# Patient Record
Sex: MALE | Race: WHITE | NOT HISPANIC OR LATINO | Employment: OTHER
[De-identification: names, ages, dates, MRNs, and addresses within clinical notes are randomized per-mention and may not be internally consistent; named-entity substitution may affect disease eponyms.]

---

## 2017-05-16 ENCOUNTER — RECORDS - HEALTHEAST (OUTPATIENT)
Dept: ADMINISTRATIVE | Facility: OTHER | Age: 52
End: 2017-05-16

## 2017-05-23 ENCOUNTER — AMBULATORY - HEALTHEAST (OUTPATIENT)
Dept: SCHEDULING | Facility: CLINIC | Age: 52
End: 2017-05-23

## 2017-05-23 DIAGNOSIS — E10.8 TYPE 1 DIABETES MELLITUS WITH COMPLICATION (H): ICD-10-CM

## 2017-07-17 ENCOUNTER — HOSPITAL ENCOUNTER (OUTPATIENT)
Dept: NUTRITION | Facility: HOSPITAL | Age: 52
Discharge: HOME | End: 2017-07-17

## 2017-07-17 DIAGNOSIS — E10.8 TYPE 1 DIABETES MELLITUS WITH COMPLICATION (H): ICD-10-CM

## 2017-11-02 ASSESSMENT — MIFFLIN-ST. JEOR: SCORE: 1686.29

## 2017-11-03 ENCOUNTER — SURGERY - HEALTHEAST (OUTPATIENT)
Dept: SURGERY | Facility: HOSPITAL | Age: 52
End: 2017-11-03

## 2017-11-03 ENCOUNTER — ANESTHESIA - HEALTHEAST (OUTPATIENT)
Dept: SURGERY | Facility: HOSPITAL | Age: 52
End: 2017-11-03

## 2017-11-06 ENCOUNTER — HOME CARE/HOSPICE - HEALTHEAST (OUTPATIENT)
Dept: HOME HEALTH SERVICES | Facility: HOME HEALTH | Age: 52
End: 2017-11-06

## 2018-12-05 ENCOUNTER — COMMUNICATION - HEALTHEAST (OUTPATIENT)
Dept: FAMILY MEDICINE | Facility: CLINIC | Age: 53
End: 2018-12-05

## 2018-12-12 ENCOUNTER — OFFICE VISIT - HEALTHEAST (OUTPATIENT)
Dept: FAMILY MEDICINE | Facility: CLINIC | Age: 53
End: 2018-12-12

## 2018-12-12 DIAGNOSIS — E10.9 TYPE 1 DIABETES MELLITUS (H): ICD-10-CM

## 2018-12-12 DIAGNOSIS — Z12.11 SCREEN FOR COLON CANCER: ICD-10-CM

## 2018-12-12 DIAGNOSIS — E78.5 HYPERLIPIDEMIA, UNSPECIFIED HYPERLIPIDEMIA TYPE: ICD-10-CM

## 2018-12-12 DIAGNOSIS — F41.1 GAD (GENERALIZED ANXIETY DISORDER): ICD-10-CM

## 2018-12-12 DIAGNOSIS — E03.9 HYPOTHYROIDISM, UNSPECIFIED TYPE: ICD-10-CM

## 2018-12-12 DIAGNOSIS — I10 ESSENTIAL HYPERTENSION, BENIGN: ICD-10-CM

## 2018-12-12 LAB
ALBUMIN SERPL-MCNC: 3.7 G/DL (ref 3.5–5)
ALP SERPL-CCNC: 141 U/L (ref 45–120)
ALT SERPL W P-5'-P-CCNC: 23 U/L (ref 0–45)
ANION GAP SERPL CALCULATED.3IONS-SCNC: 8 MMOL/L (ref 5–18)
AST SERPL W P-5'-P-CCNC: 18 U/L (ref 0–40)
BILIRUB SERPL-MCNC: 0.3 MG/DL (ref 0–1)
BUN SERPL-MCNC: 47 MG/DL (ref 8–22)
CALCIUM SERPL-MCNC: 9.3 MG/DL (ref 8.5–10.5)
CHLORIDE BLD-SCNC: 105 MMOL/L (ref 98–107)
CO2 SERPL-SCNC: 29 MMOL/L (ref 22–31)
CREAT SERPL-MCNC: 2.34 MG/DL (ref 0.7–1.3)
ERYTHROCYTE [DISTWIDTH] IN BLOOD BY AUTOMATED COUNT: 12.4 % (ref 11–14.5)
GFR SERPL CREATININE-BSD FRML MDRD: 29 ML/MIN/1.73M2
GLUCOSE BLD-MCNC: 230 MG/DL (ref 70–125)
HBA1C MFR BLD: 8.3 % (ref 3.5–6)
HCT VFR BLD AUTO: 40.8 % (ref 40–54)
HGB BLD-MCNC: 13.9 G/DL (ref 14–18)
LDLC SERPL CALC-MCNC: 64 MG/DL
MCH RBC QN AUTO: 30.1 PG (ref 27–34)
MCHC RBC AUTO-ENTMCNC: 34.1 G/DL (ref 32–36)
MCV RBC AUTO: 88 FL (ref 80–100)
PLATELET # BLD AUTO: 162 THOU/UL (ref 140–440)
PMV BLD AUTO: 9 FL (ref 7–10)
POTASSIUM BLD-SCNC: 4.7 MMOL/L (ref 3.5–5)
PROT SERPL-MCNC: 6.6 G/DL (ref 6–8)
RBC # BLD AUTO: 4.62 MILL/UL (ref 4.4–6.2)
SODIUM SERPL-SCNC: 142 MMOL/L (ref 136–145)
TSH SERPL DL<=0.005 MIU/L-ACNC: 3.42 UIU/ML (ref 0.3–5)
WBC: 5.8 THOU/UL (ref 4–11)

## 2018-12-12 ASSESSMENT — MIFFLIN-ST. JEOR: SCORE: 1661.9

## 2018-12-17 ENCOUNTER — COMMUNICATION - HEALTHEAST (OUTPATIENT)
Dept: FAMILY MEDICINE | Facility: CLINIC | Age: 53
End: 2018-12-17

## 2018-12-26 ENCOUNTER — COMMUNICATION - HEALTHEAST (OUTPATIENT)
Dept: FAMILY MEDICINE | Facility: CLINIC | Age: 53
End: 2018-12-26

## 2019-01-18 ENCOUNTER — COMMUNICATION - HEALTHEAST (OUTPATIENT)
Dept: FAMILY MEDICINE | Facility: CLINIC | Age: 54
End: 2019-01-18

## 2019-01-21 ENCOUNTER — COMMUNICATION - HEALTHEAST (OUTPATIENT)
Dept: FAMILY MEDICINE | Facility: CLINIC | Age: 54
End: 2019-01-21

## 2019-01-25 ENCOUNTER — RECORDS - HEALTHEAST (OUTPATIENT)
Dept: ADMINISTRATIVE | Facility: OTHER | Age: 54
End: 2019-01-25

## 2019-02-11 ENCOUNTER — COMMUNICATION - HEALTHEAST (OUTPATIENT)
Dept: FAMILY MEDICINE | Facility: CLINIC | Age: 54
End: 2019-02-11

## 2019-02-12 ENCOUNTER — COMMUNICATION - HEALTHEAST (OUTPATIENT)
Dept: FAMILY MEDICINE | Facility: CLINIC | Age: 54
End: 2019-02-12

## 2019-02-18 ENCOUNTER — COMMUNICATION - HEALTHEAST (OUTPATIENT)
Dept: FAMILY MEDICINE | Facility: CLINIC | Age: 54
End: 2019-02-18

## 2019-02-21 ENCOUNTER — OFFICE VISIT - HEALTHEAST (OUTPATIENT)
Dept: FAMILY MEDICINE | Facility: CLINIC | Age: 54
End: 2019-02-21

## 2019-02-21 DIAGNOSIS — E78.5 HYPERLIPIDEMIA, UNSPECIFIED HYPERLIPIDEMIA TYPE: ICD-10-CM

## 2019-02-21 DIAGNOSIS — E03.9 HYPOTHYROIDISM, UNSPECIFIED TYPE: ICD-10-CM

## 2019-02-21 DIAGNOSIS — G25.81 RESTLESS LEG SYNDROME: ICD-10-CM

## 2019-02-21 DIAGNOSIS — E10.319 TYPE 1 DIABETES MELLITUS WITH RETINOPATHY OF BOTH EYES, MACULAR EDEMA PRESENCE UNSPECIFIED, UNSPECIFIED RETINOPATHY SEVERITY (H): ICD-10-CM

## 2019-02-21 DIAGNOSIS — I10 ESSENTIAL HYPERTENSION, BENIGN: ICD-10-CM

## 2019-02-21 LAB
CREAT UR-MCNC: 70.7 MG/DL
MICROALBUMIN UR-MCNC: 2.41 MG/DL (ref 0–1.99)
MICROALBUMIN/CREAT UR: 34.1 MG/G

## 2019-03-02 ENCOUNTER — HOME CARE/HOSPICE - HEALTHEAST (OUTPATIENT)
Dept: HOME HEALTH SERVICES | Facility: HOME HEALTH | Age: 54
End: 2019-03-02

## 2019-03-04 ENCOUNTER — COMMUNICATION - HEALTHEAST (OUTPATIENT)
Dept: HOME HEALTH SERVICES | Facility: HOME HEALTH | Age: 54
End: 2019-03-04

## 2019-03-05 ENCOUNTER — HOME CARE/HOSPICE - HEALTHEAST (OUTPATIENT)
Dept: HOME HEALTH SERVICES | Facility: HOME HEALTH | Age: 54
End: 2019-03-05

## 2019-03-05 ENCOUNTER — COMMUNICATION - HEALTHEAST (OUTPATIENT)
Dept: CARE COORDINATION | Facility: CLINIC | Age: 54
End: 2019-03-05

## 2019-03-06 ENCOUNTER — OFFICE VISIT - HEALTHEAST (OUTPATIENT)
Dept: FAMILY MEDICINE | Facility: CLINIC | Age: 54
End: 2019-03-06

## 2019-03-06 DIAGNOSIS — I10 ESSENTIAL HYPERTENSION, BENIGN: ICD-10-CM

## 2019-03-06 DIAGNOSIS — E10.10 DIABETIC KETOACIDOSIS WITHOUT COMA ASSOCIATED WITH TYPE 1 DIABETES MELLITUS (H): ICD-10-CM

## 2019-03-06 DIAGNOSIS — Z09 HOSPITAL DISCHARGE FOLLOW-UP: ICD-10-CM

## 2019-03-06 DIAGNOSIS — F41.1 GAD (GENERALIZED ANXIETY DISORDER): ICD-10-CM

## 2019-03-06 DIAGNOSIS — A08.4 VIRAL GASTROENTERITIS: ICD-10-CM

## 2019-03-06 DIAGNOSIS — N18.30 CHRONIC KIDNEY DISEASE, STAGE III (MODERATE) (H): ICD-10-CM

## 2019-03-06 DIAGNOSIS — E78.5 HYPERLIPIDEMIA, UNSPECIFIED HYPERLIPIDEMIA TYPE: ICD-10-CM

## 2019-03-06 LAB
ANION GAP SERPL CALCULATED.3IONS-SCNC: 10 MMOL/L (ref 5–18)
BUN SERPL-MCNC: 30 MG/DL (ref 8–22)
CALCIUM SERPL-MCNC: 8.7 MG/DL (ref 8.5–10.5)
CHLORIDE BLD-SCNC: 106 MMOL/L (ref 98–107)
CO2 SERPL-SCNC: 22 MMOL/L (ref 22–31)
CREAT SERPL-MCNC: 1.96 MG/DL (ref 0.7–1.3)
GFR SERPL CREATININE-BSD FRML MDRD: 36 ML/MIN/1.73M2
GLUCOSE BLD-MCNC: 161 MG/DL (ref 70–125)
POTASSIUM BLD-SCNC: 5 MMOL/L (ref 3.5–5)
SODIUM SERPL-SCNC: 138 MMOL/L (ref 136–145)

## 2019-03-06 ASSESSMENT — MIFFLIN-ST. JEOR: SCORE: 1682.2

## 2019-03-07 ENCOUNTER — OFFICE VISIT - HEALTHEAST (OUTPATIENT)
Dept: PHARMACY | Facility: CLINIC | Age: 54
End: 2019-03-07

## 2019-03-07 ENCOUNTER — HOME CARE/HOSPICE - HEALTHEAST (OUTPATIENT)
Dept: HOME HEALTH SERVICES | Facility: HOME HEALTH | Age: 54
End: 2019-03-07

## 2019-03-07 ENCOUNTER — COMMUNICATION - HEALTHEAST (OUTPATIENT)
Dept: FAMILY MEDICINE | Facility: CLINIC | Age: 54
End: 2019-03-07

## 2019-03-07 DIAGNOSIS — I15.9 SECONDARY HYPERTENSION: ICD-10-CM

## 2019-03-07 DIAGNOSIS — F41.1 GAD (GENERALIZED ANXIETY DISORDER): ICD-10-CM

## 2019-03-07 DIAGNOSIS — G25.81 RESTLESS LEGS SYNDROME (RLS): ICD-10-CM

## 2019-03-07 DIAGNOSIS — G25.81 RESTLESS LEG SYNDROME: ICD-10-CM

## 2019-03-07 DIAGNOSIS — E10.10 DKA, TYPE 1, NOT AT GOAL (H): ICD-10-CM

## 2019-03-07 DIAGNOSIS — F51.01 PRIMARY INSOMNIA: ICD-10-CM

## 2019-03-08 ENCOUNTER — HOME CARE/HOSPICE - HEALTHEAST (OUTPATIENT)
Dept: HOME HEALTH SERVICES | Facility: HOME HEALTH | Age: 54
End: 2019-03-08

## 2019-03-09 ENCOUNTER — HOME CARE/HOSPICE - HEALTHEAST (OUTPATIENT)
Dept: HOME HEALTH SERVICES | Facility: HOME HEALTH | Age: 54
End: 2019-03-09

## 2019-03-12 ENCOUNTER — HOME CARE/HOSPICE - HEALTHEAST (OUTPATIENT)
Dept: HOME HEALTH SERVICES | Facility: HOME HEALTH | Age: 54
End: 2019-03-12

## 2019-03-13 ENCOUNTER — COMMUNICATION - HEALTHEAST (OUTPATIENT)
Dept: FAMILY MEDICINE | Facility: CLINIC | Age: 54
End: 2019-03-13

## 2019-03-15 ENCOUNTER — HOME CARE/HOSPICE - HEALTHEAST (OUTPATIENT)
Dept: HOME HEALTH SERVICES | Facility: HOME HEALTH | Age: 54
End: 2019-03-15

## 2019-03-28 ENCOUNTER — OFFICE VISIT - HEALTHEAST (OUTPATIENT)
Dept: FAMILY MEDICINE | Facility: CLINIC | Age: 54
End: 2019-03-28

## 2019-03-28 ENCOUNTER — OFFICE VISIT - HEALTHEAST (OUTPATIENT)
Dept: PHARMACY | Facility: CLINIC | Age: 54
End: 2019-03-28

## 2019-03-28 DIAGNOSIS — I10 ESSENTIAL HYPERTENSION, BENIGN: ICD-10-CM

## 2019-03-28 DIAGNOSIS — I15.9 SECONDARY HYPERTENSION: ICD-10-CM

## 2019-03-28 DIAGNOSIS — F41.1 GAD (GENERALIZED ANXIETY DISORDER): ICD-10-CM

## 2019-03-28 DIAGNOSIS — E78.5 HYPERLIPIDEMIA, UNSPECIFIED HYPERLIPIDEMIA TYPE: ICD-10-CM

## 2019-03-28 DIAGNOSIS — E10.10 DKA, TYPE 1, NOT AT GOAL (H): ICD-10-CM

## 2019-03-28 DIAGNOSIS — N18.30 CHRONIC KIDNEY DISEASE, STAGE III (MODERATE) (H): ICD-10-CM

## 2019-03-28 DIAGNOSIS — E10.9 TYPE 1 DIABETES MELLITUS (H): ICD-10-CM

## 2019-03-28 DIAGNOSIS — G62.9 NEUROPATHY: ICD-10-CM

## 2019-03-28 DIAGNOSIS — R13.10 DYSPHAGIA, UNSPECIFIED TYPE: ICD-10-CM

## 2019-03-28 DIAGNOSIS — K92.2 GASTROINTESTINAL HEMORRHAGE, UNSPECIFIED GASTROINTESTINAL HEMORRHAGE TYPE: ICD-10-CM

## 2019-03-28 DIAGNOSIS — G25.81 RESTLESS LEGS SYNDROME (RLS): ICD-10-CM

## 2019-03-28 LAB
ANION GAP SERPL CALCULATED.3IONS-SCNC: 8 MMOL/L (ref 5–18)
BUN SERPL-MCNC: 22 MG/DL (ref 8–22)
CALCIUM SERPL-MCNC: 9.1 MG/DL (ref 8.5–10.5)
CHLORIDE BLD-SCNC: 109 MMOL/L (ref 98–107)
CHOLEST SERPL-MCNC: 111 MG/DL
CO2 SERPL-SCNC: 28 MMOL/L (ref 22–31)
CREAT SERPL-MCNC: 1.97 MG/DL (ref 0.7–1.3)
FASTING STATUS PATIENT QL REPORTED: NO
GFR SERPL CREATININE-BSD FRML MDRD: 35 ML/MIN/1.73M2
GLUCOSE BLD-MCNC: 154 MG/DL (ref 70–125)
HBA1C MFR BLD: 7.4 % (ref 3.5–6)
HDLC SERPL-MCNC: 41 MG/DL
LDLC SERPL CALC-MCNC: 53 MG/DL
MAGNESIUM SERPL-MCNC: 1.9 MG/DL (ref 1.8–2.6)
POTASSIUM BLD-SCNC: 5.3 MMOL/L (ref 3.5–5)
SODIUM SERPL-SCNC: 145 MMOL/L (ref 136–145)

## 2019-03-29 ENCOUNTER — COMMUNICATION - HEALTHEAST (OUTPATIENT)
Dept: PHARMACY | Facility: CLINIC | Age: 54
End: 2019-03-29

## 2019-04-01 ENCOUNTER — OFFICE VISIT - HEALTHEAST (OUTPATIENT)
Dept: EDUCATION SERVICES | Facility: CLINIC | Age: 54
End: 2019-04-01

## 2019-04-11 ENCOUNTER — COMMUNICATION - HEALTHEAST (OUTPATIENT)
Dept: FAMILY MEDICINE | Facility: CLINIC | Age: 54
End: 2019-04-11

## 2019-04-11 DIAGNOSIS — N52.9 ERECTILE DYSFUNCTION, UNSPECIFIED ERECTILE DYSFUNCTION TYPE: ICD-10-CM

## 2019-04-12 ENCOUNTER — RECORDS - HEALTHEAST (OUTPATIENT)
Dept: ADMINISTRATIVE | Facility: OTHER | Age: 54
End: 2019-04-12

## 2019-04-16 ENCOUNTER — COMMUNICATION - HEALTHEAST (OUTPATIENT)
Dept: FAMILY MEDICINE | Facility: CLINIC | Age: 54
End: 2019-04-16

## 2019-04-16 DIAGNOSIS — N52.9 ERECTILE DYSFUNCTION, UNSPECIFIED ERECTILE DYSFUNCTION TYPE: ICD-10-CM

## 2019-04-16 DIAGNOSIS — K92.2 GASTROINTESTINAL HEMORRHAGE, UNSPECIFIED GASTROINTESTINAL HEMORRHAGE TYPE: ICD-10-CM

## 2019-04-22 ENCOUNTER — COMMUNICATION - HEALTHEAST (OUTPATIENT)
Dept: FAMILY MEDICINE | Facility: CLINIC | Age: 54
End: 2019-04-22

## 2019-06-14 ENCOUNTER — COMMUNICATION - HEALTHEAST (OUTPATIENT)
Dept: FAMILY MEDICINE | Facility: CLINIC | Age: 54
End: 2019-06-14

## 2019-06-14 DIAGNOSIS — N52.9 ERECTILE DYSFUNCTION, UNSPECIFIED ERECTILE DYSFUNCTION TYPE: ICD-10-CM

## 2019-06-21 ENCOUNTER — COMMUNICATION - HEALTHEAST (OUTPATIENT)
Dept: FAMILY MEDICINE | Facility: CLINIC | Age: 54
End: 2019-06-21

## 2019-06-21 DIAGNOSIS — E10.319 TYPE 1 DIABETES MELLITUS WITH RETINOPATHY OF BOTH EYES, MACULAR EDEMA PRESENCE UNSPECIFIED, UNSPECIFIED RETINOPATHY SEVERITY (H): ICD-10-CM

## 2019-06-24 ENCOUNTER — COMMUNICATION - HEALTHEAST (OUTPATIENT)
Dept: FAMILY MEDICINE | Facility: CLINIC | Age: 54
End: 2019-06-24

## 2019-06-24 DIAGNOSIS — G25.81 RESTLESS LEG SYNDROME: ICD-10-CM

## 2019-06-24 DIAGNOSIS — R11.0 NAUSEA: ICD-10-CM

## 2019-06-25 ENCOUNTER — COMMUNICATION - HEALTHEAST (OUTPATIENT)
Dept: FAMILY MEDICINE | Facility: CLINIC | Age: 54
End: 2019-06-25

## 2019-06-25 DIAGNOSIS — E10.319 TYPE 1 DIABETES MELLITUS WITH RETINOPATHY OF BOTH EYES, MACULAR EDEMA PRESENCE UNSPECIFIED, UNSPECIFIED RETINOPATHY SEVERITY (H): ICD-10-CM

## 2019-07-17 ENCOUNTER — OFFICE VISIT - HEALTHEAST (OUTPATIENT)
Dept: FAMILY MEDICINE | Facility: CLINIC | Age: 54
End: 2019-07-17

## 2019-07-17 DIAGNOSIS — E78.5 HYPERLIPIDEMIA, UNSPECIFIED HYPERLIPIDEMIA TYPE: ICD-10-CM

## 2019-07-17 DIAGNOSIS — N18.30 CHRONIC KIDNEY DISEASE, STAGE III (MODERATE) (H): ICD-10-CM

## 2019-07-17 DIAGNOSIS — E10.9 TYPE 1 DIABETES MELLITUS (H): ICD-10-CM

## 2019-07-17 DIAGNOSIS — I10 ESSENTIAL HYPERTENSION, BENIGN: ICD-10-CM

## 2019-07-17 LAB
ANION GAP SERPL CALCULATED.3IONS-SCNC: 8 MMOL/L (ref 5–18)
BUN SERPL-MCNC: 32 MG/DL (ref 8–22)
CALCIUM SERPL-MCNC: 9.4 MG/DL (ref 8.5–10.5)
CHLORIDE BLD-SCNC: 108 MMOL/L (ref 98–107)
CO2 SERPL-SCNC: 26 MMOL/L (ref 22–31)
CREAT SERPL-MCNC: 2.26 MG/DL (ref 0.7–1.3)
GFR SERPL CREATININE-BSD FRML MDRD: 30 ML/MIN/1.73M2
GLUCOSE BLD-MCNC: 152 MG/DL (ref 70–125)
HBA1C MFR BLD: 7.5 % (ref 3.5–6)
POTASSIUM BLD-SCNC: 5.1 MMOL/L (ref 3.5–5)
SODIUM SERPL-SCNC: 142 MMOL/L (ref 136–145)

## 2019-07-24 ENCOUNTER — COMMUNICATION - HEALTHEAST (OUTPATIENT)
Dept: FAMILY MEDICINE | Facility: CLINIC | Age: 54
End: 2019-07-24

## 2019-07-24 DIAGNOSIS — G25.81 RESTLESS LEG SYNDROME: ICD-10-CM

## 2019-07-29 ENCOUNTER — COMMUNICATION - HEALTHEAST (OUTPATIENT)
Dept: FAMILY MEDICINE | Facility: CLINIC | Age: 54
End: 2019-07-29

## 2019-07-30 ENCOUNTER — COMMUNICATION - HEALTHEAST (OUTPATIENT)
Dept: SCHEDULING | Facility: CLINIC | Age: 54
End: 2019-07-30

## 2019-08-05 ENCOUNTER — COMMUNICATION - HEALTHEAST (OUTPATIENT)
Dept: FAMILY MEDICINE | Facility: CLINIC | Age: 54
End: 2019-08-05

## 2019-08-05 DIAGNOSIS — E10.10 DKA, TYPE 1, NOT AT GOAL (H): ICD-10-CM

## 2019-08-06 ENCOUNTER — COMMUNICATION - HEALTHEAST (OUTPATIENT)
Dept: FAMILY MEDICINE | Facility: CLINIC | Age: 54
End: 2019-08-06

## 2019-08-06 DIAGNOSIS — E10.319 TYPE 1 DIABETES MELLITUS WITH RETINOPATHY OF BOTH EYES, MACULAR EDEMA PRESENCE UNSPECIFIED, UNSPECIFIED RETINOPATHY SEVERITY (H): ICD-10-CM

## 2019-08-08 ENCOUNTER — COMMUNICATION - HEALTHEAST (OUTPATIENT)
Dept: FAMILY MEDICINE | Facility: CLINIC | Age: 54
End: 2019-08-08

## 2019-08-08 DIAGNOSIS — E10.319 TYPE 1 DIABETES MELLITUS WITH RETINOPATHY OF BOTH EYES, MACULAR EDEMA PRESENCE UNSPECIFIED, UNSPECIFIED RETINOPATHY SEVERITY (H): ICD-10-CM

## 2019-08-16 ENCOUNTER — COMMUNICATION - HEALTHEAST (OUTPATIENT)
Dept: FAMILY MEDICINE | Facility: CLINIC | Age: 54
End: 2019-08-16

## 2019-08-16 DIAGNOSIS — E10.319 TYPE 1 DIABETES MELLITUS WITH RETINOPATHY OF BOTH EYES, MACULAR EDEMA PRESENCE UNSPECIFIED, UNSPECIFIED RETINOPATHY SEVERITY (H): ICD-10-CM

## 2019-08-20 ENCOUNTER — AMBULATORY - HEALTHEAST (OUTPATIENT)
Dept: EDUCATION SERVICES | Facility: CLINIC | Age: 54
End: 2019-08-20

## 2019-08-23 ENCOUNTER — COMMUNICATION - HEALTHEAST (OUTPATIENT)
Dept: FAMILY MEDICINE | Facility: CLINIC | Age: 54
End: 2019-08-23

## 2019-09-03 ENCOUNTER — AMBULATORY - HEALTHEAST (OUTPATIENT)
Dept: ENDOCRINOLOGY | Facility: CLINIC | Age: 54
End: 2019-09-03

## 2019-09-03 ENCOUNTER — COMMUNICATION - HEALTHEAST (OUTPATIENT)
Dept: FAMILY MEDICINE | Facility: CLINIC | Age: 54
End: 2019-09-03

## 2019-09-03 ENCOUNTER — OFFICE VISIT - HEALTHEAST (OUTPATIENT)
Dept: ENDOCRINOLOGY | Facility: CLINIC | Age: 54
End: 2019-09-03

## 2019-09-03 DIAGNOSIS — Z79.4 TYPE 2 DIABETES MELLITUS WITH FOOT ULCER, WITH LONG-TERM CURRENT USE OF INSULIN (H): ICD-10-CM

## 2019-09-03 DIAGNOSIS — E10.319 TYPE 1 DIABETES MELLITUS WITH RETINOPATHY OF BOTH EYES, MACULAR EDEMA PRESENCE UNSPECIFIED, UNSPECIFIED RETINOPATHY SEVERITY (H): ICD-10-CM

## 2019-09-03 DIAGNOSIS — E11.621 TYPE 2 DIABETES MELLITUS WITH FOOT ULCER, WITH LONG-TERM CURRENT USE OF INSULIN (H): ICD-10-CM

## 2019-09-03 DIAGNOSIS — E10.8 TYPE 1 DIABETES MELLITUS WITH COMPLICATION (H): ICD-10-CM

## 2019-09-03 DIAGNOSIS — E10.3599 TYPE 1 DIABETES MELLITUS WITH PROLIFERATIVE RETINOPATHY, MACULAR EDEMA PRESENCE UNSPECIFIED, UNSPECIFIED LATERALITY, UNSPECIFIED PROLIFERATIVE RETINOPATHY TYPE (H): ICD-10-CM

## 2019-09-03 DIAGNOSIS — L97.509 TYPE 2 DIABETES MELLITUS WITH FOOT ULCER, WITH LONG-TERM CURRENT USE OF INSULIN (H): ICD-10-CM

## 2019-09-03 ASSESSMENT — MIFFLIN-ST. JEOR: SCORE: 1723.03

## 2019-09-04 ENCOUNTER — COMMUNICATION - HEALTHEAST (OUTPATIENT)
Dept: ADMINISTRATIVE | Facility: CLINIC | Age: 54
End: 2019-09-04

## 2019-09-04 ENCOUNTER — COMMUNICATION - HEALTHEAST (OUTPATIENT)
Dept: FAMILY MEDICINE | Facility: CLINIC | Age: 54
End: 2019-09-04

## 2019-09-04 DIAGNOSIS — E10.8 TYPE 1 DIABETES MELLITUS WITH COMPLICATION (H): ICD-10-CM

## 2019-09-05 ENCOUNTER — COMMUNICATION - HEALTHEAST (OUTPATIENT)
Dept: FAMILY MEDICINE | Facility: CLINIC | Age: 54
End: 2019-09-05

## 2019-09-06 ENCOUNTER — COMMUNICATION - HEALTHEAST (OUTPATIENT)
Dept: FAMILY MEDICINE | Facility: CLINIC | Age: 54
End: 2019-09-06

## 2019-09-06 DIAGNOSIS — E10.9 TYPE 1 DIABETES MELLITUS (H): ICD-10-CM

## 2019-09-16 ENCOUNTER — COMMUNICATION - HEALTHEAST (OUTPATIENT)
Dept: FAMILY MEDICINE | Facility: CLINIC | Age: 54
End: 2019-09-16

## 2019-09-16 DIAGNOSIS — I15.9 SECONDARY HYPERTENSION: ICD-10-CM

## 2019-09-17 ENCOUNTER — AMBULATORY - HEALTHEAST (OUTPATIENT)
Dept: EDUCATION SERVICES | Facility: CLINIC | Age: 54
End: 2019-09-17

## 2019-09-17 DIAGNOSIS — E10.319 TYPE 1 DIABETES MELLITUS WITH RETINOPATHY OF BOTH EYES, MACULAR EDEMA PRESENCE UNSPECIFIED, UNSPECIFIED RETINOPATHY SEVERITY (H): ICD-10-CM

## 2019-09-18 ENCOUNTER — COMMUNICATION - HEALTHEAST (OUTPATIENT)
Dept: ADMINISTRATIVE | Facility: CLINIC | Age: 54
End: 2019-09-18

## 2019-09-19 ENCOUNTER — COMMUNICATION - HEALTHEAST (OUTPATIENT)
Dept: FAMILY MEDICINE | Facility: CLINIC | Age: 54
End: 2019-09-19

## 2019-09-19 DIAGNOSIS — I15.9 SECONDARY HYPERTENSION: ICD-10-CM

## 2019-09-26 ENCOUNTER — COMMUNICATION - HEALTHEAST (OUTPATIENT)
Dept: FAMILY MEDICINE | Facility: CLINIC | Age: 54
End: 2019-09-26

## 2019-09-26 DIAGNOSIS — N52.9 ERECTILE DYSFUNCTION, UNSPECIFIED ERECTILE DYSFUNCTION TYPE: ICD-10-CM

## 2019-09-26 RX ORDER — SILDENAFIL 100 MG/1
100 TABLET, FILM COATED ORAL PRN
Qty: 30 TABLET | Refills: 2 | Status: SHIPPED | OUTPATIENT
Start: 2019-09-26

## 2019-10-14 ENCOUNTER — COMMUNICATION - HEALTHEAST (OUTPATIENT)
Dept: FAMILY MEDICINE | Facility: CLINIC | Age: 54
End: 2019-10-14

## 2019-10-14 ENCOUNTER — COMMUNICATION - HEALTHEAST (OUTPATIENT)
Dept: SCHEDULING | Facility: CLINIC | Age: 54
End: 2019-10-14

## 2019-10-14 DIAGNOSIS — E10.319 TYPE 1 DIABETES MELLITUS WITH RETINOPATHY OF BOTH EYES, MACULAR EDEMA PRESENCE UNSPECIFIED, UNSPECIFIED RETINOPATHY SEVERITY (H): ICD-10-CM

## 2019-10-18 ENCOUNTER — COMMUNICATION - HEALTHEAST (OUTPATIENT)
Dept: FAMILY MEDICINE | Facility: CLINIC | Age: 54
End: 2019-10-18

## 2019-10-18 DIAGNOSIS — E78.5 HYPERLIPIDEMIA, UNSPECIFIED HYPERLIPIDEMIA TYPE: ICD-10-CM

## 2019-11-05 ENCOUNTER — COMMUNICATION - HEALTHEAST (OUTPATIENT)
Dept: ENDOCRINOLOGY | Facility: CLINIC | Age: 54
End: 2019-11-05

## 2019-11-05 ENCOUNTER — COMMUNICATION - HEALTHEAST (OUTPATIENT)
Dept: FAMILY MEDICINE | Facility: CLINIC | Age: 54
End: 2019-11-05

## 2019-11-05 DIAGNOSIS — E10.319 TYPE 1 DIABETES MELLITUS WITH RETINOPATHY OF BOTH EYES, MACULAR EDEMA PRESENCE UNSPECIFIED, UNSPECIFIED RETINOPATHY SEVERITY (H): ICD-10-CM

## 2019-11-06 ENCOUNTER — COMMUNICATION - HEALTHEAST (OUTPATIENT)
Dept: FAMILY MEDICINE | Facility: CLINIC | Age: 54
End: 2019-11-06

## 2019-11-12 ENCOUNTER — COMMUNICATION - HEALTHEAST (OUTPATIENT)
Dept: FAMILY MEDICINE | Facility: CLINIC | Age: 54
End: 2019-11-12

## 2019-11-12 ENCOUNTER — AMBULATORY - HEALTHEAST (OUTPATIENT)
Dept: EDUCATION SERVICES | Facility: CLINIC | Age: 54
End: 2019-11-12

## 2019-11-12 ENCOUNTER — AMBULATORY - HEALTHEAST (OUTPATIENT)
Dept: PHARMACY | Facility: CLINIC | Age: 54
End: 2019-11-12

## 2019-11-12 DIAGNOSIS — E10.319 TYPE 1 DIABETES MELLITUS WITH RETINOPATHY OF BOTH EYES, MACULAR EDEMA PRESENCE UNSPECIFIED, UNSPECIFIED RETINOPATHY SEVERITY (H): ICD-10-CM

## 2019-11-12 DIAGNOSIS — K92.2 GASTROINTESTINAL HEMORRHAGE, UNSPECIFIED GASTROINTESTINAL HEMORRHAGE TYPE: ICD-10-CM

## 2019-11-14 ENCOUNTER — COMMUNICATION - HEALTHEAST (OUTPATIENT)
Dept: FAMILY MEDICINE | Facility: CLINIC | Age: 54
End: 2019-11-14

## 2019-11-19 ENCOUNTER — OFFICE VISIT - HEALTHEAST (OUTPATIENT)
Dept: FAMILY MEDICINE | Facility: CLINIC | Age: 54
End: 2019-11-19

## 2019-11-19 DIAGNOSIS — F41.1 GAD (GENERALIZED ANXIETY DISORDER): ICD-10-CM

## 2019-11-19 DIAGNOSIS — E03.9 HYPOTHYROIDISM, UNSPECIFIED TYPE: ICD-10-CM

## 2019-11-19 DIAGNOSIS — Z00.01 ENCOUNTER FOR GENERAL ADULT MEDICAL EXAMINATION WITH ABNORMAL FINDINGS: ICD-10-CM

## 2019-11-19 DIAGNOSIS — G25.81 RESTLESS LEG SYNDROME: ICD-10-CM

## 2019-11-19 DIAGNOSIS — E10.9 TYPE 1 DIABETES MELLITUS (H): ICD-10-CM

## 2019-11-19 DIAGNOSIS — Z12.5 ENCOUNTER FOR SCREENING FOR MALIGNANT NEOPLASM OF PROSTATE: ICD-10-CM

## 2019-11-19 DIAGNOSIS — E16.2 HYPOGLYCEMIA: ICD-10-CM

## 2019-11-19 DIAGNOSIS — E78.5 HYPERLIPIDEMIA, UNSPECIFIED HYPERLIPIDEMIA TYPE: ICD-10-CM

## 2019-11-19 DIAGNOSIS — I10 ESSENTIAL HYPERTENSION, BENIGN: ICD-10-CM

## 2019-11-19 DIAGNOSIS — E55.9 VITAMIN D INSUFFICIENCY: ICD-10-CM

## 2019-11-19 DIAGNOSIS — N18.30 CHRONIC KIDNEY DISEASE, STAGE III (MODERATE) (H): ICD-10-CM

## 2019-11-19 LAB
25(OH)D3 SERPL-MCNC: 16.9 NG/ML (ref 30–80)
ALBUMIN SERPL-MCNC: 3.5 G/DL (ref 3.5–5)
ALP SERPL-CCNC: 116 U/L (ref 45–120)
ALT SERPL W P-5'-P-CCNC: 13 U/L (ref 0–45)
ANION GAP SERPL CALCULATED.3IONS-SCNC: 6 MMOL/L (ref 5–18)
AST SERPL W P-5'-P-CCNC: 13 U/L (ref 0–40)
BILIRUB SERPL-MCNC: 0.3 MG/DL (ref 0–1)
BUN SERPL-MCNC: 32 MG/DL (ref 8–22)
CALCIUM SERPL-MCNC: 9 MG/DL (ref 8.5–10.5)
CHLORIDE BLD-SCNC: 109 MMOL/L (ref 98–107)
CHOLEST SERPL-MCNC: 124 MG/DL
CO2 SERPL-SCNC: 27 MMOL/L (ref 22–31)
CREAT SERPL-MCNC: 2.12 MG/DL (ref 0.7–1.3)
ERYTHROCYTE [DISTWIDTH] IN BLOOD BY AUTOMATED COUNT: 12.3 % (ref 11–14.5)
FASTING STATUS PATIENT QL REPORTED: NO
GFR SERPL CREATININE-BSD FRML MDRD: 33 ML/MIN/1.73M2
GLUCOSE BLD-MCNC: 203 MG/DL (ref 70–125)
HBA1C MFR BLD: 6.7 % (ref 3.5–6)
HCT VFR BLD AUTO: 38.5 % (ref 40–54)
HCV AB SERPL QL IA: NEGATIVE
HDLC SERPL-MCNC: 40 MG/DL
HGB BLD-MCNC: 13.3 G/DL (ref 14–18)
LDLC SERPL CALC-MCNC: 54 MG/DL
MCH RBC QN AUTO: 31 PG (ref 27–34)
MCHC RBC AUTO-ENTMCNC: 34.6 G/DL (ref 32–36)
MCV RBC AUTO: 90 FL (ref 80–100)
PLATELET # BLD AUTO: 139 THOU/UL (ref 140–440)
PMV BLD AUTO: 9.6 FL (ref 7–10)
POTASSIUM BLD-SCNC: 5.5 MMOL/L (ref 3.5–5)
PROT SERPL-MCNC: 6.4 G/DL (ref 6–8)
PSA SERPL-MCNC: 0.6 NG/ML (ref 0–3.5)
RBC # BLD AUTO: 4.29 MILL/UL (ref 4.4–6.2)
SODIUM SERPL-SCNC: 142 MMOL/L (ref 136–145)
TRIGL SERPL-MCNC: 148 MG/DL
TSH SERPL DL<=0.005 MIU/L-ACNC: 5.72 UIU/ML (ref 0.3–5)
WBC: 6.4 THOU/UL (ref 4–11)

## 2019-11-19 ASSESSMENT — ANXIETY QUESTIONNAIRES
4. TROUBLE RELAXING: MORE THAN HALF THE DAYS
2. NOT BEING ABLE TO STOP OR CONTROL WORRYING: SEVERAL DAYS
5. BEING SO RESTLESS THAT IT IS HARD TO SIT STILL: SEVERAL DAYS
3. WORRYING TOO MUCH ABOUT DIFFERENT THINGS: MORE THAN HALF THE DAYS
7. FEELING AFRAID AS IF SOMETHING AWFUL MIGHT HAPPEN: MORE THAN HALF THE DAYS
1. FEELING NERVOUS, ANXIOUS, OR ON EDGE: MORE THAN HALF THE DAYS
6. BECOMING EASILY ANNOYED OR IRRITABLE: SEVERAL DAYS
GAD7 TOTAL SCORE: 11
IF YOU CHECKED OFF ANY PROBLEMS ON THIS QUESTIONNAIRE, HOW DIFFICULT HAVE THESE PROBLEMS MADE IT FOR YOU TO DO YOUR WORK, TAKE CARE OF THINGS AT HOME, OR GET ALONG WITH OTHER PEOPLE: SOMEWHAT DIFFICULT

## 2019-11-19 ASSESSMENT — MIFFLIN-ST. JEOR: SCORE: 1761.69

## 2019-11-19 ASSESSMENT — PATIENT HEALTH QUESTIONNAIRE - PHQ9: SUM OF ALL RESPONSES TO PHQ QUESTIONS 1-9: 13

## 2019-12-26 ENCOUNTER — OFFICE VISIT - HEALTHEAST (OUTPATIENT)
Dept: FAMILY MEDICINE | Facility: CLINIC | Age: 54
End: 2019-12-26

## 2019-12-26 DIAGNOSIS — E78.5 HYPERLIPIDEMIA, UNSPECIFIED HYPERLIPIDEMIA TYPE: ICD-10-CM

## 2019-12-26 DIAGNOSIS — N18.30 CHRONIC KIDNEY DISEASE, STAGE III (MODERATE) (H): ICD-10-CM

## 2019-12-26 DIAGNOSIS — D64.9 NORMOCHROMIC NORMOCYTIC ANEMIA: ICD-10-CM

## 2019-12-26 DIAGNOSIS — E87.5 HYPERKALEMIA: ICD-10-CM

## 2019-12-26 DIAGNOSIS — I10 ESSENTIAL HYPERTENSION, BENIGN: ICD-10-CM

## 2019-12-26 DIAGNOSIS — E03.9 HYPOTHYROIDISM, UNSPECIFIED TYPE: ICD-10-CM

## 2019-12-26 DIAGNOSIS — E10.319 TYPE 1 DIABETES MELLITUS WITH RETINOPATHY OF BOTH EYES, MACULAR EDEMA PRESENCE UNSPECIFIED, UNSPECIFIED RETINOPATHY SEVERITY (H): ICD-10-CM

## 2019-12-26 LAB
ANION GAP SERPL CALCULATED.3IONS-SCNC: 8 MMOL/L (ref 5–18)
BUN SERPL-MCNC: 35 MG/DL (ref 8–22)
CALCIUM SERPL-MCNC: 8.8 MG/DL (ref 8.5–10.5)
CHLORIDE BLD-SCNC: 109 MMOL/L (ref 98–107)
CO2 SERPL-SCNC: 26 MMOL/L (ref 22–31)
CREAT SERPL-MCNC: 2.13 MG/DL (ref 0.7–1.3)
ERYTHROCYTE [DISTWIDTH] IN BLOOD BY AUTOMATED COUNT: 13.1 % (ref 11–14.5)
GFR SERPL CREATININE-BSD FRML MDRD: 32 ML/MIN/1.73M2
GLUCOSE BLD-MCNC: 165 MG/DL (ref 70–125)
HCT VFR BLD AUTO: 39.7 % (ref 40–54)
HGB BLD-MCNC: 13.8 G/DL (ref 14–18)
MCH RBC QN AUTO: 30.8 PG (ref 27–34)
MCHC RBC AUTO-ENTMCNC: 34.9 G/DL (ref 32–36)
MCV RBC AUTO: 88 FL (ref 80–100)
PLATELET # BLD AUTO: 177 THOU/UL (ref 140–440)
PMV BLD AUTO: 7.9 FL (ref 7–10)
POTASSIUM BLD-SCNC: 5.2 MMOL/L (ref 3.5–5)
RBC # BLD AUTO: 4.49 MILL/UL (ref 4.4–6.2)
SODIUM SERPL-SCNC: 143 MMOL/L (ref 136–145)
TSH SERPL DL<=0.005 MIU/L-ACNC: 2.79 UIU/ML (ref 0.3–5)
WBC: 7.8 THOU/UL (ref 4–11)

## 2020-01-29 ENCOUNTER — COMMUNICATION - HEALTHEAST (OUTPATIENT)
Dept: FAMILY MEDICINE | Facility: CLINIC | Age: 55
End: 2020-01-29

## 2020-01-29 DIAGNOSIS — E10.9 TYPE 1 DIABETES MELLITUS (H): ICD-10-CM

## 2020-02-10 ENCOUNTER — COMMUNICATION - HEALTHEAST (OUTPATIENT)
Dept: FAMILY MEDICINE | Facility: CLINIC | Age: 55
End: 2020-02-10

## 2020-02-10 DIAGNOSIS — I10 ESSENTIAL HYPERTENSION, BENIGN: ICD-10-CM

## 2020-02-19 ENCOUNTER — COMMUNICATION - HEALTHEAST (OUTPATIENT)
Dept: SCHEDULING | Facility: CLINIC | Age: 55
End: 2020-02-19

## 2020-02-28 ENCOUNTER — OFFICE VISIT - HEALTHEAST (OUTPATIENT)
Dept: FAMILY MEDICINE | Facility: CLINIC | Age: 55
End: 2020-02-28

## 2020-02-28 ENCOUNTER — COMMUNICATION - HEALTHEAST (OUTPATIENT)
Dept: SCHEDULING | Facility: CLINIC | Age: 55
End: 2020-02-28

## 2020-02-28 DIAGNOSIS — R19.7 DIARRHEA, UNSPECIFIED TYPE: ICD-10-CM

## 2020-02-28 DIAGNOSIS — J10.1 INFLUENZA A: ICD-10-CM

## 2020-02-28 DIAGNOSIS — E10.10 DIABETIC KETOACIDOSIS WITHOUT COMA ASSOCIATED WITH TYPE 1 DIABETES MELLITUS (H): ICD-10-CM

## 2020-02-28 DIAGNOSIS — J18.9 PNEUMONIA OF LEFT LUNG DUE TO INFECTIOUS ORGANISM, UNSPECIFIED PART OF LUNG: ICD-10-CM

## 2020-03-04 ENCOUNTER — AMBULATORY - HEALTHEAST (OUTPATIENT)
Dept: LAB | Facility: CLINIC | Age: 55
End: 2020-03-04

## 2020-03-04 DIAGNOSIS — E10.319 TYPE 1 DIABETES MELLITUS WITH RETINOPATHY OF BOTH EYES, MACULAR EDEMA PRESENCE UNSPECIFIED, UNSPECIFIED RETINOPATHY SEVERITY (H): ICD-10-CM

## 2020-03-04 LAB
ANION GAP SERPL CALCULATED.3IONS-SCNC: 11 MMOL/L (ref 5–18)
BUN SERPL-MCNC: 17 MG/DL (ref 8–22)
CALCIUM SERPL-MCNC: 8.9 MG/DL (ref 8.5–10.5)
CHLORIDE BLD-SCNC: 107 MMOL/L (ref 98–107)
CO2 SERPL-SCNC: 24 MMOL/L (ref 22–31)
CREAT SERPL-MCNC: 1.9 MG/DL (ref 0.7–1.3)
CREAT UR-MCNC: 113.9 MG/DL
GFR SERPL CREATININE-BSD FRML MDRD: 37 ML/MIN/1.73M2
GLUCOSE BLD-MCNC: 162 MG/DL (ref 70–125)
HBA1C MFR BLD: 6.8 % (ref 3.5–6)
MICROALBUMIN UR-MCNC: 44.43 MG/DL (ref 0–1.99)
MICROALBUMIN/CREAT UR: 390.1 MG/G
POTASSIUM BLD-SCNC: 4.9 MMOL/L (ref 3.5–5)
SODIUM SERPL-SCNC: 142 MMOL/L (ref 136–145)

## 2020-03-11 ENCOUNTER — OFFICE VISIT - HEALTHEAST (OUTPATIENT)
Dept: ENDOCRINOLOGY | Facility: CLINIC | Age: 55
End: 2020-03-11

## 2020-03-11 DIAGNOSIS — E10.9 TYPE 1 DIABETES MELLITUS (H): ICD-10-CM

## 2020-03-11 DIAGNOSIS — E10.22 TYPE 1 DIABETES MELLITUS WITH STAGE 3 CHRONIC KIDNEY DISEASE (H): ICD-10-CM

## 2020-03-11 DIAGNOSIS — N18.30 TYPE 1 DIABETES MELLITUS WITH STAGE 3 CHRONIC KIDNEY DISEASE (H): ICD-10-CM

## 2020-03-17 ENCOUNTER — AMBULATORY - HEALTHEAST (OUTPATIENT)
Dept: EDUCATION SERVICES | Facility: CLINIC | Age: 55
End: 2020-03-17

## 2020-03-17 DIAGNOSIS — E10.319 TYPE 1 DIABETES MELLITUS WITH RETINOPATHY OF BOTH EYES, MACULAR EDEMA PRESENCE UNSPECIFIED, UNSPECIFIED RETINOPATHY SEVERITY (H): ICD-10-CM

## 2020-03-18 ENCOUNTER — COMMUNICATION - HEALTHEAST (OUTPATIENT)
Dept: ADMINISTRATIVE | Facility: CLINIC | Age: 55
End: 2020-03-18

## 2020-03-20 ENCOUNTER — COMMUNICATION - HEALTHEAST (OUTPATIENT)
Dept: FAMILY MEDICINE | Facility: CLINIC | Age: 55
End: 2020-03-20

## 2020-03-20 DIAGNOSIS — R11.0 NAUSEA: ICD-10-CM

## 2020-03-20 RX ORDER — ONDANSETRON 4 MG/1
4 TABLET, FILM COATED ORAL EVERY 8 HOURS PRN
Qty: 20 TABLET | Refills: 3 | Status: SHIPPED | OUTPATIENT
Start: 2020-03-20

## 2020-03-23 ENCOUNTER — COMMUNICATION - HEALTHEAST (OUTPATIENT)
Dept: FAMILY MEDICINE | Facility: CLINIC | Age: 55
End: 2020-03-23

## 2020-03-24 ENCOUNTER — AMBULATORY - HEALTHEAST (OUTPATIENT)
Dept: EDUCATION SERVICES | Facility: CLINIC | Age: 55
End: 2020-03-24

## 2020-03-24 DIAGNOSIS — E10.319 TYPE 1 DIABETES MELLITUS WITH RETINOPATHY OF BOTH EYES, MACULAR EDEMA PRESENCE UNSPECIFIED, UNSPECIFIED RETINOPATHY SEVERITY (H): ICD-10-CM

## 2020-03-26 ENCOUNTER — OFFICE VISIT - HEALTHEAST (OUTPATIENT)
Dept: FAMILY MEDICINE | Facility: CLINIC | Age: 55
End: 2020-03-26

## 2020-03-26 DIAGNOSIS — N18.30 CHRONIC KIDNEY DISEASE, STAGE III (MODERATE) (H): ICD-10-CM

## 2020-03-26 DIAGNOSIS — E10.319 TYPE 1 DIABETES MELLITUS WITH RETINOPATHY OF BOTH EYES, MACULAR EDEMA PRESENCE UNSPECIFIED, UNSPECIFIED RETINOPATHY SEVERITY (H): ICD-10-CM

## 2020-03-26 DIAGNOSIS — I10 ESSENTIAL HYPERTENSION, BENIGN: ICD-10-CM

## 2020-03-26 DIAGNOSIS — E78.5 HYPERLIPIDEMIA, UNSPECIFIED HYPERLIPIDEMIA TYPE: ICD-10-CM

## 2020-03-26 DIAGNOSIS — E03.9 HYPOTHYROIDISM, UNSPECIFIED TYPE: ICD-10-CM

## 2020-04-28 ENCOUNTER — COMMUNICATION - HEALTHEAST (OUTPATIENT)
Dept: FAMILY MEDICINE | Facility: CLINIC | Age: 55
End: 2020-04-28

## 2020-04-28 DIAGNOSIS — K92.2 GASTROINTESTINAL HEMORRHAGE, UNSPECIFIED GASTROINTESTINAL HEMORRHAGE TYPE: ICD-10-CM

## 2020-04-29 RX ORDER — PANTOPRAZOLE SODIUM 40 MG/1
TABLET, DELAYED RELEASE ORAL
Qty: 180 TABLET | Refills: 3 | Status: SHIPPED | OUTPATIENT
Start: 2020-04-29 | End: 2022-05-03

## 2020-05-05 ENCOUNTER — RECORDS - HEALTHEAST (OUTPATIENT)
Dept: ADMINISTRATIVE | Facility: OTHER | Age: 55
End: 2020-05-05

## 2020-05-05 LAB — RETINOPATHY: POSITIVE

## 2020-05-12 ENCOUNTER — RECORDS - HEALTHEAST (OUTPATIENT)
Dept: HEALTH INFORMATION MANAGEMENT | Facility: CLINIC | Age: 55
End: 2020-05-12

## 2020-05-18 ENCOUNTER — COMMUNICATION - HEALTHEAST (OUTPATIENT)
Dept: ENDOCRINOLOGY | Facility: CLINIC | Age: 55
End: 2020-05-18

## 2020-05-18 DIAGNOSIS — E10.9 TYPE 1 DIABETES MELLITUS (H): ICD-10-CM

## 2020-06-01 ENCOUNTER — COMMUNICATION - HEALTHEAST (OUTPATIENT)
Dept: SCHEDULING | Facility: CLINIC | Age: 55
End: 2020-06-01

## 2020-06-01 ENCOUNTER — COMMUNICATION - HEALTHEAST (OUTPATIENT)
Dept: FAMILY MEDICINE | Facility: CLINIC | Age: 55
End: 2020-06-01

## 2020-06-01 DIAGNOSIS — E10.319 TYPE 1 DIABETES MELLITUS WITH RETINOPATHY OF BOTH EYES, MACULAR EDEMA PRESENCE UNSPECIFIED, UNSPECIFIED RETINOPATHY SEVERITY (H): ICD-10-CM

## 2020-06-02 ENCOUNTER — OFFICE VISIT - HEALTHEAST (OUTPATIENT)
Dept: FAMILY MEDICINE | Facility: CLINIC | Age: 55
End: 2020-06-02

## 2020-06-02 DIAGNOSIS — M79.641 BILATERAL HAND PAIN: ICD-10-CM

## 2020-06-02 DIAGNOSIS — E78.5 HYPERLIPIDEMIA, UNSPECIFIED HYPERLIPIDEMIA TYPE: ICD-10-CM

## 2020-06-02 DIAGNOSIS — I10 ESSENTIAL HYPERTENSION, BENIGN: ICD-10-CM

## 2020-06-02 DIAGNOSIS — M79.642 BILATERAL HAND PAIN: ICD-10-CM

## 2020-06-02 DIAGNOSIS — E10.319 TYPE 1 DIABETES MELLITUS WITH RETINOPATHY OF BOTH EYES, MACULAR EDEMA PRESENCE UNSPECIFIED, UNSPECIFIED RETINOPATHY SEVERITY (H): ICD-10-CM

## 2020-06-02 RX ORDER — LOSARTAN POTASSIUM 50 MG/1
50 TABLET ORAL 2 TIMES DAILY
Qty: 180 TABLET | Refills: 3 | Status: SHIPPED | OUTPATIENT
Start: 2020-06-02

## 2020-06-30 ENCOUNTER — OFFICE VISIT - HEALTHEAST (OUTPATIENT)
Dept: FAMILY MEDICINE | Facility: CLINIC | Age: 55
End: 2020-06-30

## 2020-06-30 ENCOUNTER — AMBULATORY - HEALTHEAST (OUTPATIENT)
Dept: FAMILY MEDICINE | Facility: CLINIC | Age: 55
End: 2020-06-30

## 2020-06-30 DIAGNOSIS — E10.10 TYPE 1 DIABETES MELLITUS WITH KETOACIDOSIS WITHOUT COMA (H): ICD-10-CM

## 2020-06-30 DIAGNOSIS — E78.5 HYPERLIPIDEMIA, UNSPECIFIED HYPERLIPIDEMIA TYPE: ICD-10-CM

## 2020-06-30 DIAGNOSIS — N18.30 CHRONIC KIDNEY DISEASE, STAGE III (MODERATE) (H): ICD-10-CM

## 2020-06-30 DIAGNOSIS — I10 ESSENTIAL HYPERTENSION, BENIGN: ICD-10-CM

## 2020-06-30 DIAGNOSIS — M79.641 BILATERAL HAND PAIN: ICD-10-CM

## 2020-06-30 DIAGNOSIS — M79.642 BILATERAL HAND PAIN: ICD-10-CM

## 2020-06-30 LAB
ANION GAP SERPL CALCULATED.3IONS-SCNC: 8 MMOL/L (ref 5–18)
BUN SERPL-MCNC: 47 MG/DL (ref 8–22)
CALCIUM SERPL-MCNC: 9.1 MG/DL (ref 8.5–10.5)
CHLORIDE BLD-SCNC: 106 MMOL/L (ref 98–107)
CO2 SERPL-SCNC: 25 MMOL/L (ref 22–31)
CREAT SERPL-MCNC: 2.69 MG/DL (ref 0.7–1.3)
GFR SERPL CREATININE-BSD FRML MDRD: 25 ML/MIN/1.73M2
GLUCOSE BLD-MCNC: 217 MG/DL (ref 70–125)
HBA1C MFR BLD: 6.4 % (ref 3.5–6)
POTASSIUM BLD-SCNC: 4.2 MMOL/L (ref 3.5–5)
SODIUM SERPL-SCNC: 139 MMOL/L (ref 136–145)

## 2020-06-30 ASSESSMENT — ANXIETY QUESTIONNAIRES
1. FEELING NERVOUS, ANXIOUS, OR ON EDGE: MORE THAN HALF THE DAYS
GAD7 TOTAL SCORE: 10
7. FEELING AFRAID AS IF SOMETHING AWFUL MIGHT HAPPEN: SEVERAL DAYS
4. TROUBLE RELAXING: SEVERAL DAYS
5. BEING SO RESTLESS THAT IT IS HARD TO SIT STILL: MORE THAN HALF THE DAYS
2. NOT BEING ABLE TO STOP OR CONTROL WORRYING: SEVERAL DAYS
IF YOU CHECKED OFF ANY PROBLEMS ON THIS QUESTIONNAIRE, HOW DIFFICULT HAVE THESE PROBLEMS MADE IT FOR YOU TO DO YOUR WORK, TAKE CARE OF THINGS AT HOME, OR GET ALONG WITH OTHER PEOPLE: SOMEWHAT DIFFICULT
6. BECOMING EASILY ANNOYED OR IRRITABLE: SEVERAL DAYS
3. WORRYING TOO MUCH ABOUT DIFFERENT THINGS: MORE THAN HALF THE DAYS

## 2020-06-30 ASSESSMENT — PATIENT HEALTH QUESTIONNAIRE - PHQ9: SUM OF ALL RESPONSES TO PHQ QUESTIONS 1-9: 13

## 2020-07-01 ENCOUNTER — AMBULATORY - HEALTHEAST (OUTPATIENT)
Dept: FAMILY MEDICINE | Facility: CLINIC | Age: 55
End: 2020-07-01

## 2020-07-01 DIAGNOSIS — N28.9 ACUTE ON CHRONIC RENAL INSUFFICIENCY: ICD-10-CM

## 2020-07-01 DIAGNOSIS — N18.9 ACUTE ON CHRONIC RENAL INSUFFICIENCY: ICD-10-CM

## 2020-07-14 ENCOUNTER — AMBULATORY - HEALTHEAST (OUTPATIENT)
Dept: ENDOCRINOLOGY | Facility: CLINIC | Age: 55
End: 2020-07-14

## 2020-07-14 DIAGNOSIS — E10.22 TYPE 1 DIABETES MELLITUS WITH STAGE 3 CHRONIC KIDNEY DISEASE (H): ICD-10-CM

## 2020-07-14 DIAGNOSIS — N18.30 TYPE 1 DIABETES MELLITUS WITH STAGE 3 CHRONIC KIDNEY DISEASE (H): ICD-10-CM

## 2020-07-22 ENCOUNTER — COMMUNICATION - HEALTHEAST (OUTPATIENT)
Dept: FAMILY MEDICINE | Facility: CLINIC | Age: 55
End: 2020-07-22

## 2020-07-22 DIAGNOSIS — M79.641 BILATERAL HAND PAIN: ICD-10-CM

## 2020-07-22 DIAGNOSIS — M79.642 BILATERAL HAND PAIN: ICD-10-CM

## 2020-07-27 ENCOUNTER — COMMUNICATION - HEALTHEAST (OUTPATIENT)
Dept: FAMILY MEDICINE | Facility: CLINIC | Age: 55
End: 2020-07-27

## 2020-07-27 DIAGNOSIS — E78.5 HYPERLIPIDEMIA, UNSPECIFIED HYPERLIPIDEMIA TYPE: ICD-10-CM

## 2020-07-28 RX ORDER — SIMVASTATIN 20 MG
TABLET ORAL
Qty: 90 TABLET | Refills: 3 | Status: SHIPPED | OUTPATIENT
Start: 2020-07-28 | End: 2021-07-10

## 2020-08-13 ENCOUNTER — COMMUNICATION - HEALTHEAST (OUTPATIENT)
Dept: FAMILY MEDICINE | Facility: CLINIC | Age: 55
End: 2020-08-13

## 2020-08-20 ENCOUNTER — COMMUNICATION - HEALTHEAST (OUTPATIENT)
Dept: FAMILY MEDICINE | Facility: CLINIC | Age: 55
End: 2020-08-20

## 2020-08-20 ENCOUNTER — OFFICE VISIT - HEALTHEAST (OUTPATIENT)
Dept: FAMILY MEDICINE | Facility: CLINIC | Age: 55
End: 2020-08-20

## 2020-08-20 DIAGNOSIS — I10 ESSENTIAL HYPERTENSION, BENIGN: ICD-10-CM

## 2020-08-20 DIAGNOSIS — E10.10 TYPE 1 DIABETES MELLITUS WITH KETOACIDOSIS WITHOUT COMA (H): ICD-10-CM

## 2020-08-20 DIAGNOSIS — R20.2 PARESTHESIA OF HAND, BILATERAL: ICD-10-CM

## 2020-08-20 RX ORDER — GABAPENTIN 400 MG/1
800 CAPSULE ORAL 3 TIMES DAILY
Qty: 180 CAPSULE | Refills: 2 | Status: SHIPPED | OUTPATIENT
Start: 2020-08-20

## 2020-09-02 ENCOUNTER — AMBULATORY - HEALTHEAST (OUTPATIENT)
Dept: LAB | Facility: CLINIC | Age: 55
End: 2020-09-02

## 2020-09-02 DIAGNOSIS — E10.22 TYPE 1 DIABETES MELLITUS WITH STAGE 3 CHRONIC KIDNEY DISEASE (H): ICD-10-CM

## 2020-09-02 DIAGNOSIS — N18.30 TYPE 1 DIABETES MELLITUS WITH STAGE 3 CHRONIC KIDNEY DISEASE (H): ICD-10-CM

## 2020-09-02 LAB — HBA1C MFR BLD: 6.2 %

## 2020-09-08 ENCOUNTER — COMMUNICATION - HEALTHEAST (OUTPATIENT)
Dept: SCHEDULING | Facility: CLINIC | Age: 55
End: 2020-09-08

## 2020-09-08 ENCOUNTER — COMMUNICATION - HEALTHEAST (OUTPATIENT)
Dept: ENDOCRINOLOGY | Facility: CLINIC | Age: 55
End: 2020-09-08

## 2020-09-08 DIAGNOSIS — E10.9 TYPE 1 DIABETES MELLITUS (H): ICD-10-CM

## 2020-09-10 ENCOUNTER — HOSPITAL ENCOUNTER (OUTPATIENT)
Dept: PHYSICAL MEDICINE AND REHAB | Facility: CLINIC | Age: 55
Discharge: HOME | End: 2020-09-10
Attending: FAMILY MEDICINE

## 2020-09-10 DIAGNOSIS — R20.2 PARESTHESIA OF HAND, BILATERAL: ICD-10-CM

## 2020-09-23 ENCOUNTER — OFFICE VISIT - HEALTHEAST (OUTPATIENT)
Dept: ENDOCRINOLOGY | Facility: CLINIC | Age: 55
End: 2020-09-23

## 2020-09-23 DIAGNOSIS — N18.30 TYPE 1 DIABETES MELLITUS WITH STAGE 3 CHRONIC KIDNEY DISEASE (H): ICD-10-CM

## 2020-09-23 DIAGNOSIS — E10.319 TYPE 1 DIABETES MELLITUS WITH RETINOPATHY OF BOTH EYES, MACULAR EDEMA PRESENCE UNSPECIFIED, UNSPECIFIED RETINOPATHY SEVERITY (H): ICD-10-CM

## 2020-09-23 DIAGNOSIS — E10.22 TYPE 1 DIABETES MELLITUS WITH STAGE 3 CHRONIC KIDNEY DISEASE (H): ICD-10-CM

## 2020-09-23 RX ORDER — INSULIN GLARGINE 100 [IU]/ML
27 INJECTION, SOLUTION SUBCUTANEOUS AT BEDTIME
Qty: 25 ML | Refills: 3 | Status: SHIPPED | OUTPATIENT
Start: 2020-09-23 | End: 2021-11-10

## 2020-10-06 ENCOUNTER — COMMUNICATION - HEALTHEAST (OUTPATIENT)
Dept: FAMILY MEDICINE | Facility: CLINIC | Age: 55
End: 2020-10-06

## 2020-10-06 ENCOUNTER — OFFICE VISIT - HEALTHEAST (OUTPATIENT)
Dept: RHEUMATOLOGY | Facility: CLINIC | Age: 55
End: 2020-10-06

## 2020-10-06 ENCOUNTER — AMBULATORY - HEALTHEAST (OUTPATIENT)
Dept: LAB | Facility: CLINIC | Age: 55
End: 2020-10-06

## 2020-10-06 DIAGNOSIS — M25.50 POLYARTHRALGIA: ICD-10-CM

## 2020-10-06 DIAGNOSIS — G56.03 BILATERAL CARPAL TUNNEL SYNDROME: ICD-10-CM

## 2020-10-06 LAB
ALBUMIN SERPL-MCNC: 3.6 G/DL (ref 3.5–5)
ALT SERPL W P-5'-P-CCNC: 14 U/L (ref 0–45)
BASOPHILS # BLD AUTO: 0 THOU/UL (ref 0–0.2)
BASOPHILS NFR BLD AUTO: 1 % (ref 0–2)
C REACTIVE PROTEIN LHE: 0.4 MG/DL (ref 0–0.8)
CCP AB SER IA-ACNC: <0.5 U/ML
CREAT SERPL-MCNC: 1.97 MG/DL (ref 0.7–1.3)
EOSINOPHIL # BLD AUTO: 0.3 THOU/UL (ref 0–0.4)
EOSINOPHIL NFR BLD AUTO: 6 % (ref 0–6)
ERYTHROCYTE [DISTWIDTH] IN BLOOD BY AUTOMATED COUNT: 12.2 % (ref 11–14.5)
ERYTHROCYTE [SEDIMENTATION RATE] IN BLOOD BY WESTERGREN METHOD: 8 MM/HR (ref 0–15)
GFR SERPL CREATININE-BSD FRML MDRD: 35 ML/MIN/1.73M2
HCT VFR BLD AUTO: 37.7 % (ref 40–54)
HGB BLD-MCNC: 12.8 G/DL (ref 14–18)
LYMPHOCYTES # BLD AUTO: 1.2 THOU/UL (ref 0.8–4.4)
LYMPHOCYTES NFR BLD AUTO: 26 % (ref 20–40)
MCH RBC QN AUTO: 30 PG (ref 27–34)
MCHC RBC AUTO-ENTMCNC: 34 G/DL (ref 32–36)
MCV RBC AUTO: 88 FL (ref 80–100)
MONOCYTES # BLD AUTO: 0.3 THOU/UL (ref 0–0.9)
MONOCYTES NFR BLD AUTO: 7 % (ref 2–10)
NEUTROPHILS # BLD AUTO: 2.8 THOU/UL (ref 2–7.7)
NEUTROPHILS NFR BLD AUTO: 61 % (ref 50–70)
PLATELET # BLD AUTO: 133 THOU/UL (ref 140–440)
PMV BLD AUTO: 8.3 FL (ref 7–10)
RBC # BLD AUTO: 4.26 MILL/UL (ref 4.4–6.2)
RHEUMATOID FACT SERPL-ACNC: <15 IU/ML (ref 0–30)
URATE SERPL-MCNC: 7.6 MG/DL (ref 3–8)
WBC: 4.6 THOU/UL (ref 4–11)

## 2020-10-06 ASSESSMENT — MIFFLIN-ST. JEOR: SCORE: 1795.15

## 2020-10-07 LAB — HCV AB SERPL QL IA: NEGATIVE

## 2020-10-08 LAB — ANA SER QL: 3.9 U

## 2020-10-13 LAB
DNA (DS) ANTIBODY - HISTORICAL: 12 IU
JO-1 AUTOANTIBODIES - HISTORICAL: 0 EU
SCL-70 AUTOANTIBODIES - HISTORICAL: 15 EU
SM (SMITH AUTOANTIBODIES - HISTORICAL: 6 EU
SM/RNP AUTOANTIBODIES - HISTORICAL: 2 EU
SS-A/RO AUTOANTIBODIES - HISTORICAL: 5 EU
SS-B/LA AUTOANTIBODIES - HISTORICAL: 1 EU

## 2020-10-15 ENCOUNTER — COMMUNICATION - HEALTHEAST (OUTPATIENT)
Dept: FAMILY MEDICINE | Facility: CLINIC | Age: 55
End: 2020-10-15

## 2020-10-15 DIAGNOSIS — G25.81 RESTLESS LEG SYNDROME: ICD-10-CM

## 2020-10-15 RX ORDER — PRAMIPEXOLE DIHYDROCHLORIDE 0.25 MG/1
0.5 TABLET ORAL AT BEDTIME
Qty: 28 TABLET | Refills: 4 | Status: SHIPPED | OUTPATIENT
Start: 2020-10-15

## 2020-11-05 ENCOUNTER — COMMUNICATION - HEALTHEAST (OUTPATIENT)
Dept: RHEUMATOLOGY | Facility: CLINIC | Age: 55
End: 2020-11-05

## 2020-11-05 DIAGNOSIS — G56.03 BILATERAL CARPAL TUNNEL SYNDROME: ICD-10-CM

## 2020-11-05 DIAGNOSIS — M25.50 POLYARTHRALGIA: ICD-10-CM

## 2020-11-11 ENCOUNTER — COMMUNICATION - HEALTHEAST (OUTPATIENT)
Dept: RHEUMATOLOGY | Facility: CLINIC | Age: 55
End: 2020-11-11

## 2020-11-11 ENCOUNTER — OFFICE VISIT - HEALTHEAST (OUTPATIENT)
Dept: RHEUMATOLOGY | Facility: CLINIC | Age: 55
End: 2020-11-11

## 2020-11-11 DIAGNOSIS — R76.8 ANA POSITIVE: ICD-10-CM

## 2020-11-11 DIAGNOSIS — N18.32 STAGE 3B CHRONIC KIDNEY DISEASE (H): ICD-10-CM

## 2020-11-11 DIAGNOSIS — M06.4 INFLAMMATORY POLYARTHRITIS (H): ICD-10-CM

## 2020-11-11 DIAGNOSIS — Z79.899 HIGH RISK MEDICATION USE: ICD-10-CM

## 2020-11-13 RX ORDER — FOLIC ACID 1 MG/1
TABLET ORAL
Qty: 90 TABLET | Refills: 3 | Status: SHIPPED | OUTPATIENT
Start: 2020-11-13 | End: 2021-11-10

## 2020-12-01 ENCOUNTER — COMMUNICATION - HEALTHEAST (OUTPATIENT)
Dept: ENDOCRINOLOGY | Facility: CLINIC | Age: 55
End: 2020-12-01

## 2020-12-01 DIAGNOSIS — E10.9 TYPE 1 DIABETES MELLITUS (H): ICD-10-CM

## 2020-12-02 ENCOUNTER — OFFICE VISIT - HEALTHEAST (OUTPATIENT)
Dept: FAMILY MEDICINE | Facility: CLINIC | Age: 55
End: 2020-12-02

## 2020-12-02 DIAGNOSIS — I10 ESSENTIAL HYPERTENSION, BENIGN: ICD-10-CM

## 2020-12-02 DIAGNOSIS — N18.32 STAGE 3B CHRONIC KIDNEY DISEASE (H): ICD-10-CM

## 2020-12-02 DIAGNOSIS — R80.9 TYPE 1 DIABETES MELLITUS WITH MICROALBUMINURIA (H): ICD-10-CM

## 2020-12-02 DIAGNOSIS — R11.2 NON-INTRACTABLE VOMITING WITH NAUSEA, UNSPECIFIED VOMITING TYPE: ICD-10-CM

## 2020-12-02 DIAGNOSIS — E10.29 TYPE 1 DIABETES MELLITUS WITH MICROALBUMINURIA (H): ICD-10-CM

## 2020-12-02 LAB
ALBUMIN SERPL-MCNC: 3.5 G/DL (ref 3.5–5)
ALP SERPL-CCNC: 118 U/L (ref 45–120)
ALT SERPL W P-5'-P-CCNC: 13 U/L (ref 0–45)
ANION GAP SERPL CALCULATED.3IONS-SCNC: 10 MMOL/L (ref 5–18)
AST SERPL W P-5'-P-CCNC: 10 U/L (ref 0–40)
BILIRUB SERPL-MCNC: 0.3 MG/DL (ref 0–1)
BUN SERPL-MCNC: 56 MG/DL (ref 8–22)
CALCIUM SERPL-MCNC: 8.4 MG/DL (ref 8.5–10.5)
CHLORIDE BLD-SCNC: 103 MMOL/L (ref 98–107)
CO2 SERPL-SCNC: 27 MMOL/L (ref 22–31)
CREAT SERPL-MCNC: 2.83 MG/DL (ref 0.7–1.3)
ERYTHROCYTE [DISTWIDTH] IN BLOOD BY AUTOMATED COUNT: 12.5 % (ref 11–14.5)
GFR SERPL CREATININE-BSD FRML MDRD: 23 ML/MIN/1.73M2
GLUCOSE BLD-MCNC: 314 MG/DL (ref 70–125)
HBA1C MFR BLD: 6.8 %
HCT VFR BLD AUTO: 39.7 % (ref 40–54)
HGB BLD-MCNC: 13 G/DL (ref 14–18)
LIPASE SERPL-CCNC: <9 U/L (ref 0–52)
MCH RBC QN AUTO: 29.3 PG (ref 27–34)
MCHC RBC AUTO-ENTMCNC: 32.8 G/DL (ref 32–36)
MCV RBC AUTO: 89 FL (ref 80–100)
PLATELET # BLD AUTO: 124 THOU/UL (ref 140–440)
PMV BLD AUTO: 9.1 FL (ref 7–10)
POTASSIUM BLD-SCNC: 4.9 MMOL/L (ref 3.5–5)
PROT SERPL-MCNC: 6.2 G/DL (ref 6–8)
RBC # BLD AUTO: 4.45 MILL/UL (ref 4.4–6.2)
SODIUM SERPL-SCNC: 140 MMOL/L (ref 136–145)
WBC: 3.9 THOU/UL (ref 4–11)

## 2020-12-02 RX ORDER — ONDANSETRON 4 MG/1
4 TABLET, ORALLY DISINTEGRATING ORAL EVERY 8 HOURS PRN
Qty: 20 TABLET | Refills: 1 | Status: SHIPPED | OUTPATIENT
Start: 2020-12-02 | End: 2021-07-20

## 2020-12-03 ASSESSMENT — ANXIETY QUESTIONNAIRES
7. FEELING AFRAID AS IF SOMETHING AWFUL MIGHT HAPPEN: MORE THAN HALF THE DAYS
6. BECOMING EASILY ANNOYED OR IRRITABLE: MORE THAN HALF THE DAYS
3. WORRYING TOO MUCH ABOUT DIFFERENT THINGS: SEVERAL DAYS
5. BEING SO RESTLESS THAT IT IS HARD TO SIT STILL: SEVERAL DAYS
4. TROUBLE RELAXING: SEVERAL DAYS
GAD7 TOTAL SCORE: 11
2. NOT BEING ABLE TO STOP OR CONTROL WORRYING: MORE THAN HALF THE DAYS
1. FEELING NERVOUS, ANXIOUS, OR ON EDGE: MORE THAN HALF THE DAYS

## 2020-12-04 ENCOUNTER — COMMUNICATION - HEALTHEAST (OUTPATIENT)
Dept: SCHEDULING | Facility: CLINIC | Age: 55
End: 2020-12-04

## 2020-12-22 ENCOUNTER — COMMUNICATION - HEALTHEAST (OUTPATIENT)
Dept: FAMILY MEDICINE | Facility: CLINIC | Age: 55
End: 2020-12-22

## 2020-12-22 ENCOUNTER — COMMUNICATION - HEALTHEAST (OUTPATIENT)
Dept: SCHEDULING | Facility: CLINIC | Age: 55
End: 2020-12-22

## 2020-12-22 DIAGNOSIS — I10 ESSENTIAL HYPERTENSION, BENIGN: ICD-10-CM

## 2020-12-22 RX ORDER — METOPROLOL TARTRATE 100 MG
100 TABLET ORAL 2 TIMES DAILY
Qty: 180 TABLET | Refills: 3 | Status: SHIPPED | OUTPATIENT
Start: 2020-12-22 | End: 2021-12-20

## 2021-01-11 ENCOUNTER — AMBULATORY - HEALTHEAST (OUTPATIENT)
Dept: LAB | Facility: CLINIC | Age: 56
End: 2021-01-11

## 2021-01-11 DIAGNOSIS — Z79.899 HIGH RISK MEDICATION USE: ICD-10-CM

## 2021-01-11 LAB
ALBUMIN SERPL-MCNC: 3.5 G/DL (ref 3.5–5)
ALT SERPL W P-5'-P-CCNC: 16 U/L (ref 0–45)
CREAT SERPL-MCNC: 2.37 MG/DL (ref 0.7–1.3)
ERYTHROCYTE [DISTWIDTH] IN BLOOD BY AUTOMATED COUNT: 13 % (ref 11–14.5)
GFR SERPL CREATININE-BSD FRML MDRD: 29 ML/MIN/1.73M2
HCT VFR BLD AUTO: 36.9 % (ref 40–54)
HGB BLD-MCNC: 12.5 G/DL (ref 14–18)
MCH RBC QN AUTO: 30.4 PG (ref 27–34)
MCHC RBC AUTO-ENTMCNC: 33.9 G/DL (ref 32–36)
MCV RBC AUTO: 90 FL (ref 80–100)
PLATELET # BLD AUTO: 179 THOU/UL (ref 140–440)
PMV BLD AUTO: 8.1 FL (ref 7–10)
RBC # BLD AUTO: 4.13 MILL/UL (ref 4.4–6.2)
WBC: 6.6 THOU/UL (ref 4–11)

## 2021-01-13 ENCOUNTER — COMMUNICATION - HEALTHEAST (OUTPATIENT)
Dept: FAMILY MEDICINE | Facility: CLINIC | Age: 56
End: 2021-01-13

## 2021-01-21 ENCOUNTER — COMMUNICATION - HEALTHEAST (OUTPATIENT)
Dept: RHEUMATOLOGY | Facility: CLINIC | Age: 56
End: 2021-01-21

## 2021-01-21 DIAGNOSIS — M06.4 INFLAMMATORY POLYARTHRITIS (H): ICD-10-CM

## 2021-02-23 ENCOUNTER — COMMUNICATION - HEALTHEAST (OUTPATIENT)
Dept: RHEUMATOLOGY | Facility: CLINIC | Age: 56
End: 2021-02-23

## 2021-02-24 ENCOUNTER — COMMUNICATION - HEALTHEAST (OUTPATIENT)
Dept: RHEUMATOLOGY | Facility: CLINIC | Age: 56
End: 2021-02-24

## 2021-02-24 DIAGNOSIS — M06.4 INFLAMMATORY POLYARTHRITIS (H): ICD-10-CM

## 2021-03-01 ENCOUNTER — AMBULATORY - HEALTHEAST (OUTPATIENT)
Dept: LAB | Facility: CLINIC | Age: 56
End: 2021-03-01

## 2021-03-01 DIAGNOSIS — Z79.899 HIGH RISK MEDICATION USE: ICD-10-CM

## 2021-03-01 LAB
ALBUMIN SERPL-MCNC: 3.5 G/DL (ref 3.5–5)
ALT SERPL W P-5'-P-CCNC: 12 U/L (ref 0–45)
CREAT SERPL-MCNC: 2.32 MG/DL (ref 0.7–1.3)
ERYTHROCYTE [DISTWIDTH] IN BLOOD BY AUTOMATED COUNT: 14.6 % (ref 11–14.5)
GFR SERPL CREATININE-BSD FRML MDRD: 29 ML/MIN/1.73M2
HCT VFR BLD AUTO: 36.1 % (ref 40–54)
HGB BLD-MCNC: 11.7 G/DL (ref 14–18)
MCH RBC QN AUTO: 31.2 PG (ref 27–34)
MCHC RBC AUTO-ENTMCNC: 32.4 G/DL (ref 32–36)
MCV RBC AUTO: 96 FL (ref 80–100)
PLATELET # BLD AUTO: 165 THOU/UL (ref 140–440)
PMV BLD AUTO: 10.7 FL (ref 7–10)
RBC # BLD AUTO: 3.75 MILL/UL (ref 4.4–6.2)
WBC: 7 THOU/UL (ref 4–11)

## 2021-03-04 ENCOUNTER — OFFICE VISIT - HEALTHEAST (OUTPATIENT)
Dept: RHEUMATOLOGY | Facility: CLINIC | Age: 56
End: 2021-03-04

## 2021-03-04 ENCOUNTER — COMMUNICATION - HEALTHEAST (OUTPATIENT)
Dept: LAB | Facility: CLINIC | Age: 56
End: 2021-03-04

## 2021-03-04 DIAGNOSIS — N18.30 TYPE 1 DIABETES MELLITUS WITH STAGE 3 CHRONIC KIDNEY DISEASE (H): ICD-10-CM

## 2021-03-04 DIAGNOSIS — M06.09 SERONEGATIVE RHEUMATOID ARTHRITIS OF MULTIPLE SITES (H): ICD-10-CM

## 2021-03-04 DIAGNOSIS — R76.8 ANA POSITIVE: ICD-10-CM

## 2021-03-04 DIAGNOSIS — Z79.899 HIGH RISK MEDICATION USE: ICD-10-CM

## 2021-03-04 DIAGNOSIS — E10.22 TYPE 1 DIABETES MELLITUS WITH STAGE 3 CHRONIC KIDNEY DISEASE (H): ICD-10-CM

## 2021-03-04 DIAGNOSIS — N18.32 STAGE 3B CHRONIC KIDNEY DISEASE (H): ICD-10-CM

## 2021-03-16 ENCOUNTER — AMBULATORY - HEALTHEAST (OUTPATIENT)
Dept: LAB | Facility: CLINIC | Age: 56
End: 2021-03-16

## 2021-03-16 DIAGNOSIS — E10.22 TYPE 1 DIABETES MELLITUS WITH STAGE 3 CHRONIC KIDNEY DISEASE (H): ICD-10-CM

## 2021-03-16 DIAGNOSIS — N18.30 TYPE 1 DIABETES MELLITUS WITH STAGE 3 CHRONIC KIDNEY DISEASE (H): ICD-10-CM

## 2021-03-16 LAB
HBA1C MFR BLD: 6.1 %
TSH SERPL DL<=0.005 MIU/L-ACNC: 5.23 UIU/ML (ref 0.3–5)

## 2021-03-23 ENCOUNTER — OFFICE VISIT - HEALTHEAST (OUTPATIENT)
Dept: ENDOCRINOLOGY | Facility: CLINIC | Age: 56
End: 2021-03-23

## 2021-03-23 DIAGNOSIS — E10.29: ICD-10-CM

## 2021-03-23 DIAGNOSIS — E10.9 TYPE 1 DIABETES MELLITUS (H): ICD-10-CM

## 2021-03-23 RX ORDER — TRAZODONE HYDROCHLORIDE 100 MG/1
TABLET ORAL
Status: SHIPPED | COMMUNITY
Start: 2021-03-16

## 2021-05-05 ENCOUNTER — COMMUNICATION - HEALTHEAST (OUTPATIENT)
Dept: INTERNAL MEDICINE | Facility: CLINIC | Age: 56
End: 2021-05-05

## 2021-05-11 ENCOUNTER — RECORDS - HEALTHEAST (OUTPATIENT)
Dept: ADMINISTRATIVE | Facility: OTHER | Age: 56
End: 2021-05-11

## 2021-05-26 ENCOUNTER — RECORDS - HEALTHEAST (OUTPATIENT)
Dept: ADMINISTRATIVE | Facility: CLINIC | Age: 56
End: 2021-05-26

## 2021-05-26 ASSESSMENT — PATIENT HEALTH QUESTIONNAIRE - PHQ9: SUM OF ALL RESPONSES TO PHQ QUESTIONS 1-9: 13

## 2021-05-27 ENCOUNTER — AMBULATORY - HEALTHEAST (OUTPATIENT)
Dept: LAB | Facility: CLINIC | Age: 56
End: 2021-05-27

## 2021-05-27 DIAGNOSIS — Z79.899 HIGH RISK MEDICATION USE: ICD-10-CM

## 2021-05-27 DIAGNOSIS — E10.29: ICD-10-CM

## 2021-05-27 LAB
ALBUMIN SERPL-MCNC: 3.6 G/DL (ref 3.5–5)
ALT SERPL W P-5'-P-CCNC: 12 U/L (ref 0–45)
ANION GAP SERPL CALCULATED.3IONS-SCNC: 13 MMOL/L (ref 5–18)
BUN SERPL-MCNC: 39 MG/DL (ref 8–22)
CALCIUM SERPL-MCNC: 8.6 MG/DL (ref 8.5–10.5)
CHLORIDE BLD-SCNC: 106 MMOL/L (ref 98–107)
CO2 SERPL-SCNC: 24 MMOL/L (ref 22–31)
CREAT SERPL-MCNC: 2.47 MG/DL (ref 0.7–1.3)
ERYTHROCYTE [DISTWIDTH] IN BLOOD BY AUTOMATED COUNT: 12 % (ref 11–14.5)
GFR SERPL CREATININE-BSD FRML MDRD: 27 ML/MIN/1.73M2
GLUCOSE BLD-MCNC: 195 MG/DL (ref 70–125)
HBA1C MFR BLD: 6.5 %
HCT VFR BLD AUTO: 38.9 % (ref 40–54)
HGB BLD-MCNC: 12.8 G/DL (ref 14–18)
MCH RBC QN AUTO: 29.8 PG (ref 27–34)
MCHC RBC AUTO-ENTMCNC: 32.9 G/DL (ref 32–36)
MCV RBC AUTO: 91 FL (ref 80–100)
PLATELET # BLD AUTO: 123 THOU/UL (ref 140–440)
PMV BLD AUTO: 11.6 FL (ref 7–10)
POTASSIUM BLD-SCNC: 4.7 MMOL/L (ref 3.5–5)
RBC # BLD AUTO: 4.3 MILL/UL (ref 4.4–6.2)
SODIUM SERPL-SCNC: 143 MMOL/L (ref 136–145)
WBC: 4.3 THOU/UL (ref 4–11)

## 2021-05-27 ASSESSMENT — PATIENT HEALTH QUESTIONNAIRE - PHQ9: SUM OF ALL RESPONSES TO PHQ QUESTIONS 1-9: 13

## 2021-05-27 NOTE — PROGRESS NOTES
MTM Follow Up Encounter  Assessment & Plan                                                     Type 1 Diabetes: A1c improved. Will have him continue current insulin doses. I am still working on getting DexCom G6 covered for him.      Hypertension: BP well controlled today. BMP and Mag level pending.       Neuropathy: Patient is still taking previous gabapentin dose. Since he is complaining of worsening neuropathy, rather than decreasing gabapentin, will increase daytime dose. He will take 100 mg x2 (200 mg) during the day. When done, will switch to 300 mg strength since 100 mg will no longer be covered.   PLAN:  1. Increase daytime gabapentin from 100 mg to 200 mg AM     RLS: Patient still has 0.125 mg at home, therefore continue then switch to 0.25 mg strength.     GERD: Possible that he is having acid reflux in the middle of the night. Recommended elevating the head of the bed with a 2x4 wood. Will also see if pantoprazole is covered for him instead.   PLAN:   1. Elevate head of the bed.   2. I will see if pantoprazole 40 mg is covered. Will start with once daily    Follow Up  1-2 week [facility]    Subjective & Objective                                                       Abdi Joseph is a 54 y.o. male coming in for a follow up visit for Medication Therapy Management. He was referred to me from Fernando Hannon MD    Type 1 Diabetes: Currently taking Lantus 25 units HS and Novolog sliding scale. Admitted 2/26 for DKA.  At last MTM appointment I set him up with a new correction scale and base insulin.  Switched Novolog to R in hospital. He is ok with continuing the vials. Per DME, gastroparesis so Regular insulin (used for cost issues) may be a good choice as it has slower peak, longer duration.   Brought glucometer today.   Blood sugars per glucometer =  4 AM = 216, 151, 240, 319, 284, 318, 152  7 AM = 172  10 AM = 172, 56  12 PM = 83  2-3 PM = 168, 151, 324, 84  4 PM = 150, 151, 343  5 to 6:30 PM = 201, 128,  301, 159  9 PM = 209, 240  12 AM = 78, 216, 210  2 AM = 274, 245  Last A1c checked today = 7.4%, previously 12/12/18 = 8.3%.   Hypoglycemia  once in the last few weeks, value of 56.  Using a AccuChek meter. Interested in DexCom.   Microalbumin checked 2/21/19  Used to see endocrinology, but endo retired. Is interested in meeting with someone to discuss nutrition and carb counting.   Has upcoming appointment DME on 4/1  Taking simvastatin 20 mg HS. Last LDL checked today, results pending  Is taking aspirin 81 mg for primary prevention.     Novolog dose:   2 units Breakfast (if just eggs, give 0 base)  4 units Lunch  4 units Dinner  Sliding scale  150-200: add 1  201-250: add 2  251-300: add 3  301-350: add 4  351-400: add 5     Hypertension: Currently taking metoprolol tartrate 50 mg x2 (100 mg) two times a day and lisinopril 5 mg daily -- started 2/21/19. He felt like the addition of lisinopril has been helpful. Denies lightheadedness/dizziness. ALBERTA in hospital. Lasix was held at discharge and recommended to defer to PCP when to resume.   At last MTM appointment we continued him off the furosemide.  Has leg swelling, but it is not bothersome.  Does not feel like it is any different than when he was on furosemide.  Magnesium level pending today.     Neuropathy: Currently taking gabapentin 100 mg AM and 400 mg HS. 100 mg strength will not be covered by [employer] anymore. Has about 1 month left.   At last MTM appointment we were going to discontinue the 100 mg strength since it was not covered, but he still has a lot at home.  He complains today of his neuropathy bothering him during the day which is affecting his ability to play the guitar.     RLS: Currently taking pramipexole 0.125 mg x3 (0.375 mg) HS.  at last MTM appointment we switched to 0.25 mg tablet to decrease pill burden, but he is still finishing up the 0.125 mg strength that he has at home.  Feels the same.     GERD: Currently taking omeprazole 20 mg twice  daily before breakfast and dinner.  Reports that he feels like he gets food caught in his throat.  Reports getting up in the middle the night to cough, wonders if it is acid because his pillowcase is orange.  Does not elevate the head of the bed.  Used to be on Protonix which she felt was more helpful for him.      PMH: reviewed in EPIC   Allergies/ADRs: reviewed in EPIC   Alcohol: reviewed in EPIC   Tobacco:   Social History     Tobacco Use   Smoking Status Never Smoker   Smokeless Tobacco Never Used     Today's Vitals: There were no vitals filed for this visit.  ----------------    Much or all of the text in this note was generated through the use of Dragon Dictate voice-to-text software. Errors in spelling or words which seem out of context are unintentional. Sound alike errors, in particular, may have escaped editing.    The patient declined an after visit summary    I spent 30 minutes with this patient today;   All changes were made via collaborative practice agreement with Fernando Hannon MD. A copy of the visit note was provided to the patient's provider.     Clara Gilliam, Pharm.D., BCACP  Medication Therapy Management Pharmacist  [facility]     Current Outpatient Medications   Medication Sig Dispense Refill     acetaminophen (TYLENOL) 500 MG tablet Take 1-2 tablets (500-1,000 mg total) by mouth every 4 (four) hours as needed.  0     aspirin 81 MG EC tablet Take 81 mg by mouth daily.       blood glucose test strips Use 1 each As Directed as needed Dispense brand per patient's insurance at pharmacy discretion.. 200 strip 11     blood-glucose meter,continuous (DEXCOM G6 ) Misc Use as directed for continuous glucose monitoring 1 each 0     blood-glucose sensor (DEXCOM G6 SENSOR) Belia Use as directed for continuous glucose monitoring. Change every 10 days. 1 Device 13     blood-glucose transmitter (DEXCOM G6 TRANSMITTER) Belia Use as directed for continuous glucose monitoring.  "Change every 4 months. 1 Device 4     calcium, as carbonate, (TUMS) 200 mg calcium (500 mg) chewable tablet Chew 1 tablet 3 (three) times a day as needed for heartburn.       clonazePAM (KLONOPIN) 1 MG tablet Take 1 tablet (1 mg total) by mouth every morning. 4 tablet 0     clonazePAM (KLONOPIN) 2 MG tablet Take 2 mg by mouth at bedtime.       docusate sodium (COLACE) 100 MG capsule Take 100 mg by mouth 2 (two) times a day.       gabapentin (NEURONTIN) 100 MG capsule Take 100 mg by mouth every morning.       gabapentin (NEURONTIN) 400 MG capsule Take 400 mg by mouth bedtime.       insulin syringe-needle U-100 (INSULIN SYRINGE) 1 mL 29 gauge x 1/2\" Syrg Use 1 each As Directed as needed (with insulin). 200 each 11     LANTUS U-100 INSULIN 100 unit/mL injection Inject 25 Units under the skin at bedtime. 10 mL PRN     lisinopril (PRINIVIL,ZESTRIL) 5 MG tablet Take 1 tablet (5 mg total) by mouth daily. 90 tablet 3     methocarbamol (ROBAXIN) 750 MG tablet Take 750 mg by mouth 3 (three) times a day as needed.        metoprolol tartrate (LOPRESSOR) 100 MG tablet Take 1 tablet (100 mg total) by mouth 2 (two) times a day. 180 tablet 1     mirtazapine (REMERON) 15 MG tablet Take 15 mg by mouth bedtime.        NOVOLIN R REGULAR U-100 INSULN 100 unit/mL injection Check blood sugar four (4) times daily.  10.65 Type 1 with hyperglycemia  No supplies needed  BD Ultra-fine 6 mm 31G 0.3 mL syringe - NDC [address]-48 - dispense 1 case, refill PRN for 1 year 10 mL PRN     omeprazole (PRILOSEC) 20 MG capsule Take 1 capsule (20 mg total) by mouth 2 (two) times a day before meals. 60 capsule 5     ondansetron (ZOFRAN) 4 MG tablet Take 4 mg by mouth every 8 (eight) hours as needed for nausea.        pramipexole (MIRAPEX) 0.25 MG tablet Take 1 tablet (0.25 mg total) by mouth at bedtime. 90 tablet 1     QUEtiapine (SEROQUEL) 300 MG tablet Take 600 mg by mouth bedtime.       QUEtiapine (SEROQUEL) 50 MG tablet Take 50 mg by mouth 2 (two) " times a day Take with breakfast and lunch..             sildenafil, antihypertensive, (REVATIO) 20 mg tablet Take 2-3 tablets (40-60 mg total) by mouth daily as needed. (Patient taking differently: Take 50 mg by mouth daily as needed.       ) 30 tablet 2     silver sulfADIAZINE (SILVADENE, SSD) 1 % cream Apply 1 application topically 2 (two) times a day.       simvastatin (ZOCOR) 20 MG tablet Take 1 tablet (20 mg total) by mouth at bedtime. At bedtime 90 tablet 3     traZODone (DESYREL) 50 MG tablet Take 1 tablet (50 mg total) by mouth at bedtime. 30 tablet 0     No current facility-administered medications for this visit.      Facility-Administered Medications Ordered in Other Visits   Medication Dose Route Frequency Provider Last Rate Last Dose     bupivacaine-epinephrine (PF) 0.75 %-1:200,000 1.8 mL, EPINEPHrine 0.01 mL, fentaNYL pf 50 mcg/mL 0.5 mL    Continuous PRN WILLIAM Starkey MBBS   2.31 mL at 09/09/14 0723

## 2021-05-27 NOTE — TELEPHONE ENCOUNTER
protonix pended for approval to use twice daily if appropriate  Please resend viagra to [facility] in [place].

## 2021-05-27 NOTE — TELEPHONE ENCOUNTER
Medication Request  Medication name:  Sildenafil higher dose than 20 mg  Pharmacy Name and Location:  [facility]  Reason for request:  ED  When did you use medication last?:   1/21/2019  Patient offered appointment:   no  Okay to leave a detailed message: yes

## 2021-05-27 NOTE — TELEPHONE ENCOUNTER
1) Medication Question or Clarification  Who is calling: Patient  What medication are you calling about? (include dose and sig)    Disp Refills Start End    pantoprazole (PROTONIX) 40 MG tablet 30 tablet 1 3/28/2019     Sig - Route: Take 1 tablet (40 mg total) by mouth daily. - Oral    Sent to pharmacy as: pantoprazole (PROTONIX) 40 MG tablet    E-Prescribing Status: Receipt confirmed by pharmacy (3/28/2019 12:57 PM CDT)      Who prescribed the medication?: Fernando Hannon MD   What is your question/concern?: Patient stated he has his upper endoscopy and he was told by the gastroenterologist that he should be on 1 tab by mouth twice a day, dose. Patient is calling to get an Rx for a twice per day dose.  Pharmacy: [facility] to leave a detailed message?: Yes  [phone]  [facility] - Please call the pharmacy to obtain any additional needed information.    2) Medication Question or Clarification  Who is calling: Patient  What medication are you calling about?:    Disp Refills Start End    sildenafil (VIAGRA) 50 MG tablet 20 tablet 1 4/11/2019     Sig - Route: Take 1 tablet (50 mg total) by mouth as needed for erectile dysfunction. Max 100 mg/ 24 hour. - Oral    Sent to pharmacy as: sildenafil (VIAGRA) 50 MG tablet    E-Prescribing Status: Receipt confirmed by pharmacy (4/11/2019  4:25 PM CDT)      Who prescribed the medication?: Fernando Hannon MD   What is your question/concern?: Patient stated the Viagra should've gone to [facility] in [place] and not [facility]. Please re-send this Rx to [facility].  Pharmacy: [facility] to leave a detailed message?: Yes    [phone]  [facility] - Please call the pharmacy to obtain any additional needed information.

## 2021-05-27 NOTE — PROGRESS NOTES
Assessment/Plan:    1. Type 1 Diabetes Mellitus  Type 1 diabetes, improved control.  Recent hospitalization February 26, 2019 through March 2, 2019 secondary to diabetic ketoacidosis.  Patient feels like he is doing much better without hypoglycemic concerns nor hyperglycemia described.  Using Lantus insulin 26 units at bedtime plus NovoLog insulin between 4 and 5 units between 4 and 6 doses daily typically.  Reassess at follow-up no later than 4 months, sooner if concerns.  Working with [facility] pharmacist with dose adjustments as needed.  - Glycosylated Hemoglobin A1c  - Basic Metabolic Panel    2. Secondary hypertension  History of hypertension.  Continues metoprolol tartrate 50 mg using 2 tablets twice daily plus lisinopril 5 mg daily.  Occasional cough worse at night described.  Blood pressure 120/60.  - Magnesium    3. Dysphagia, unspecified type  Describe dysphagia.  Upper endoscopy to be completed.  - Ambulatory referral for Upper GI Endoscopy    4. Essential hypertension, benign  Hypertension as above, stable.  Check basic metabolic panel to ensure stable renal function.  - Basic Metabolic Panel    5. Chronic Kidney Disease, Stage 3  History of CKD stage III.  Base metabolic panel results pending.  - Basic Metabolic Panel    6. Hyperlipidemia, unspecified hyperlipidemia type  Nonfasting.  Will check total, HDL and LDL cholesterol levels today.  - HDL Cholesterol  - Cholesterol, Total  - LDL Cholesterol, Direct    7. JENI (generalized anxiety disorder)  JENI, stable.  Followed by Dr. Álvarez.  Continue home dose quetiapine, clonzepam and Remeron.   Scheduled follow-up April 30, 2019 with Dr. Álvarez.          Subjective:    Abdi Joseph is seen today for follow-up evaluation.  Type 1 diabetes.  Did review hospitalization secondary to DKA with markedly elevated blood glucose of 627 and anion gap of 25 with elevated beta hydroxybutyrate.  Likely secondary to viral gastroenteritis.  Required insulin drip.  Further dose  adjustments of insulin noted.  Working with MTM pharmacist currently.  Denies hypoglycemia or hyperglycemia concerns at this time.  Lantus insulin 26 units in the evening plus NovoLog sliding scale.  Had 5 units this morning with crackers and cheese.  Underlying hypertension treated with metoprolol tartrate 50 mg using 2 tablets twice daily plus lisinopril 5 mg daily.  Remains off thyroid medication with most recent TSH normal at 3.42 on December 12, 2018.  Simvastatin 20 mg at bedtime for lipid management.  Nonfasting.  Followed by Dr. Álvarez psychiatrist for JENI management.  History of restless leg syndrome.  Difficulties with swallowing feeling that food gets hung up lower chest near her epigastrium.  No bloating.  Denies constipation or diarrhea currently.  Comprehensive review of systems as above otherwise all negative.        Reviewed hospital discharge summary from March 2, 2019:  DKA: Patient presented with markedly elevated blood glucose of 627 with anion gap of 25, elevated betahydroxybutyrate. DKA is likely trigerred by acute viral gastroenteritis. Patient was admitted into MICU for insulin drip. After insulin drip, his DKA resolved and patient was transferred to general medical floor.     Type I diabetes: HbA1C was 8.3 on this admission. At home, patient takes Lantus 31 units at bedtime and premeal Novolog sliding scale. After his DKA resolved, the dose of Lantus was gradually increased when patient resumed his oral intake. Patient did well on Lantus 25 units. However, when Lantus was increased to 31 units, he developed hypoglycemia in the morning. Patient was also seen by the diabetes educator. He was recommended to do premeal insulin dosing based on carb count, in addition to his regular sliding scale. However, patient states that carb count seems too complicated and he does not feel comfortable doing it. During his hospital stay, based on carb count, patient needed 3-4 units of regular insulin. Patient  reports that due to his gastroparesis, he normally takes small meals and multiple frequent meals. Recommend patient to use regular insulin instead of Novolog. Patient would like to be discharged home before his insulin dose completely optimized. He was recommended to continue with Lantus 25 units QHS, premeal regular insulin 2 units for breakfast, 4 units for lunch and 4 units for dinner. He will continue to do sliding scale with regular insulin. Patient states that he will see his PCP within 3-5 days to further optimize his insulin doses.      ALBERTA on CKD: His creatinine was 5.69 on admission. Most likely due to dehydration as a result of DKA. It improved to 1.93 prior to discharge. His baseline is 2.1-2.4 for the past six months. Would recommend to recheck creatinine with PCP in one week.             Past Surgical History:   Procedure Laterality Date     ABDOMINAL SURGERY       CHOLECYSTECTOMY       FOOT AMPUTATION Right 11/3/2017    Procedure:  amputation of right hallux, Sesamoid and partial 1st metatarsal;  Surgeon: Lamin Barnett DPM;  Location: [facility];  Service:      KNEE ARTHROSCOPY      x3     refractory surgery       TYMPANOPLASTY W/ MASTOIDECTOMY  2012        Family History   Problem Relation Age of Onset     Heart disease Mother      Heart disease Father      Heart attack Father      Asthma Father      Diabetes Sister      Diabetes Brother      Heart attack Brother         Past Medical History:   Diagnosis Date     Anxiety      Arthritis      Cellulitis      Chronic Kidney Disease, Stage 3      Depression      Diabetes mellitus (H)      DKA (diabetic ketoacidoses) (H)      Gastroparesis      Hypertension      Hyponatremia      Hypothyroidism      MRSA (methicillin resistant Staphylococcus aureus)      Neuropathy (H)      Osteoarthritis Of The Knee      Panic disorder      PONV (postoperative nausea and vomiting)      Retinopathy due to secondary diabetes (H)      Thrombosis of superior  "sagittal sinus         Social History     Tobacco Use     Smoking status: Never Smoker     Smokeless tobacco: Never Used   Substance Use Topics     Alcohol use: Yes     Alcohol/week: 2.4 oz     Types: 4 Shots of liquor per week     Drug use: No        Current Outpatient Medications   Medication Sig Dispense Refill     acetaminophen (TYLENOL) 500 MG tablet Take 1-2 tablets (500-1,000 mg total) by mouth every 4 (four) hours as needed.  0     aspirin 81 MG EC tablet Take 81 mg by mouth daily.       blood glucose test strips Use 1 each As Directed as needed Dispense brand per patient's insurance at pharmacy discretion.. 200 strip 11     blood-glucose meter,continuous (DEXCOM G6 ) Misc Use as directed for continuous glucose monitoring 1 each 0     blood-glucose sensor (DEXCOM G6 SENSOR) Belia Use as directed for continuous glucose monitoring. Change every 10 days. 1 Device 13     blood-glucose transmitter (DEXCOM G6 TRANSMITTER) Belia Use as directed for continuous glucose monitoring. Change every 4 months. 1 Device 4     calcium, as carbonate, (TUMS) 200 mg calcium (500 mg) chewable tablet Chew 1 tablet 3 (three) times a day as needed for heartburn.       clonazePAM (KLONOPIN) 1 MG tablet Take 1 tablet (1 mg total) by mouth every morning. 4 tablet 0     clonazePAM (KLONOPIN) 2 MG tablet Take 2 mg by mouth at bedtime.       docusate sodium (COLACE) 100 MG capsule Take 100 mg by mouth 2 (two) times a day.       gabapentin (NEURONTIN) 100 MG capsule Take 100 mg by mouth every morning.       gabapentin (NEURONTIN) 400 MG capsule Take 400 mg by mouth bedtime.       insulin syringe-needle U-100 (INSULIN SYRINGE) 1 mL 29 gauge x 1/2\" Syrg Use 1 each As Directed as needed (with insulin). 200 each 11     LANTUS U-100 INSULIN 100 unit/mL injection Inject 25 Units under the skin at bedtime. 10 mL PRN     lisinopril (PRINIVIL,ZESTRIL) 5 MG tablet Take 1 tablet (5 mg total) by mouth daily. 90 tablet 3     methocarbamol " (ROBAXIN) 750 MG tablet Take 750 mg by mouth 3 (three) times a day as needed.        metoprolol tartrate (LOPRESSOR) 100 MG tablet Take 1 tablet (100 mg total) by mouth 2 (two) times a day. 180 tablet 1     mirtazapine (REMERON) 15 MG tablet Take 15 mg by mouth bedtime.        NOVOLIN R REGULAR U-100 INSULN 100 unit/mL injection Check blood sugar four (4) times daily.  10.65 Type 1 with hyperglycemia  No supplies needed  BD Ultra-fine 6 mm 31G 0.3 mL syringe - NDC [address]-73 - dispense 1 case, refill PRN for 1 year 10 mL PRN     omeprazole (PRILOSEC) 20 MG capsule Take 1 capsule (20 mg total) by mouth 2 (two) times a day before meals. 60 capsule 5     ondansetron (ZOFRAN) 4 MG tablet Take 4 mg by mouth every 8 (eight) hours as needed for nausea.        pramipexole (MIRAPEX) 0.25 MG tablet Take 1 tablet (0.25 mg total) by mouth at bedtime. 90 tablet 1     QUEtiapine (SEROQUEL) 300 MG tablet Take 600 mg by mouth bedtime.       QUEtiapine (SEROQUEL) 50 MG tablet Take 50 mg by mouth 2 (two) times a day Take with breakfast and lunch..             sildenafil, antihypertensive, (REVATIO) 20 mg tablet Take 2-3 tablets (40-60 mg total) by mouth daily as needed. (Patient taking differently: Take 50 mg by mouth daily as needed.       ) 30 tablet 2     silver sulfADIAZINE (SILVADENE, SSD) 1 % cream Apply 1 application topically 2 (two) times a day.       simvastatin (ZOCOR) 20 MG tablet Take 1 tablet (20 mg total) by mouth at bedtime. At bedtime 90 tablet 3     traZODone (DESYREL) 50 MG tablet Take 1 tablet (50 mg total) by mouth at bedtime. 30 tablet 0     No current facility-administered medications for this visit.      Facility-Administered Medications Ordered in Other Visits   Medication Dose Route Frequency Provider Last Rate Last Dose     bupivacaine-epinephrine (PF) 0.75 %-1:200,000 1.8 mL, EPINEPHrine 0.01 mL, fentaNYL pf 50 mcg/mL 0.5 mL    Continuous PRN WILLIAM Starkey MBBS   2.31 mL at 09/09/14 0709           Objective:    Vitals:    03/28/19 1022   BP: 120/60   Pulse: 75   SpO2: 98%   Weight: 195 lb (88.5 kg)      Body mass index is 28.8 kg/m .    Alert.  Quiet affect.  Baseline.  Chest clear.  Cardiac exam regular.  Extremities warm and dry.      This note has been dictated using voice recognition software and as a result may contain minor grammatical errors and unintended word substitutions.

## 2021-05-28 ASSESSMENT — ANXIETY QUESTIONNAIRES
GAD7 TOTAL SCORE: 10
GAD7 TOTAL SCORE: 11
GAD7 TOTAL SCORE: 11

## 2021-05-28 NOTE — TELEPHONE ENCOUNTER
Medication Question or Clarification  Who is calling: Patient  What medication are you calling about ?: Pantoprozol  What dose do you take ?:    How often are you taking the medication ?:   Who prescribed the medication ?  What is your question/concern?: RX needs to go to [facility] not [facility]  Please re-send RX to [facility]  [place], [place]  Pharmacy: [facility] [address] [place] [address] [address] AT [place] [address]  [phone]  Okay to leave a detailed message?: Yes  Site CMT - Please call the pharmacy to obtain any additional needed information.

## 2021-05-28 NOTE — TELEPHONE ENCOUNTER
Please resend to [facility] per request.   I apologize as I didn't see the patient wanted these prescriptions sent to two different pharmacies.

## 2021-05-28 NOTE — TELEPHONE ENCOUNTER
Who is calling:   Patient  Reason for Call:  Patient would like a call back in regards to the Dexcom 5   Date of last appointment with primary care: 3/28/2019  Okay to leave a detailed message: Yes

## 2021-05-29 ENCOUNTER — RECORDS - HEALTHEAST (OUTPATIENT)
Dept: ADMINISTRATIVE | Facility: CLINIC | Age: 56
End: 2021-05-29

## 2021-05-29 NOTE — TELEPHONE ENCOUNTER
Medication Request  Medication name: Ondanestron 4 mg tablet  Pharmacy Name and Location: [facility]  Reason for request: refill request, medication is listed as historical  When did you use medication last?:  Last fill 6.21.2019  Patient offered appointment:  n/a  Okay to leave a detailed message: yes

## 2021-05-29 NOTE — TELEPHONE ENCOUNTER
Refill Approved    Rx renewed per Medication Renewal Policy. Medication was last renewed on 12/17/18    Heidi Sterling, [facility] Triage/Med Refill 6/23/2019     Requested Prescriptions   Pending Prescriptions Disp Refills     ACCU-CHEK JILLIAN PLUS TEST STRP strips [Pharmacy Med Name: ACCU-CHEK JILLIAN PLUS   Strip] 300 strip 3     Sig: USE AS NEEDED       Diabetic Supplies Refill Protocol Passed - 6/21/2019  9:25 AM        Passed - Visit with PCP or prescribing provider visit in last 6 months     Last office visit with prescriber/PCP: 3/28/2019 Fernando Hannon MD OR same dept: 3/28/2019 Fernando Hannon MD OR same specialty: 3/28/2019 Fernando Hannon MD  Last physical: Visit date not found Last MTM visit: Visit date not found   Next visit within 3 mo: Visit date not found  Next physical within 3 mo: Visit date not found  Prescriber OR PCP: Fernando Hannon MD  Last diagnosis associated with med order: There are no diagnoses linked to this encounter.  If protocol passes may refill for 12 months if within 3 months of last provider visit (or a total of 15 months).             Passed - A1C in last 6 months     Hemoglobin A1c   Date Value Ref Range Status   03/28/2019 7.4 (H) 3.5 - 6.0 % Final

## 2021-05-29 NOTE — TELEPHONE ENCOUNTER
Who is calling:  Patient  Reason for Call:  Patient stated he would like the generic, sildenafil, and not Viagra.  Date of last appointment with primary care: 3/28/19  Okay to leave a detailed message: Yes  [phone]

## 2021-05-29 NOTE — TELEPHONE ENCOUNTER
Refill sent to pharmacy for Mirapex 0.25 mg use 2 tablets (0.5 mg maximum recommended dose for restless leg syndrome) take 2-3 hours before bedtime.  If worsening or not improving, then patient to see neurologist for further treatment options.  Please let me know if you would like me to place a referral for him to see neurologist.

## 2021-05-29 NOTE — TELEPHONE ENCOUNTER
Medication Request  Medication name: ondansetron HCL 4 mg tab  Pharmacy Name and Location: [facility]  Reason for request:historic order   When did you use medication last?:  unknown  Patient offered appointment:  fax  Okay to leave a detailed message: no [phone]

## 2021-05-29 NOTE — TELEPHONE ENCOUNTER
Medication Question or Clarification  Who is calling: Patient  What medication are you calling about? (include dose and sig) Viagra 100 mg tabs   Who prescribed the medication?: NA  What is your question/concern?:Patient has a coupon to get Viagra 100 mg tabs #30 for 24 dollars versus 900 dollars.  Please advise.  [facility]  Okay to leave a detailed message?: Yes [phone]    Site CMT - Please call the pharmacy to obtain any additional needed information.

## 2021-05-29 NOTE — TELEPHONE ENCOUNTER
Reason contacted:  Medication question  Information relayed:  Below message. Rx phoned into [facility] in [place] per patient request  Additional questions:  No  Further follow-up needed:  No  Okay to leave a detailed message:  No

## 2021-05-29 NOTE — TELEPHONE ENCOUNTER
Medication Request  Medication name: pramipexole (MIRAPEX) 0.25 MG tablet 3 tablets per night  [facility] and Location[id number] - [place], [place]   Reason for request: The patient states that below prescription is not enough medication. The patient is requesting to take 3 tablets per day. The patient states that his leg are running approximately 70% at night and he is having a hard time falling asleep. The patient was due to nocturia and is able to fall back asleep without his restless legs bothering him.     pramipexole (MIRAPEX) 0.25 MG tablet 90 tablet 1 3/7/2019     Sig - Route: Take 1 tablet (0.25 mg total) by mouth at bedtime. - Oral    Sent to pharmacy as: pramipexole (MIRAPEX) 0.25 MG tablet    Notes to Pharmacy: Dose decrease    E-Prescribing Status: Receipt confirmed by pharmacy (3/7/2019 12:01 PM CST)      When did you use medication last?:  6/23/2019  Patient offered appointment:  patient declined  Okay to leave a detailed message: yes

## 2021-05-30 ENCOUNTER — RECORDS - HEALTHEAST (OUTPATIENT)
Dept: ADMINISTRATIVE | Facility: CLINIC | Age: 56
End: 2021-05-30

## 2021-05-30 NOTE — TELEPHONE ENCOUNTER
Who is calling:  patient  Reason for Call:  Asking for Clara to please call him. Patient called [facility] and was told his insurance will cover the Freestyle Aye. Would like to discuss this further Clara. Please advise!  Date of last appointment with primary care: 3/28/19  Okay to leave a detailed message: Yes

## 2021-05-30 NOTE — TELEPHONE ENCOUNTER
Medication Question or Clarification  Who is calling: Patient  What medication are you calling about? (include dose and sig)    Disp Refills Start End    pramipexole (MIRAPEX) 0.25 MG tablet 180 tablet 3 6/24/2019     Sig - Route: Take 2 tablets (0.5 mg total) by mouth at bedtime. - Oral    Sent to pharmacy as: pramipexole (MIRAPEX) 0.25 MG tablet      Who prescribed the medication?: Fernando Hannon MD  What is your question/concern?: Patient is calling asking if the above medication can be sent to [facility], patient stated he contacted [facility] and was told they did not have a record of this prescription.    Patient is requesting a small quantity be sent to his local pharmacy because he does not have enough on hand to last him until he receives his order in the mail.    Pharmacy:   1. Resend above Rx to [facility]  2. Small quantity be sent to [facility] ([place]  Okay to leave a detailed message?: Yes  [phone]  Site CMT - Please call the pharmacy to obtain any additional needed information.

## 2021-05-30 NOTE — PROGRESS NOTES
"Assessment/Plan:    1. Type 1 Diabetes Mellitus  Type 1 diabetes, stable.  A1c of 7.5%.  Occasional hypoglycemic symptoms described.  Adjusting insulin on his own.  Will resume 25 units instead of 26 units due to increase insulin reactions described at 26 units compared to 25 units.  Diabetes education for insulin adjustment was recommended and referral was placed.  - Glycosylated Hemoglobin A1c  - Basic Metabolic Panel    2. Essential hypertension, benign  Hypertension is stable.  Continued use of lisinopril 5 mg daily, metoprolol tartrate 100 mg twice daily with occasional 50 mg twice daily additional dosing per patient.  - Basic Metabolic Panel    3. Chronic Kidney Disease, Stage 3  Basic metabolic panel to ensure stable renal function.  - Basic Metabolic Panel    4. Hyperlipidemia, unspecified hyperlipidemia type  History of hyperlipidemia.  Continue simvastatin 20 mg at bedtime.  Nonfasting today.  We will check at scheduled physical in 4 months.    Patient pursuing corneal transplant and will schedule for preoperative clearance as indicated.    JENI 7 questionnaire 12 out of 21 with PHQ 9 questionnaire 12 out of 27.        Subjective:    Abdi Joseph is seen today for follow-up evaluation.  Type 1 diabetes as previously noted.  Using Lantus insulin 25 units daily and had increased to 26 units however increase insulin reactions.  States blood pressure sometimes suboptimal control and add additional 50 mg twice daily with current dosing metoprolol tartrate 100 mg twice daily with continuation of lisinopril 5 mg daily.  Utilizes simvastatin 20 mg at bedtime.  Considering corneal transplant apparently.  Use of Mirapex 0.25 mg using 2 tablets at bedtime with additional benefit for restless leg syndrome management.  Denies recent illness.  Comprehensive review of systems as above otherwise all negative.     - Grecia  Tobacco: no  EtOH: Bacardi Diet Coke a couple times per week with his wife (\"not a big " "drinker\")  Mom - alive 89  Dad -   Siblings: Juan Domínguez  Surgeries: right great toe amputation  Hospitalizations: osteomyelitis; \"lots of staph and MRSA infections\"; right TKA  Work: disability    Dr. Álvarez - psychiatrist  Dr. Jesus Dos Santos - podiatrist  Dr. Menezes - internal medicine (followed x 30 years)    Past Surgical History:   Procedure Laterality Date     ABDOMINAL SURGERY       CHOLECYSTECTOMY       FOOT AMPUTATION Right 11/3/2017    Procedure:  amputation of right hallux, Sesamoid and partial 1st metatarsal;  Surgeon: Lamin Barnett DPM;  Location: [facility] OR;  Service:      KNEE ARTHROSCOPY      x3     refractory surgery       TYMPANOPLASTY W/ MASTOIDECTOMY          Family History   Problem Relation Age of Onset     Heart disease Mother      Heart disease Father      Heart attack Father      Asthma Father      Diabetes Sister      Diabetes Brother      Heart attack Brother         Past Medical History:   Diagnosis Date     Anxiety      Arthritis      Cellulitis      Chronic Kidney Disease, Stage 3      Depression      Diabetes mellitus (H)      DKA (diabetic ketoacidoses) (H)      Gastroparesis      Hypertension      Hyponatremia      Hypothyroidism      MRSA (methicillin resistant Staphylococcus aureus)      Neuropathy      Osteoarthritis Of The Knee      Panic disorder      PONV (postoperative nausea and vomiting)      Retinopathy due to secondary diabetes (H)      Thrombosis of superior sagittal sinus         Social History     Tobacco Use     Smoking status: Never Smoker     Smokeless tobacco: Never Used   Substance Use Topics     Alcohol use: Yes     Alcohol/week: 2.4 oz     Types: 4 Shots of liquor per week     Drug use: No        Current Outpatient Medications   Medication Sig Dispense Refill     ACCU-CHEK JILLIAN PLUS TEST STRP strips USE AS NEEDED 300 strip 3     acetaminophen (TYLENOL) 500 MG tablet Take 1-2 tablets (500-1,000 mg total) by mouth every 4 (four) hours as " "needed.  0     aspirin 81 MG EC tablet Take 81 mg by mouth daily.       blood-glucose meter,continuous (DEXCOM G6 ) Misc Use as directed for continuous glucose monitoring 1 each 0     blood-glucose sensor (DEXCOM G6 SENSOR) Belia Use as directed for continuous glucose monitoring. Change every 10 days. 1 Device 13     blood-glucose transmitter (DEXCOM G6 TRANSMITTER) Belia Use as directed for continuous glucose monitoring. Change every 4 months. 1 Device 4     calcium, as carbonate, (TUMS) 200 mg calcium (500 mg) chewable tablet Chew 1 tablet 3 (three) times a day as needed for heartburn.       clonazePAM (KLONOPIN) 1 MG tablet Take 1 tablet (1 mg total) by mouth every morning. 4 tablet 0     clonazePAM (KLONOPIN) 2 MG tablet Take 2 mg by mouth at bedtime.       docusate sodium (COLACE) 100 MG capsule Take 100 mg by mouth 2 (two) times a day.       flash glucose scanning reader (FREESTYLE QING 14 DAY READER) Misc Use 1 each As Directed continuous as needed. 1 each 0     flash glucose sensor (FREESTYLE QING 14 DAY SENSOR) Kit Use 1 each As Directed continuous as needed. Change every 14 days 2 kit 2     gabapentin (NEURONTIN) 100 MG capsule Take 100 mg by mouth every morning.       gabapentin (NEURONTIN) 400 MG capsule Take 400 mg by mouth bedtime.       insulin syringe-needle U-100 (INSULIN SYRINGE) 1 mL 29 gauge x 1/2\" Syrg Use 1 each As Directed as needed (with insulin). 200 each 11     LANTUS U-100 INSULIN 100 unit/mL injection Inject 25 Units under the skin at bedtime. 10 mL PRN     lisinopril (PRINIVIL,ZESTRIL) 5 MG tablet Take 1 tablet (5 mg total) by mouth daily. 90 tablet 3     methocarbamol (ROBAXIN) 750 MG tablet Take 750 mg by mouth 3 (three) times a day as needed.        metoprolol tartrate (LOPRESSOR) 100 MG tablet Take 1 tablet (100 mg total) by mouth 2 (two) times a day. 180 tablet 1     mirtazapine (REMERON) 15 MG tablet Take 15 mg by mouth bedtime.        NOVOLIN R REGULAR U-100 INSULN 100 " unit/mL injection Check blood sugar four (4) times daily.  10.65 Type 1 with hyperglycemia  No supplies needed  BD Ultra-fine 6 mm 31G 0.3 mL syringe - NDC [address]-78 - dispense 1 case, refill PRN for 1 year 10 mL PRN     omeprazole (PRILOSEC) 20 MG capsule Take 1 capsule (20 mg total) by mouth 2 (two) times a day before meals. 60 capsule 5     ondansetron (ZOFRAN) 4 MG tablet Take 1 tablet (4 mg total) by mouth every 8 (eight) hours as needed for nausea. 20 tablet 3     pantoprazole (PROTONIX) 40 MG tablet Take 1 tablet (40 mg total) by mouth 2 (two) times a day. 60 tablet 5     pramipexole (MIRAPEX) 0.25 MG tablet Take 2 tablets (0.5 mg total) by mouth at bedtime. 180 tablet 3     QUEtiapine (SEROQUEL) 300 MG tablet Take 600 mg by mouth bedtime.       QUEtiapine (SEROQUEL) 50 MG tablet Take 50 mg by mouth 2 (two) times a day Take with breakfast and lunch..             sildenafil (VIAGRA) 100 MG tablet Take 1 tablet (100 mg total) by mouth as needed for erectile dysfunction. 30 tablet 2     silver sulfADIAZINE (SILVADENE, SSD) 1 % cream Apply 1 application topically 2 (two) times a day.       simvastatin (ZOCOR) 20 MG tablet Take 1 tablet (20 mg total) by mouth at bedtime. At bedtime 90 tablet 3     traZODone (DESYREL) 50 MG tablet Take 1 tablet (50 mg total) by mouth at bedtime. 30 tablet 0     sildenafil (VIAGRA) 50 MG tablet Take 1 tablet (50 mg total) by mouth as needed for erectile dysfunction. Max 100 mg/ 24 hour. 20 tablet 1     sildenafil, antihypertensive, (REVATIO) 20 mg tablet Take 2-3 tablets (40-60 mg total) by mouth daily as needed. (Patient taking differently: Take 50 mg by mouth daily as needed.       ) 30 tablet 2     No current facility-administered medications for this visit.      Facility-Administered Medications Ordered in Other Visits   Medication Dose Route Frequency Provider Last Rate Last Dose     bupivacaine-epinephrine (PF) 0.75 %-1:200,000 1.8 mL, EPINEPHrine 0.01 mL, fentaNYL pf 50  mcg/mL 0.5 mL    Continuous PRN WILLIAM Starkey MBBS   2.31 mL at 09/09/14 0728          Objective:    Vitals:    07/17/19 1348   BP: 122/70   Pulse: 60   SpO2: 98%   Weight: 196 lb 11.2 oz (89.2 kg)      Body mass index is 29.05 kg/m .    Alert.  No apparent distress.  Trace peripheral edema bilateral, symmetric without rash.  No significant psychomotor agitation.      This note has been dictated using voice recognition software and as a result may contain minor grammatical errors and unintended word substitutions.

## 2021-05-30 NOTE — TELEPHONE ENCOUNTER
FYI - Status Update  Who is Calling: Patient  Update: Patient states [facility] is requesting office visit notes be faxed, prior to moving forward with his glucometer prescription. Please fax office visit notes dated 07/17/2019 to: [phone]. Patient was not able to provide any additional details.  Okay to leave a detailed message?:  No return call needed

## 2021-05-31 VITALS — WEIGHT: 198.3 LBS | BODY MASS INDEX: 29.37 KG/M2 | HEIGHT: 69 IN

## 2021-05-31 NOTE — TELEPHONE ENCOUNTER
Who is calling:  Patient  Reason for Call:  Patient called and stated he was contacted by [employer] and told him the prior authorization for the Dexcome glucose monitor was denied because he only takes medication once per day for his diabetes.     Caller stated he takes Lantus at bedtime every night but also uses Novolin through out the day.      Caller would like the insurance company be called regarding this information so that the request can be processed with the right information.  Date of last appointment with primary care: 7/17/2019  Okay to leave a detailed message: Yes

## 2021-05-31 NOTE — TELEPHONE ENCOUNTER
Patient Returning Call  Reason for call:  The patient is returning the call.  Information relayed to patient:  Below message has been relayed to the patient.   Patient has additional questions:  No  If YES, what are your questions/concerns:  NA  Okay to leave a detailed message?: No call back needed

## 2021-05-31 NOTE — TELEPHONE ENCOUNTER
Clara are you able to help with this?   If the PA was denied does an appeal need to be done?     I again am unfamiliar with continuous glucose monitors and guidelines for coverage etc.

## 2021-05-31 NOTE — TELEPHONE ENCOUNTER
Please notify Abdi I spoke with Clara and she is going to resend the Dexom to [facility] so this can be billed through Medicare Part B.

## 2021-05-31 NOTE — TELEPHONE ENCOUNTER
Who is calling:  Patient   Reason for Call: Questioning if the clinic staff was able to find out any information in regards to his order for a Dexcome Glucose Monitor. Please advise, and reach out to the patient.  Date of last appointment with primary care: 07/17/2019  Okay to leave a detailed message: Yes

## 2021-05-31 NOTE — TELEPHONE ENCOUNTER
I spoke with pt in regards to the message. He did verbally approve for us to send most recent OV to [facility] at: [phone].  I am faxing the requested items now.

## 2021-05-31 NOTE — TELEPHONE ENCOUNTER
Who is calling:  Laura with [facility]  Reason for Call:  States that they have received the order to dispense the patients DEXCOM G5 supplies.    They need the patients clinical notes from most current OF in order to [facility] for the clinical authorization    Please print and fax to [facility]  Fax: [phone]    Date of last appointment with primary care: 7/17/2019    Okay to leave a detailed message: No

## 2021-05-31 NOTE — TELEPHONE ENCOUNTER
Lois is calling stating there is a denial on this request.  IT is due to provider not responding to a peer to peer call and that patient only uses insulin once daily.  Writer shared there is not a notice in chart where a peer to peer was denied.  She is going to start PA process over.    Prior Authorization Request  Who s requesting:  Lois  [facility]  Medication Name: Dex Con 5 monitor  Insurance Plan: [employer] Medicare  Insurance Member ID Number:  unknown  Informed patient that prior authorizations can take up to 10 business days for response:   No  Okay to leave a detailed message: No [phone]

## 2021-05-31 NOTE — TELEPHONE ENCOUNTER
Who is calling:  Patient   Reason for Call: Patient requesting provider reach out to [phone], and provide an override due to patient testing his blood sugar 8 times daily. Please advise, and reach out to patient with any additional questions.    Date of last appointment with primary care: 7/17/19  Okay to leave a detailed message: No

## 2021-05-31 NOTE — TELEPHONE ENCOUNTER
Refill Approved    Rx renewed per Medication Renewal Policy. Medication was last renewed on 6/23/2019     Robert Mazariegos, [facility] Triage/Med Refill 8/7/2019     Requested Prescriptions   Pending Prescriptions Disp Refills     ACCU-CHEK JILLIAN PLUS TEST STRP strips [Pharmacy Med Name: ACCU-CHEK JILLIAN PLUS STRIPS 100'S] 800 strip 0     Sig: TEST 8 TIMES DAILY       Diabetic Supplies Refill Protocol Passed - 8/6/2019 11:06 AM        Passed - Visit with PCP or prescribing provider visit in last 6 months     Last office visit with prescriber/PCP: 7/17/2019 Fernando Hannon MD OR same dept: 7/17/2019 Fernando Hannon MD OR same specialty: 7/17/2019 Fernando Hannon MD  Last physical: Visit date not found Last MTM visit: Visit date not found   Next visit within 3 mo: Visit date not found  Next physical within 3 mo: Visit date not found  Prescriber OR PCP: Fernando Hannon MD  Last diagnosis associated with med order: 1. 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             Passed - A1C in last 6 months     Hemoglobin A1c   Date Value Ref Range Status   07/17/2019 7.5 (H) 3.5 - 6.0 % Final

## 2021-05-31 NOTE — TELEPHONE ENCOUNTER
This will be going thru patient's [employer] Supplement (Part B)  The PA team does not handle part B claims as those are billed as DME thru the patient's medical side of benefits.  This will have to be completed by the clinic staff.

## 2021-05-31 NOTE — TELEPHONE ENCOUNTER
Medication Question or Clarification  Who is calling: Patient  What medication are you calling about? (include dose and sig)   TEST STRP strips 800 strip 0 8/7/2019     Sig: TEST 8 TIMES DAILY    Sent to pharmacy as: ACCU-CHEK JILLIAN PLUS TEST STRP strips    E-Prescribing Status: Receipt confirmed by pharmacy (8/7/2019 12:48 PM CDT)        Who prescribed the medication?: Fernando Hannon MD    What is your question/concern?: Patient needs this prescription sent to [facility] as the charge for [facility] is astronomicle  [facility]  Okay to leave a detailed message?: Yes  Site CMT - Please call the pharmacy to obtain any additional needed information.    Urgent patient will run out of test strips and [facility] needs prescription with current test 8xdaily

## 2021-05-31 NOTE — TELEPHONE ENCOUNTER
Medication Question or Clarification  Who is calling: Patient  What medication are you calling about? (include dose and sig)   Disp Refills Start End     blood-glucose transmitter (DEXCOM G6 TRANSMITTER) Belia 1 Device 4 3/7/2019     Sig: Use as directed for continuous glucose monitoring. Change every 4 months.    Sent to pharmacy as: blood-glucose transmitter (DEXCOM G6 TRANSMITTER) Belia    E-Prescribing Status: Receipt confirmed by pharmacy (3/7/2019 12:01 PM CST)      Who prescribed the medication?: Fernando Hannon MD   What is your question/concern?: Patient stated he would like the Dexcome 6 glucometer sent to [facility]. Patient stated [facility] needs an Rx for the glucometer and supplies as well as documentation on why. Patient only had access to their phone number. Please call them to find out what is needed and to get their fax number.  Pharmacy: [facility] phone number [phone]  Okay to leave a detailed message?: Yes  [phone]  Site CMT - Please call the pharmacy to obtain any additional needed information.

## 2021-05-31 NOTE — TELEPHONE ENCOUNTER
Called [employer] and this is not covered for the patient.     Reason contacted:  continuous glucose monitor   Information relayed:  Patient notified I called his insurance and they will not cover the continuous glucose monitor even with the PA that was done.   Additional questions:  No  Further follow-up needed:  No  Okay to leave a detailed message:  No

## 2021-05-31 NOTE — TELEPHONE ENCOUNTER
Orders being requested: Accu-Chek Marina Plus Glucometer   Reason service is needed/diagnosis: Patient's glucometer is broken.  When are orders needed by: As Able  Where to send Orders: Please fax orders to [facility] in [place].  Okay to leave detailed message?  Yes

## 2021-06-01 NOTE — TELEPHONE ENCOUNTER
Medication Question or Clarification  Who is calling: Patient  What medication are you calling about? (include dose and sig) Lantus U-100 insulin, 25 units under the skin at bedtime.  Who prescribed the medication?: Beatris Kendall  What is your question/concern?: 1)  Why were vials sent vs Lantus Solo Star?  He does not use the vials.  He had to take the insulin from the pharmacy as he was out and needed to take his medication.  Patient now needs syringes and he prefers 29 gauge, short needle.  2)  Patient prefers to use the Lantus Solo Star and would like this sent to his pharmacy which is [facility] #[id number] for when he needs to fill Lantus again.  Pharmacy: [id number]  Okay to leave a detailed message?: Yes  Site CMT - Please call the pharmacy to obtain any additional needed information.

## 2021-06-01 NOTE — TELEPHONE ENCOUNTER
Medication Question or Clarification  Who is calling: Patient  What medication are you calling about? (include dose and sig)  metoprolol tartrate (LOPRESSOR) 100 MG tablet 180 tablet 3 9/16/2019     Sig: TAKE 1 TABLET TWICE DAILY (CHANGE IN TABLET STRENGTH)    Sent to pharmacy as: metoprolol tartrate (LOPRESSOR) 100 MG tablet          Who prescribed the medication?: Dr Hannon  What is your question/concern?: Patient states he needs a 10 day fill to go to the [facility] due to it is a early refill for his mail order pharmacy.  States he is taking 150mg twice a day of this medication.    Progress notes from 7/17/2019   2. Essential hypertension, benign  Hypertension is stable.  Continued use of lisinopril 5 mg daily, metoprolol tartrate 100 mg twice daily with occasional 50 mg twice daily additional dosing per patient.    Pharmacy: [place] [id number]  Okay to leave a detailed message?: Yes  Site CMT - Please call the pharmacy to obtain any additional needed information.

## 2021-06-01 NOTE — TELEPHONE ENCOUNTER
Refill Request  Did you contact pharmacy: No  Medication name:   Requested Prescriptions     Pending Prescriptions Disp Refills     sildenafil (VIAGRA) 100 MG tablet 30 tablet 2     Sig: Take 1 tablet (100 mg total) by mouth as needed for erectile dysfunction.     Who prescribed the medication:   TOY SIBLEY  Pharmacy Name and Location:   [id number]  Is patient out of medication: No.  1 days left  Patient notified refills processed in 72 hours:  yes  Okay to leave a detailed message: yes    Please let patient know if he needs to  paper copy of medication.

## 2021-06-01 NOTE — PROGRESS NOTES
Assessment: Abdi is here with his wife, Grecia, today to get started on his new Dexcom system.  He received the G5 a couple weeks ago.  He did watch some videos on how it works and how to put the sensor on.  We reviewed the transmitter,  and sensors, how each works and replacing the sensor weekly.  We set-up his low alert at 80 and his high alert at 270.  He did not bring his phone with him today, reviewed apps to download and what each does.  We tried to start a sensor today, but he had some trouble with the adhesive.  He did not bring another sensor, so we were unable to start one today.    Plan: Will meet on Thursday to start sensor and ensure apps are working.    Subjective and Objective:      Abdi Joseph is referred by Dr. Fernando Hannon for Diabetes Education.     Lab Results   Component Value Date    HGBA1C 7.5 (H) 07/17/2019     Follow up:   CDE (certified diabetic educator)      Education:     Monitoring   Meter (per above goals): Assessed and Discussed  Monitoring: Assessed, Discussed and Literature provided  BG goals: Assessed and Discussed    Nutrition Management  Medications: Assessed and Discussed  Orals: Assessed  Injected Medications: Assessed and Discussed   Storage/Exp:Assessed and Discussed   Site Rotation: Assessed and Discussed   Sites Assessed: yes    Diabetes Disease Process: Assessed and Discussed    Acute Complications: Prevent, Detect, Treat:  Hypoglycemia: Assessed and Discussed  Hyperglycemia: Assessed and Discussed  Sick Days: Not addressed  Driving: Not addressed    Chronic Complications  Goal Setting and Problem Solving: Assessed and Discussed  Barriers: Assessed and Discussed  Psychosocial Adjustments: Assessed and Discussed      Time spent with the patient: 30 minutes for diabetes education and counseling.   Previous Education: yes  Visit Type:DSMT        Angeli Gaxiola  9/17/2019

## 2021-06-01 NOTE — TELEPHONE ENCOUNTER
Patient calling to tell Tiffanie LLANOS that he thinks he got his dexcom in ok today. He is going to see how today goes and if all goes well he will not need to come in tomorrow morning at 9:30a as discussed (?)     OKTLDM

## 2021-06-01 NOTE — PROGRESS NOTES
[facility]  ENDOCRINOLOGY    Diabetes Note 9/4/2019    Abdi Joseph, 1965, [id number]          Reason for visit      1. Type 1 diabetes mellitus with complication (H)    2. Type 1 diabetes mellitus with proliferative retinopathy, macular edema presence unspecified, unspecified laterality, unspecified proliferative retinopathy type (H)        HPI     Abdi Joseph is a very pleasant 54 y.o. old male who presents for follow up.  SUMMARY:  Abdi is here today for Type 1 DM.  He has finally, after much work, gotten a Dexcom, and he would like help with getting started.  He is using a Freestyle at present.  His current A1c is 7.5 and down considerably from 8.3. He attributes this to being able to see what his BG is at any given time. He has some chronic problems associated with IDDM including Neurpathy, Retinopathy, and CKD, Stage 3. He has JENI and is on Remeron and Seroquel, successfully. He is currently taking Lantus, 26 units daily in PM.  He takes 2-4 units of Regular Insulin with meals. He reports that he sometimes gets Novolog from his sister, but otherwise, cannot afford the Analog insulins.He takes the R before he eats, and will take the Analog insulin after he eats, if he has it.  He is having hypoglycemia 3-4 times a week. His Freestyle download shows an Ave BG of 169.  51% of the time her was above target and 38% of the time in target.  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 Neuropathy is well managed with Gabapentin currently. CKD is stable at present.       Blood glucose data:  Ave: 169    Past Medical History     Patient Active Problem List   Diagnosis     Acute Colitis     Dehydration (Na, H2O)     Diabetic Ulcer Of The Left Foot     Cellulitis     Cellulitis Of The Left Foot     Hypothyroidism, unspecified type     Hyponatremia     Anemia     JENI (generalized anxiety disorder)     Chronic Kidney Disease, Stage 3      "Osteoarthritis Of The Knee     Type 1 diabetes mellitus with retinopathy of both eyes, macular edema presence unspecified, unspecified retinopathy severity (H)     Pneumonia     Abdominal Pain     Intractable vomiting     Intractable vomiting     DKA (diabetic ketoacidoses) (H)     Diabetes (H)     Hyperkalemia     Hyponatremia     Hypopyon ulcer     Acute-on-chronic kidney injury (H)     MRSA infection     Hypomagnesemia     Hypertension     Thrombosis, superior sagittal sinus     Cellulitis of right foot     Hyperglycemia     Acute kidney injury (H)     Diabetic ketoacidosis without coma associated with type 1 diabetes mellitus (H)     Lactic acidosis     Foot ulcer, right, with unspecified severity (H)     Gastrointestinal hemorrhage, unspecified gastrointestinal hemorrhage type     Cellulitis     Osteomyelitis of right foot (H)     ALBERTA (acute kidney injury) (H)     Hyperlipidemia, unspecified hyperlipidemia type     Essential hypertension, benign     DKA, type 1, not at goal (H)     Acute gastroenteritis     Hospital discharge follow-up     Viral gastroenteritis        Family History       family history includes Asthma in his father; Diabetes in his brother and sister; Heart attack in his brother and father; Heart disease in his father and mother.    Social History      reports that he has never smoked. He has never used smokeless tobacco. He reports that he drinks about 2.4 oz of alcohol per week. He reports that he does not use drugs.      Review of Systems     Patient has no polyuria or polydipsia, no chest pain, dyspnea or TIA's, no numbness, tingling or pain in extremities  Remainder negative except as noted in HPI.    Vital Signs     /72 (Patient Site: Right Arm, Patient Position: Sitting, Cuff Size: Adult Regular)   Pulse 68   Ht 5' 9\" (1.753 m)   Wt 200 lb 1.6 oz (90.8 kg)   BMI 29.55 kg/m    Wt Readings from Last 3 Encounters:   09/03/19 200 lb 1.6 oz (90.8 kg)   08/20/19 203 lb 8 oz (92.3 " kg)   07/17/19 196 lb 11.2 oz (89.2 kg)       Physical Exam     Constitutional:  Well developed, Well nourished  HENT:  Normocephalic, edentulous on top  Neck: Thyroid normal, No lymph nodes, Supple  Eyes:  PERRL, Conjunctiva pink  Respiratory:  Normal breath sounds, No respiratory distress  Cardiovascular:  Normal heart rate, Normal rhythm, No murmurs  GI:  Bowel sounds normal, Soft, No tenderness  Musculoskeletal:  No gross deformity or lesions, excellent dorsalis pedis pulses  Skin: No acanthosis nigricans, lipoatrophy or lipodystrophy  Neurologic:  Alert & oriented x 3, nonfocal  Psychiatric:  Affect slightly flat, Mood, Insight appropriate  Diabetic foot exam: not done today.    Assessment     1. Type 1 diabetes mellitus with complication (H)    2. Type 1 diabetes mellitus with proliferative retinopathy, macular edema presence unspecified, unspecified laterality, unspecified proliferative retinopathy type (H)        Plan     Abdi is going to work on his hypoglycemia and not take extra insulin if his numbers are higher in the evening, in spite of snacking. This is when he seems to have trouble. No other changes to his insulin dosages. The Dexcom will help and I have put in a referral for him to work with CDE on this. We discussed referral to Nephrology and he wants to wait.  He will f/u with me in 6 months, sooner with problems or concerns. Time spent with pt today: 25 min with >50% spent in counseling and coordination of care.        Beatris FERRERA Endocrinology  9/4/2019  6:54 AM        Lab Results     Hemoglobin A1c   Date Value Ref Range Status   07/17/2019 7.5 (H) 3.5 - 6.0 % Final   03/28/2019 7.4 (H) 3.5 - 6.0 % Final   12/12/2018 8.3 (H) 3.5 - 6.0 % Final   11/02/2017 8.0 (H) 4.2 - 6.1 % Final   07/01/2017 7.6 (H) 4.2 - 6.1 % Final     Creatinine   Date Value Ref Range Status   07/17/2019 2.26 (H) 0.70 - 1.30 mg/dL Final   03/28/2019 1.97 (H) 0.70 - 1.30 mg/dL Final   03/06/2019 1.96 (H) 0.70 -  "1.30 mg/dL Final     Microalbumin, Random Urine   Date Value Ref Range Status   02/21/2019 2.41 (H) 0.00 - 1.99 mg/dL Final       Cholesterol   Date Value Ref Range Status   03/28/2019 111 <=199 mg/dL Final     HDL Cholesterol   Date Value Ref Range Status   03/28/2019 41 >=40 mg/dL Final       Lab Results   Component Value Date    ALT 17 02/26/2019    AST 12 02/26/2019    ALKPHOS 165 (H) 02/26/2019    BILITOT 0.6 02/26/2019         Current Medications     Outpatient Medications Prior to Visit   Medication Sig Dispense Refill     acetaminophen (TYLENOL) 500 MG tablet Take 1-2 tablets (500-1,000 mg total) by mouth every 4 (four) hours as needed.  0     aspirin 81 MG EC tablet Take 81 mg by mouth daily.       calcium, as carbonate, (TUMS) 200 mg calcium (500 mg) chewable tablet Chew 1 tablet 3 (three) times a day as needed for heartburn.       clonazePAM (KLONOPIN) 1 MG tablet Take 1 tablet (1 mg total) by mouth every morning. 4 tablet 0     clonazePAM (KLONOPIN) 2 MG tablet Take 2 mg by mouth at bedtime.       docusate sodium (COLACE) 100 MG capsule Take 100 mg by mouth 2 (two) times a day.       flash glucose scanning reader (FREESTYLE QING 14 DAY READER) Misc Use 1 each As Directed continuous as needed. 1 each 0     flash glucose sensor (FREESTYLE QING 14 DAY SENSOR) Kit Use 1 each As Directed continuous as needed. Change every 14 days 2 kit 2     gabapentin (NEURONTIN) 400 MG capsule Take 400 mg by mouth bedtime.       insulin syringe-needle U-100 (INSULIN SYRINGE) 1 mL 29 gauge x 1/2\" Syrg Use 1 each As Directed as needed (with insulin). 200 each 11     lisinopril (PRINIVIL,ZESTRIL) 5 MG tablet Take 1 tablet (5 mg total) by mouth daily. 90 tablet 3     methocarbamol (ROBAXIN) 750 MG tablet Take 750 mg by mouth 3 (three) times a day as needed.        metoprolol tartrate (LOPRESSOR) 100 MG tablet Take 1 tablet (100 mg total) by mouth 2 (two) times a day. 180 tablet 1     mirtazapine (REMERON) 15 MG tablet Take 15 " mg by mouth bedtime.        NOVOLIN R REGULAR U-100 INSULN 100 unit/mL injection Check blood sugar four (4) times daily.  10.65 Type 1 with hyperglycemia  No supplies needed  BD Ultra-fine 6 mm 31G 0.3 mL syringe - NDC [address]-66 - dispense 1 case, refill PRN for 1 year (Patient taking differently: Inject 2-4 Units under the skin 3 (three) times a day before meals. Check blood sugar four (4) times daily.  10.65 Type 1 with hyperglycemia  No supplies needed  BD Ultra-fine 6 mm 31G 0.3 mL syringe - NDC [address]-24 - dispense 1 case, refill PRN for 1 year      ) 10 mL PRN     ondansetron (ZOFRAN) 4 MG tablet Take 1 tablet (4 mg total) by mouth every 8 (eight) hours as needed for nausea. 20 tablet 3     pantoprazole (PROTONIX) 40 MG tablet Take 1 tablet (40 mg total) by mouth 2 (two) times a day. 60 tablet 5     pramipexole (MIRAPEX) 0.25 MG tablet Take 2 tablets (0.5 mg total) by mouth at bedtime. 28 tablet 0     QUEtiapine (SEROQUEL) 300 MG tablet Take 600 mg by mouth bedtime.       QUEtiapine (SEROQUEL) 50 MG tablet Take 50 mg by mouth 2 (two) times a day Take with breakfast and lunch..             sildenafil (VIAGRA) 100 MG tablet Take 1 tablet (100 mg total) by mouth as needed for erectile dysfunction. 30 tablet 2     silver sulfADIAZINE (SILVADENE, SSD) 1 % cream Apply 1 application topically 2 (two) times a day.       simvastatin (ZOCOR) 20 MG tablet Take 1 tablet (20 mg total) by mouth at bedtime. At bedtime 90 tablet 3     traZODone (DESYREL) 50 MG tablet Take 1 tablet (50 mg total) by mouth at bedtime. 30 tablet 0     blood glucose test (ACCU-CHEK JILLIAN PLUS TEST STRP) strips TEST 8 TIMES DAILY (Patient taking differently: TEST 1 TIMES DAILY AS NEEDED WHEN SIENNA      ) 800 strip 6     blood-glucose meter Misc Use 1 device a directed 8 time daily. 1 each 0     gabapentin (NEURONTIN) 100 MG capsule Take 100 mg by mouth every morning.       LANTUS U-100 INSULIN 100 unit/mL injection Inject 25 Units under the  skin at bedtime. (Patient taking differently: Inject 26 Units under the skin at bedtime.       ) 10 mL PRN     omeprazole (PRILOSEC) 20 MG capsule Take 1 capsule (20 mg total) by mouth 2 (two) times a day before meals. 60 capsule 5     Facility-Administered Medications Prior to Visit   Medication Dose Route Frequency Provider Last Rate Last Dose     bupivacaine-epinephrine (PF) 0.75 %-1:200,000 1.8 mL, EPINEPHrine 0.01 mL, fentaNYL pf 50 mcg/mL 0.5 mL    Continuous PRN WILLIAM Starkey MBBS   2.31 mL at 09/09/14 0782

## 2021-06-01 NOTE — TELEPHONE ENCOUNTER
FYI - Status Update  Who is Calling: Patient  Update: The patient is requesting an expedited refill due to being nearly out of the medication. The patient was reminded to please request refills three business days in advance prior to running out as a courtesy to the provider.  Okay to leave a detailed message?:  Yes

## 2021-06-01 NOTE — TELEPHONE ENCOUNTER
Spoke with Abdi.  Thinks he got his Dexcom sensor in okay.  If not will will come tomorrow quick.  If everything is okay, follow-up in 2-3 weeks.  He will call back if needed.

## 2021-06-01 NOTE — TELEPHONE ENCOUNTER
Refill Approved    Rx renewed per Medication Renewal Policy. Medication was last renewed on 3/7/19.    Irene Cadena, [facility] Triage/Med Refill 9/16/2019     Requested Prescriptions   Pending Prescriptions Disp Refills     metoprolol tartrate (LOPRESSOR) 100 MG tablet [Pharmacy Med Name: METOPROLOL TARTRATE 100 MG Tablet] 180 tablet 1     Sig: TAKE 1 TABLET TWICE DAILY (CHANGE IN TABLET STRENGTH)       Beta-Blockers Refill Protocol Passed - 9/16/2019  9:47 AM        Passed - PCP or prescribing provider visit in past 12 months or next 3 months     Last office visit with prescriber/PCP: 7/17/2019 Fernando Hannon MD OR same dept: 7/17/2019 Fernando Hannon MD OR same specialty: 7/17/2019 Fernando Hannon MD  Last physical: Visit date not found Last MTM visit: Visit date not found   Next visit within 3 mo: Visit date not found  Next physical within 3 mo: Visit date not found  Prescriber OR PCP: Fernando Hannon MD  Last diagnosis associated with med order: 1. Secondary hypertension  - metoprolol tartrate (LOPRESSOR) 100 MG tablet [Pharmacy Med Name: METOPROLOL TARTRATE 100 MG Tablet]; TAKE 1 TABLET TWICE DAILY (CHANGE IN TABLET STRENGTH)  Dispense: 180 tablet; Refill: 1    If protocol passes may refill for 12 months if within 3 months of last provider visit (or a total of 15 months).             Passed - Blood pressure filed in past 12 months     BP Readings from Last 1 Encounters:   09/03/19 100/72

## 2021-06-01 NOTE — TELEPHONE ENCOUNTER
Medication was filled by ENDO provider yesterday at same pharmacy.  Social[employer] message sent to patient.    Leni Vale RN, [facility] Nurse Triage/Med Refills RN

## 2021-06-02 VITALS — BODY MASS INDEX: 28.12 KG/M2 | WEIGHT: 190.4 LBS

## 2021-06-02 VITALS — WEIGHT: 191 LBS | BODY MASS INDEX: 28.95 KG/M2 | HEIGHT: 68 IN

## 2021-06-02 VITALS — WEIGHT: 198 LBS | BODY MASS INDEX: 30.33 KG/M2

## 2021-06-02 VITALS — BODY MASS INDEX: 28.3 KG/M2 | WEIGHT: 191.1 LBS | HEIGHT: 69 IN

## 2021-06-02 VITALS — WEIGHT: 195 LBS | BODY MASS INDEX: 28.8 KG/M2

## 2021-06-02 NOTE — TELEPHONE ENCOUNTER
RN triage  Patient states that he has felt ill in the past 4 days.  He noticed one night 4 days ago he felt chilled and needed to cover up. At this time he did vomit one time, and used the bathroom 4 times but states it was not diarrhea. He states he had a headache behind his eyes at this time as well, used a cold pack and it went away. He has had an upset stomach in the past day.  Currently his wife states that he does not feel warm at all.  Patient states that he uses the Dexcom 5 blood sugar meter and feels it is not working well.  Using his old meter. This morning blood sugar was 163, he ate a banana and used 7 units of Novolin. He stated he felt he needed more insulin, so he used 10 units of Lantus and 7 more units of Novolin. Currently his blood sugar is 126.  Patient denies any increased urination, states that has been normal. Drinking gatorade zero.  Does state that he has been napping more during the day and feels he is moving slower. No lightheaded or dizziness.  Gave disposition of home care. Reviewed hydration, monitoring blood sugar while ill, taking insulin as prescribed. Reviewed signs and symptoms of when to call back.  Patient stated understanding and had no further questions.   Heidi Sterling, RN  [facility] Triage Nurse  12:50 PM  10/14/2019        Reason for Disposition    Blood glucose > 240 mg/dl (13 mmol/l)    Protocols used: DIABETES - HIGH BLOOD SUGAR-A-OH

## 2021-06-02 NOTE — TELEPHONE ENCOUNTER
Refill Approved    Rx renewed per Medication Renewal Policy. Medication was last renewed on 10/18/19 #30 .    Melanie Hardy, [facility] Triage/Med Refill 10/18/2019     Requested Prescriptions   Pending Prescriptions Disp Refills     simvastatin (ZOCOR) 20 MG tablet [Pharmacy Med Name: SIMVASTATIN 20MG TABLETS] 90 tablet 5     Sig: TAKE 1 TABLET(20 MG) BY MOUTH AT BEDTIME       Statins Refill Protocol (Hmg CoA Reductase Inhibitors) Passed - 10/18/2019  1:45 PM        Passed - PCP or prescribing provider visit in past 12 months      Last office visit with prescriber/PCP: 7/17/2019 Fernando Hannon MD OR same dept: 7/17/2019 Fernando Hannon MD OR same specialty: 7/17/2019 Fernando Hannon MD  Last physical: Visit date not found Last MTM visit: Visit date not found   Next visit within 3 mo: Visit date not found  Next physical within 3 mo: Visit date not found  Prescriber OR PCP: Fernando Hannon MD  Last diagnosis associated with med order: 1. Hyperlipidemia, unspecified hyperlipidemia type  - simvastatin (ZOCOR) 20 MG tablet [Pharmacy Med Name: SIMVASTATIN 20MG TABLETS]; TAKE 1 TABLET(20 MG) BY MOUTH AT BEDTIME  Dispense: 90 tablet; Refill: 5    If protocol passes may refill for 12 months if within 3 months of last provider visit (or a total of 15 months).

## 2021-06-02 NOTE — TELEPHONE ENCOUNTER
Medication Question or Clarification  Who is calling: Patient  What medication are you calling about? (include dose and sig) Simvastatin 20 mg, one at bedtime  Who prescribed the medication?: Fernando Hannon MD   What is your question/concern?: Patient needs prescription of simvastatin 20 mg sent to local pharmacy and [facility].  Pharmacy: [id number] for 30 tablets and [facility] for 90 tablets and refills.   Okay to leave a detailed message?: Yes  Site CMT - Please call the pharmacy to obtain any additional needed information.

## 2021-06-02 NOTE — TELEPHONE ENCOUNTER
"Patient returned call. Advised per chart note \"DexCom cannot be filled at [facility] -- would he be ok with [facility]? They do the DexCom medical billing and they would deliver. DexCom needs to go through Part B billing and I dont think [facility] in [place] does that.\"  Patient would be willing to have the order filled through [facility]. Also understands it would still need the Prior Authorization. Please let him know if everything goes through for him for Dexcom 6.   Stacia PSR  "

## 2021-06-02 NOTE — TELEPHONE ENCOUNTER
DexCom cannot be filled at [facility] -- would he be ok with [facility]? They do the DexCom medical billing and they would deliver. DexCom needs to go through Part B billing and I dont think [facility] in [place] does that.     Let me know if he is ok with that!

## 2021-06-02 NOTE — TELEPHONE ENCOUNTER
rx pended for approval to go to local [facility] pharmacy  Once approved will set up rx to go to mail order pharmacy also.

## 2021-06-02 NOTE — TELEPHONE ENCOUNTER
Medication Question or Clarification  Who is calling: Patient  What medication are you calling about? (include dose and sig) Dexacom 5 is what I currently have.   Who prescribed the medication?: Fernando Hannon MD  What is your question/concern?: I was told this needs to be upgraded to a Dexacom 6 and a prior auth is needed for my insurance.  I also need this to be sent to the [facility] in [place].    Pharmacy: [facility]  Okay to leave a detailed message?: Yes  Site CMT - Please call the pharmacy to obtain any additional needed information.

## 2021-06-03 ENCOUNTER — OFFICE VISIT - HEALTHEAST (OUTPATIENT)
Dept: RHEUMATOLOGY | Facility: CLINIC | Age: 56
End: 2021-06-03

## 2021-06-03 VITALS
HEIGHT: 68 IN | OXYGEN SATURATION: 94 % | WEIGHT: 213 LBS | HEART RATE: 74 BPM | DIASTOLIC BLOOD PRESSURE: 70 MMHG | BODY MASS INDEX: 32.28 KG/M2 | SYSTOLIC BLOOD PRESSURE: 150 MMHG

## 2021-06-03 VITALS
SYSTOLIC BLOOD PRESSURE: 100 MMHG | WEIGHT: 200.1 LBS | BODY MASS INDEX: 29.64 KG/M2 | HEART RATE: 68 BPM | HEIGHT: 69 IN | DIASTOLIC BLOOD PRESSURE: 72 MMHG

## 2021-06-03 VITALS
OXYGEN SATURATION: 97 % | HEART RATE: 78 BPM | WEIGHT: 216 LBS | DIASTOLIC BLOOD PRESSURE: 64 MMHG | BODY MASS INDEX: 33.09 KG/M2 | SYSTOLIC BLOOD PRESSURE: 126 MMHG

## 2021-06-03 VITALS — BODY MASS INDEX: 29.83 KG/M2 | WEIGHT: 202 LBS

## 2021-06-03 VITALS — WEIGHT: 203.5 LBS | BODY MASS INDEX: 30.05 KG/M2

## 2021-06-03 VITALS — BODY MASS INDEX: 29.05 KG/M2 | WEIGHT: 196.7 LBS

## 2021-06-03 DIAGNOSIS — M06.09 SERONEGATIVE RHEUMATOID ARTHRITIS OF MULTIPLE SITES (H): ICD-10-CM

## 2021-06-03 DIAGNOSIS — N18.32 STAGE 3B CHRONIC KIDNEY DISEASE (H): ICD-10-CM

## 2021-06-03 DIAGNOSIS — R76.8 ANA POSITIVE: ICD-10-CM

## 2021-06-03 DIAGNOSIS — G56.03 BILATERAL CARPAL TUNNEL SYNDROME: ICD-10-CM

## 2021-06-03 NOTE — TELEPHONE ENCOUNTER
Refill Approved    Rx renewed per Medication Renewal Policy. Medication was last renewed on 4/19/19.    Denisha Porras, [facility] Triage/Med Refill 11/12/2019     Requested Prescriptions   Pending Prescriptions Disp Refills     pantoprazole (PROTONIX) 40 MG tablet [Pharmacy Med Name: PANTOPRAZOLE 40MG TABLETS] 60 tablet 0     Sig: TAKE 1 TABLET(40 MG) BY MOUTH TWICE DAILY       GI Medications Refill Protocol Passed - 11/12/2019  8:59 AM        Passed - PCP or prescribing provider visit in last 12 or next 3 months.     Last office visit with prescriber/PCP: 7/17/2019 Fernando Hannon MD OR same dept: 7/17/2019 Fernando Hannon MD OR same specialty: 7/17/2019 Fernando Hannon MD  Last physical: Visit date not found Last MTM visit: Visit date not found   Next visit within 3 mo: Visit date not found  Next physical within 3 mo: Visit date not found  Prescriber OR PCP: Fernando Hannon MD  Last diagnosis associated with med order: 1. Gastrointestinal hemorrhage, unspecified gastrointestinal hemorrhage type  - pantoprazole (PROTONIX) 40 MG tablet [Pharmacy Med Name: PANTOPRAZOLE 40MG TABLETS]; TAKE 1 TABLET(40 MG) BY MOUTH TWICE DAILY  Dispense: 60 tablet; Refill: 0    If protocol passes may refill for 12 months if within 3 months of last provider visit (or a total of 15 months).

## 2021-06-03 NOTE — TELEPHONE ENCOUNTER
FYI - Status Update  Who is Calling: Patient  Update: as of 11/1/19 Patients insurance approved blood-glucose meter,continuous (DEXCOM G6 ) Misc..   Okay to leave a detailed message?:  Yes

## 2021-06-03 NOTE — TELEPHONE ENCOUNTER
FYI - Status Update  Who is Calling: Francoise from [facility]  Update: Caller stated she spoke to [employer] and was told the patient could get the Dexcom continuous glucose meter either at a discounted price if it was sent to the clinic or if it came from [facility] themselves. Caller stated if the patient gets it from a retail pharmacy, it will be more expensive for the patient. Caller stated it would cost him about $1000 at the retail pharmacy.  Okay to leave a detailed message?:  No return call needed

## 2021-06-03 NOTE — PROGRESS NOTES
Assessment and Plan:       1. Encounter for general adult medical examination with abnormal findings  Annual wellness visit completed.  Immunizations reviewed and up-to-date with patient stating that he received tetanus shot 5 years ago prior to knee surgery as well as recently receiving seasonal flu shot through [facility] pharmacy.  Has not completed colonoscopy however will complete Cologuard which she has available at home.  PSA and hepatitis C screen based on age criteria.  Annual wellness visits to continue.  Risks associated with described fair health as well as history of depression.  Needs to complete advanced directives.  - Hepatitis C Antibody (Anti-HCV)  - PSA (Prostatic-Specific Antigen), Annual Screen    2. Type 1 Diabetes Mellitus  Type 1 diabetes with improvement from 7.5% down to 6.7% however unfortunately associated with occasional hypoglycemic episode.  Patient utilizing Lantus insulin 27 units daily plus Novolin sliding scale with meals.  I have instructed patient to continue to follow blood sugars closely with decreasing metoprolol as noted below in order to ensure not masking hypoglycemic episodes.  Goal A1c between 7 and 7.5% ideally.  - Glycosylated Hemoglobin A1c  - Comprehensive Metabolic Panel    3. Essential hypertension, benign  Hypertension suboptimal control.  Will decrease metoprolol tartrate 100 mg from 1-1/2 tablets morning and 2 tablets at bedtime down to 1 tablet twice daily due to hypoglycemic symptoms and likely masking of hypoglycemia.  Increase lisinopril from 5 mg to 10 mg daily then reassess at follow-up in 4 weeks for goal less than 130/80 ideally.  Med monitoring completed today.  - metoprolol tartrate (LOPRESSOR) 100 MG tablet; Take 1 tablet (100 mg total) by mouth 2 (two) times a day.  Dispense: 60 tablet; Refill: 5  - Comprehensive Metabolic Panel  - lisinopril (PRINIVIL,ZESTRIL) 10 MG tablet; Take 1 tablet (10 mg total) by mouth daily.  Dispense: 90 tablet; Refill:  3    4. Hyperlipidemia, unspecified hyperlipidemia type  Check lipid cascade today while fasting.  Comprehensive metabolic panel for med monitoring.  Simvastatin 20 mg at bedtime.  - Comprehensive Metabolic Panel  - Lipid Cascade    5. Chronic Kidney Disease, Stage 3  History of CKD stage III.  Repeat renal function to ensure stable.  - Comprehensive Metabolic Panel  - HM2(CBC w/o Differential)    6. JENI (generalized anxiety disorder)  Continues use of Seroquel 600 mg at bedtime and clonazepam as needed.  Weaning from mirtazapine.  Followed by Dr. Álvarez psychiatrist.    7. Restless leg syndrome  Continues use of Mirapex 0.5 mg at bedtime.  - Comprehensive Metabolic Panel    8. Hypothyroidism, unspecified type  Check TSH.  - Thyroid Stimulating Hormone (TSH)    9. Vitamin D insufficiency  Check vitamin D to ensure adequate replacement.  - Vitamin D, Total (25-Hydroxy)    10. Encounter for screening for malignant neoplasm of prostate   PSA screen based on age criteria.  - PSA (Prostatic-Specific Antigen), Annual Screen    11. Hypoglycemia  Hypoglycemic episodes as noted below.  Decrease beta-blocker to ensure no masking of hypoglycemia.  Sliding scale adjustment for Novolin insulin and will continue Lantus 27 units daily.        The patient's current medical problems were reviewed.    I have had an Advance Directives discussion with the patient.  The following high BMI interventions were performed this visit: encouragement to exercise, weight monitoring, weight loss from baseline weight and lifestyle education regarding diet.  Ensure ongoing efforts to achieve weight goal < 200 pounds initially, < 190 pounds ideally.     The following health maintenance schedule was reviewed with the patient and provided in printed form in the after visit summary:   Health Maintenance   Topic Date Due     HEPATITIS C SCREENING  1965     LIPID  1965     DIABETIC EYE EXAM  1965     HIV SCREENING  03/27/1979     MEDICARE  "ANNUAL WELLNESS VISIT  03/27/1982     TD 18+ HE  03/27/1982     TDAP ADULT ONE TIME DOSE  03/27/1982     ADVANCE CARE PLANNING  03/27/1982     COLONOSCOPY  03/27/2014     ZOSTER VACCINES (1 of 2) 03/27/2014     PNEUMOCOCCAL IMMUNIZATION 19-64 HIGHEST RISK (2 of 3 - PCV13) 04/24/2014     DIABETIC FOOT EXAM  11/02/2018     INFLUENZA VACCINE RULE BASED (1) 08/01/2019     DEPRESSION FOLLOW UP  01/17/2020     A1C  01/17/2020     MICROALBUMIN  02/21/2020     BMP  07/17/2020        Subjective:     Chief Complaint: Abdi Joseph is an 54 y.o. male here for an Annual Wellness visit.     HPI: In general patient has been doing relatively well.  Describes right \"frozen shoulder\" despite physical therapy still has ongoing discomfort.  Apparently has had imaging in the past.  Had been followed by Dr. Menezes historically.  Declines further evaluation at this time.  Does describe low blood sugars into the 30s and he did have a hard time coming out of it.  Had 3 root beer and 2 sandwiches.  Blood sugars were only in the 250 range after rechecking blood sugar despite feeling that it should have been well over 400.  Had been using Lantus 25 units in the past and increased to 27 units recently.  Novolin sliding scale with meals.  Metoprolol 150 mg a.m. and did increase to 200 mg at bedtime recently.  Blood pressures have still been high with the top number described as elevated.  Lisinopril 5 mg daily also continues.  CKD stage III.  Simvastatin 20 mg at bedtime.  History of peripheral neuropathy and using gabapentin 400 mg at bedtime.  Seen by his podiatrist Dr. Jesus Dos Santos later today.  Follows with Dr. Chavez regarding depression and anxiety as well as insomnia.  Mirapex 0.5 mg daily for restless leg with benefit.  Seroquel 600 mg at bedtime as well as clonazepam as needed for anxiety.  Trazodone 50 mg at bedtime for insomnia.  Weaning from Remeron.  No headache.    Review of Systems:  Please see above.  The rest of the review of " "systems are negative for all systems.     - Grecia  Tobacco: no  EtOH: Bacardi Diet Coke a couple times per week with his wife (\"not a big drinker\")  Mom - alive 89  Dad -   Siblings: Juan Domínguez  Surgeries: right great toe amputation 11/3/17  Hospitalizations: osteomyelitis; \"lots of staph and MRSA infections\"; right TKA  Work: disability    Dr. Álvarez - psychiatrist  Dr. Jesus Dos Santos - podiatrist  Dr. Menezes - internal medicine (followed x 30 years)    19 FYI: FYI - Status Update  Who is Calling: Patient  Update: as of 19 Patients insurance approved blood-glucose meter,continuous (DEXCOM G6 ) Misc..   Okay to leave a detailed message?: Yes    19 FYI: caller stated she spoke to [facility] and was told the patient could get the Dexcom continuous glucose meter either at a discounted price if it was sent to the clinic or if it came from [facility] themselves. Caller stated if the patient gets it from a retail pharmacy, it will be more expensive for the patient. Caller stated it would cost him about $1000 at the retail pharmacy.    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, macular edema presence unspecified, unspecified retinopathy severity (H)     Pneumonia     Abdominal Pain     Intractable vomiting     DKA (diabetic ketoacidoses) (H)     Diabetes (H)     Hyperkalemia     Hyponatremia     Hypopyon ulcer     Acute-on-chronic kidney injury (H)     MRSA infection "     Hypomagnesemia     Hypertension     Thrombosis, superior sagittal sinus     Cellulitis of right foot     Hyperglycemia     Acute kidney injury (H)     Diabetic ketoacidosis without coma associated with type 1 diabetes mellitus (H)     Lactic acidosis     Foot ulcer, right, with unspecified severity (H)     Gastrointestinal hemorrhage, unspecified gastrointestinal hemorrhage type     Cellulitis     Osteomyelitis of right foot (H)     ALBERTA (acute kidney injury) (H)     Hyperlipidemia, unspecified hyperlipidemia type     Essential hypertension, benign     DKA, type 1, not at goal (H)     Acute gastroenteritis     Hospital discharge follow-up     Viral gastroenteritis     Hypoglycemia     Past Medical History:   Diagnosis Date     Anxiety      Arthritis      Cellulitis      Chronic Kidney Disease, Stage 3      Depression      Diabetes mellitus (H)      DKA (diabetic ketoacidoses) (H)      Gastroparesis      Hypertension      Hyponatremia      Hypothyroidism      MRSA (methicillin resistant Staphylococcus aureus)      Neuropathy      Osteoarthritis Of The Knee      Panic disorder      PONV (postoperative nausea and vomiting)      Retinopathy due to secondary diabetes (H)      Thrombosis of superior sagittal sinus       Past Surgical History:   Procedure Laterality Date     ABDOMINAL SURGERY       CHOLECYSTECTOMY       FOOT AMPUTATION Right 11/3/2017    Procedure:  amputation of right hallux, Sesamoid and partial 1st metatarsal;  Surgeon: Lamin Barnett DPM;  Location: [facility] [place];  Service:      KNEE ARTHROSCOPY      x3     refractory surgery       TYMPANOPLASTY W/ MASTOIDECTOMY  2012      Family History   Problem Relation Age of Onset     Heart disease Mother      Heart disease Father      Heart attack Father      Asthma Father      Diabetes Sister      Diabetes Brother      Heart attack Brother       Social History     Socioeconomic History     Marital status:      Spouse name: Not on file      Number of children: Not on file     Years of education: Not on file     Highest education level: Not on file   Occupational History     Not on file   Social Needs     Financial resource strain: Not on file     Food insecurity:     Worry: Not on file     Inability: Not on file     Transportation needs:     Medical: Not on file     Non-medical: Not on file   Tobacco Use     Smoking status: Never Smoker     Smokeless tobacco: Never Used   Substance and Sexual Activity     Alcohol use: Yes     Alcohol/week: 4.0 standard drinks     Types: 4 Shots of liquor per week     Drug use: No     Sexual activity: Not on file   Lifestyle     Physical activity:     Days per week: Not on file     Minutes per session: Not on file     Stress: Not on file   Relationships     Social connections:     Talks on phone: Not on file     Gets together: Not on file     Attends [employer] service: Not on file     Active member of club or organization: Not on file     Attends meetings of clubs or organizations: Not on file     Relationship status: Not on file     Intimate partner violence:     Fear of current or ex partner: Not on file     Emotionally abused: Not on file     Physically abused: Not on file     Forced sexual activity: Not on file   Other Topics Concern     Not on file   Social History Narrative     Not on file      Current Outpatient Medications   Medication Sig Dispense Refill     acetaminophen (TYLENOL) 500 MG tablet Take 1-2 tablets (500-1,000 mg total) by mouth every 4 (four) hours as needed.  0     aspirin 81 MG EC tablet Take 81 mg by mouth daily.       blood-glucose meter,continuous (DEXCOM G6 ) Misc Use as directed for continuous glucose monitoring. 1 each 0     blood-glucose sensor (DEXCOM G6 SENSOR) Belia Use as directed for continuous glucose monitoring. 1 Device 13     blood-glucose transmitter (DEXCOM G6 TRANSMITTER) Belia Use as directed for continuous glucose monitoring. Change every 3 months 1 Device 4      "calcium, as carbonate, (TUMS) 200 mg calcium (500 mg) chewable tablet Chew 1 tablet 3 (three) times a day as needed for heartburn.       clonazePAM (KLONOPIN) 1 MG tablet Take 1 tablet (1 mg total) by mouth every morning. 4 tablet 0     clonazePAM (KLONOPIN) 2 MG tablet Take 2 mg by mouth at bedtime.       docusate sodium (COLACE) 100 MG capsule Take 100 mg by mouth 2 (two) times a day.       gabapentin (NEURONTIN) 400 MG capsule Take 400 mg by mouth bedtime.       insulin glargine (LANTUS SOLOSTAR U-100 INSULIN) 100 unit/mL (3 mL) pen Inject 25 Units under the skin daily. Do not mix Lantus with any other insulin 30 mL 0     insulin syringe-needle U-100 0.3 mL 29 gauge x 1/2\" Syrg Use 1 each As Directed daily. 100 each 0     lisinopril (PRINIVIL,ZESTRIL) 10 MG tablet Take 1 tablet (10 mg total) by mouth daily. 90 tablet 3     methocarbamol (ROBAXIN) 750 MG tablet Take 750 mg by mouth 3 (three) times a day as needed.        metoprolol tartrate (LOPRESSOR) 100 MG tablet Take 1 tablet (100 mg total) by mouth 2 (two) times a day. 60 tablet 5     NOVOLIN R REGULAR U-100 INSULN 100 unit/mL injection Check blood sugar four (4) times daily.  10.65 Type 1 with hyperglycemia  No supplies needed  BD Ultra-fine 6 mm 31G 0.3 mL syringe - NDC [address]-21 - dispense 1 case, refill PRN for 1 year (Patient taking differently: Inject 2-4 Units under the skin 3 (three) times a day before meals. Check blood sugar four (4) times daily.  10.65 Type 1 with hyperglycemia  No supplies needed  BD Ultra-fine 6 mm 31G 0.3 mL syringe - NDC [address]-76 - dispense 1 case, refill PRN for 1 year      ) 10 mL PRN     ondansetron (ZOFRAN) 4 MG tablet Take 1 tablet (4 mg total) by mouth every 8 (eight) hours as needed for nausea. 20 tablet 3     pantoprazole (PROTONIX) 40 MG tablet TAKE 1 TABLET(40 MG) BY MOUTH TWICE DAILY 180 tablet 1     pramipexole (MIRAPEX) 0.25 MG tablet Take 2 tablets (0.5 mg total) by mouth at bedtime. 28 tablet 0     " "QUEtiapine (SEROQUEL) 300 MG tablet Take 600 mg by mouth bedtime.       QUEtiapine (SEROQUEL) 50 MG tablet Take 50 mg by mouth 2 (two) times a day Take with breakfast and lunch..             sildenafil (VIAGRA) 100 MG tablet Take 1 tablet (100 mg total) by mouth as needed for erectile dysfunction. 30 tablet 2     silver sulfADIAZINE (SILVADENE, SSD) 1 % cream Apply 1 application topically 2 (two) times a day.       simvastatin (ZOCOR) 20 MG tablet TAKE 1 TABLET(20 MG) BY MOUTH AT BEDTIME 90 tablet 3     traZODone (DESYREL) 50 MG tablet Take 1 tablet (50 mg total) by mouth at bedtime. 30 tablet 0     No current facility-administered medications for this visit.      Facility-Administered Medications Ordered in Other Visits   Medication Dose Route Frequency Provider Last Rate Last Dose     bupivacaine-epinephrine (PF) 0.75 %-1:200,000 1.8 mL, EPINEPHrine 0.01 mL, fentaNYL pf 50 mcg/mL 0.5 mL    Continuous PRN WILLIAM Starkey MBBS   2.31 mL at 09/09/14 0728      Objective:   Vital Signs:   Visit Vitals  /70   Pulse 74   Ht 5' 7.75\" (1.721 m)   Wt 213 lb (96.6 kg)   SpO2 94%   BMI 32.63 kg/m         VisionScreening:  No exam data present     PHYSICAL EXAM    General Appearance:    Alert, cooperative, no distress, appears stated age.  Obesity.   Head:    Normocephalic, without obvious abnormality, atraumatic   Eyes:    PERRL, conjunctiva/corneas clear, EOM's intact, fundi     benign, both eyes        Ears:    Normal TM's and external ear canals, both ears   Nose:   Nares normal, septum midline, mucosa normal, no drainage    or sinus tenderness   Throat:   Lips, mucosa, and tongue normal; teeth and gums normal   Neck:   Supple, symmetrical, trachea midline, no adenopathy;        thyroid:  No enlargement/tenderness/nodules; no carotid    bruit or JVD   Back:     Symmetric, no curvature, ROM normal, no CVA tenderness   Lungs:     Clear to auscultation bilaterally, respirations unlabored   Chest wall:    No " tenderness or deformity   Heart:    Regular rate and rhythm, S1 and S2 normal, no murmur, rub   or gallop   Abdomen:     Soft, non-tender, bowel sounds active all four quadrants,     no masses, no organomegaly.     Genitalia:    Normal male without lesion, discharge or tenderness.  No inguinal hernia noted.     Rectal:    Normal tone.  Prostate normal/symmetric, no masses or tenderness.   Extremities:   Extremities normal, atraumatic, no cyanosis or edema.  Absent right great toe status post first MTP amputation   Pulses:   1+ and symmetric all extremities with absent posterior tibial pulses bilateral lower extremities.   Skin:   Skin color, texture, turgor normal, no rashes or lesions   Lymph nodes:   Cervical, supraclavicular, and axillary nodes normal   Neurologic:   CNII-XII intact. Normal strength and reflexes       throughout.  History of peripheral neuropathy with abnormal monofilament testing.        Assessment Results 11/19/2019   Activities of Daily Living No help needed   Instrumental Activities of Daily Living 1 - Full function   Get Up and Go Score Less than 12 seconds   Mini Cog Total Score 4   Some recent data might be hidden     A Mini-Cog score of 0-2 suggests the possibility of dementia, score of 3-5 suggests no dementia    Identified Health Risks:     The patient was provided with suggestions to help him develop a healthy physical lifestyle.   The patient was counseled and encouraged to consider modifying their diet and eating habits. He was provided with information on recommended healthy diet options.  The patient reports that he has difficulty with instrumental activities of daily living.  He was provided with a list of local organizations that provide support services and advised to make a follow up appointment in 12 weeks to address this further.   The patient was provided with suggestions to help him develop a healthy emotional lifestyle.   The patient s PHQ-9 score is consistent with  moderate depression.  He was provided with information regarding depression and was advised to schedule a follow up appointment in 12 weeks to further address this issue.  Information regarding advance directives (living [place]), including where he can download the appropriate form, was provided to the patient via the AVS.

## 2021-06-04 VITALS
SYSTOLIC BLOOD PRESSURE: 132 MMHG | HEIGHT: 69 IN | DIASTOLIC BLOOD PRESSURE: 62 MMHG | BODY MASS INDEX: 31.99 KG/M2 | HEART RATE: 80 BPM | WEIGHT: 216 LBS

## 2021-06-04 VITALS
HEART RATE: 92 BPM | DIASTOLIC BLOOD PRESSURE: 60 MMHG | SYSTOLIC BLOOD PRESSURE: 120 MMHG | OXYGEN SATURATION: 95 % | WEIGHT: 216 LBS | BODY MASS INDEX: 31.9 KG/M2

## 2021-06-04 VITALS
HEART RATE: 88 BPM | BODY MASS INDEX: 31.9 KG/M2 | DIASTOLIC BLOOD PRESSURE: 72 MMHG | WEIGHT: 216 LBS | SYSTOLIC BLOOD PRESSURE: 120 MMHG

## 2021-06-04 VITALS
OXYGEN SATURATION: 97 % | HEART RATE: 70 BPM | WEIGHT: 210.2 LBS | SYSTOLIC BLOOD PRESSURE: 138 MMHG | DIASTOLIC BLOOD PRESSURE: 80 MMHG | BODY MASS INDEX: 31.04 KG/M2

## 2021-06-04 VITALS — BODY MASS INDEX: 30.42 KG/M2 | DIASTOLIC BLOOD PRESSURE: 77 MMHG | WEIGHT: 206 LBS | SYSTOLIC BLOOD PRESSURE: 151 MMHG

## 2021-06-04 VITALS
HEART RATE: 80 BPM | WEIGHT: 213.1 LBS | SYSTOLIC BLOOD PRESSURE: 122 MMHG | BODY MASS INDEX: 31.47 KG/M2 | DIASTOLIC BLOOD PRESSURE: 66 MMHG

## 2021-06-04 NOTE — PROGRESS NOTES
Assessment/Plan:    1. 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  Patient had hypoglycemic episodes associated with current diabetes management.  Using Lantus 27 units daily.  Using sliding scale Novolin insulin 10 units in the morning, 5 units with lunch and 10 units with supper.  Denies significant concerns for ongoing hypoglycemia at this time.  Reassess at follow-up in 3 months.  - Basic Metabolic Panel    2. Essential hypertension, benign  Hypertension stable blood pressure 116/60 on recheck.  Check base metabolic panel for med monitoring with history of CKD stage III as well as recent hyperkalemia with potassium 5.5 November 19, 2019.  - Basic Metabolic Panel    3. Hyperlipidemia, unspecified hyperlipidemia type  Remains on simvastatin 20 mg at bedtime.    4. Chronic Kidney Disease, Stage 3  CKD stage III.  Recent creatinine at 2.12 and GFR 33.  Continue to monitor closely.  Ensure adequate hydration.  - Basic Metabolic Panel    5. Hypothyroidism, unspecified type  Recent TSH elevation greater than 5.  Will recheck TSH today.  - Thyroid Stimulating Hormone (TSH)    6. Hyperkalemia  Monitor potassium closely while on lisinopril recently increased from 5 mg to 10 mg daily at prior office visit November 19, 2019.  - Basic Metabolic Panel    7. Normochromic normocytic anemia  CBC obtained to ensure stable normochromic normocytic anemia.  - HM2(CBC w/o Differential)          Subjective:    Abdi Joseph is seen today for follow-up evaluation.  Type 1 diabetes.  Previously seen November 19, 2019 for physical exam and noted hypoglycemic episodes.  A1c had improved from 7.5% down to 6.7% however directed patient to allow A1c goal less than 7.5% ideally to avoid hypoglycemia.  Cut back on metoprolol tartrate from 150 mg a.m. and 200 mg at bedtime down to 100 mg  "twice daily.  Increase lisinopril from 5 mg up to 10 mg daily however close monitoring with mild hyperkalemia with potassium 5.5 and creatinine of 2.12 and GFR 33.  No palpitations.  No significant cough.  Simvastatin for lipid management.  History of hypothyroidism however not requiring medication.  Recent TSH mildly elevated last month.  Comprehensive review of systems as above otherwise all negative.     - Grecia  Tobacco: no  EtOH: [employer] Diet Coke a couple times per week with his wife (\"not a big drinker\")  Mom - alive 89  Dad -   Siblings: Juan Domínguez  Surgeries: right great toe amputation  Hospitalizations: osteomyelitis; \"lots of staph and MRSA infections\"; right TKA  Work: disability    Dr. Álvarez - psychiatrist  Dr. Jesus Dos Santos - podiatrist  Dr. Menezes - internal medicine (followed x 30 years)      Past Surgical History:   Procedure Laterality Date     ABDOMINAL SURGERY       CHOLECYSTECTOMY       FOOT AMPUTATION Right 11/3/2017    Procedure:  amputation of right hallux, Sesamoid and partial 1st metatarsal;  Surgeon: Lamin Barnett DPM;  Location: [facility];  Service:      KNEE ARTHROSCOPY      x3     refractory surgery       TYMPANOPLASTY W/ MASTOIDECTOMY          Family History   Problem Relation Age of Onset     Heart disease Mother      Heart disease Father      Heart attack Father      Asthma Father      Diabetes Sister      Diabetes Brother      Heart attack Brother         Past Medical History:   Diagnosis Date     Anxiety      Arthritis      Cellulitis      Chronic Kidney Disease, Stage 3      Depression      Diabetes mellitus (H)      DKA (diabetic ketoacidoses) (H)      Gastroparesis      Hypertension      Hyponatremia      Hypothyroidism      MRSA (methicillin resistant Staphylococcus aureus)      Neuropathy      Osteoarthritis Of The Knee      Panic disorder      PONV (postoperative nausea and vomiting)      Retinopathy due to secondary diabetes (H)      Thrombosis " "of superior sagittal sinus         Social History     Tobacco Use     Smoking status: Never Smoker     Smokeless tobacco: Never Used   Substance Use Topics     Alcohol use: Yes     Alcohol/week: 4.0 standard drinks     Types: 4 Shots of liquor per week     Drug use: No        Current Outpatient Medications   Medication Sig Dispense Refill     acetaminophen (TYLENOL) 500 MG tablet Take 1-2 tablets (500-1,000 mg total) by mouth every 4 (four) hours as needed.  0     aspirin 81 MG EC tablet Take 81 mg by mouth daily.       blood-glucose meter,continuous (DEXCOM G6 ) Misc Use as directed for continuous glucose monitoring. 1 each 0     blood-glucose sensor (DEXCOM G6 SENSOR) Belia Use as directed for continuous glucose monitoring. 1 Device 13     blood-glucose transmitter (DEXCOM G6 TRANSMITTER) Belia Use as directed for continuous glucose monitoring. Change every 3 months 1 Device 4     calcium, as carbonate, (TUMS) 200 mg calcium (500 mg) chewable tablet Chew 1 tablet 3 (three) times a day as needed for heartburn.       clonazePAM (KLONOPIN) 1 MG tablet Take 1 tablet (1 mg total) by mouth every morning. 4 tablet 0     clonazePAM (KLONOPIN) 2 MG tablet Take 2 mg by mouth at bedtime.       docusate sodium (COLACE) 100 MG capsule Take 100 mg by mouth 2 (two) times a day.       gabapentin (NEURONTIN) 400 MG capsule Take 400 mg by mouth bedtime.       insulin glargine (LANTUS SOLOSTAR U-100 INSULIN) 100 unit/mL (3 mL) pen Inject 25 Units under the skin daily. Do not mix Lantus with any other insulin 30 mL 0     insulin syringe-needle U-100 0.3 mL 29 gauge x 1/2\" Syrg Use 1 each As Directed daily. 100 each 0     lisinopril (PRINIVIL,ZESTRIL) 10 MG tablet Take 1 tablet (10 mg total) by mouth daily. 90 tablet 3     methocarbamol (ROBAXIN) 750 MG tablet Take 750 mg by mouth 3 (three) times a day as needed.        metoprolol tartrate (LOPRESSOR) 100 MG tablet Take 1 tablet (100 mg total) by mouth 2 (two) times a day. 60 " tablet 5     NOVOLIN R REGULAR U-100 INSULN 100 unit/mL injection Check blood sugar four (4) times daily.  10.65 Type 1 with hyperglycemia  No supplies needed  BD Ultra-fine 6 mm 31G 0.3 mL syringe - NDC [address]-11 - dispense 1 case, refill PRN for 1 year (Patient taking differently: Inject 2-4 Units under the skin 3 (three) times a day before meals. Check blood sugar four (4) times daily.  10.65 Type 1 with hyperglycemia  No supplies needed  BD Ultra-fine 6 mm 31G 0.3 mL syringe - NDC [address]-81 - dispense 1 case, refill PRN for 1 year      ) 10 mL PRN     ondansetron (ZOFRAN) 4 MG tablet Take 1 tablet (4 mg total) by mouth every 8 (eight) hours as needed for nausea. 20 tablet 3     pantoprazole (PROTONIX) 40 MG tablet TAKE 1 TABLET(40 MG) BY MOUTH TWICE DAILY 180 tablet 1     pramipexole (MIRAPEX) 0.25 MG tablet Take 2 tablets (0.5 mg total) by mouth at bedtime. 28 tablet 0     QUEtiapine (SEROQUEL) 300 MG tablet Take 600 mg by mouth bedtime.       QUEtiapine (SEROQUEL) 50 MG tablet Take 50 mg by mouth 2 (two) times a day Take with breakfast and lunch..             sildenafil (VIAGRA) 100 MG tablet Take 1 tablet (100 mg total) by mouth as needed for erectile dysfunction. 30 tablet 2     silver sulfADIAZINE (SILVADENE, SSD) 1 % cream Apply 1 application topically 2 (two) times a day.       simvastatin (ZOCOR) 20 MG tablet TAKE 1 TABLET(20 MG) BY MOUTH AT BEDTIME 90 tablet 3     traZODone (DESYREL) 50 MG tablet Take 1 tablet (50 mg total) by mouth at bedtime. 30 tablet 0     No current facility-administered medications for this visit.      Facility-Administered Medications Ordered in Other Visits   Medication Dose Route Frequency Provider Last Rate Last Dose     bupivacaine-epinephrine (PF) 0.75 %-1:200,000 1.8 mL, EPINEPHrine 0.01 mL, fentaNYL pf 50 mcg/mL 0.5 mL    Continuous PRN WILLIAM Starkey MBBS   2.31 mL at 09/09/14 0728          Objective:    Vitals:    12/26/19 1057   BP: 126/64   Pulse: 78    SpO2: 97%   Weight: 216 lb (98 kg)      Body mass index is 33.09 kg/m .    Alert.  No apparent distress.  Chest clear.  Cardiac exam regular.  Extremities warm and dry.  Transfers independently.      This note has been dictated using voice recognition software and as a result may contain minor grammatical errors and unintended word substitutions.

## 2021-06-05 VITALS
HEART RATE: 99 BPM | SYSTOLIC BLOOD PRESSURE: 136 MMHG | BODY MASS INDEX: 33.52 KG/M2 | DIASTOLIC BLOOD PRESSURE: 68 MMHG | OXYGEN SATURATION: 98 % | WEIGHT: 227 LBS

## 2021-06-06 NOTE — PROGRESS NOTES
Assessment/Plan:    1. Diarrhea, unspecified type  Loose stools most likely to be secondary to Augmentin. He is well appearing and vitals are stable. He reports that symptoms are manageable. He will finish out course of Augmentin and consider starting a probiotic. If stools do not improve after course of Augmentin is complete, will consider working up for C dif. I encouraged hydration and monitoring closely for any new or worsening symptoms.    2. Influenza A  Recent diagnosis of influenza. Symptoms are improving. Continue with supportive cares.      3. Pneumonia of left lung due to infectious organism, unspecified part of lung  Finish out course of Augmentin. Monitor for worseningsymptoms.    4. Diabetic ketoacidosis without coma associated with type 1 diabetes mellitus (H)  Improved. Continue home dose of lantus.       Subjective:    Abdi Joseph is a 54 year old male seen today for evaluation of diarrhea. Past medical history of type 1 diabetes mellitus, cirrhosis of liver. Patient was recently seen in the ED with cough, vomiting and fatigue. He was diagnosed with influenza and pneumonia, and mild DKA which resolved.  He was started on Augmentin and Tamiflu and discharged home. Today, he reports feeling better in regards to the cough/vomiting/fatigue but is concerned about develoing looser stools since being home. Symptoms started within a few days of starting the Augmentin. He denies any blood in his stool. No abdominal pain or vomiting. He is having 1 -2 loose stools per day. Not watery stools. No incontinence. He took imodium once. No fevers or other systemic symptoms. He has three days left of the Augmentin and is finished with tamiflu. He does not have any additional concerns.  Review of systems is as stated in HPI, and the remainder of the 10 system review is otherwise unremarkable.      Per ED note:  XR abdomen:  Cholecystectomy clips right upper quadrant. Electronic device overlies the right lower  quadrant. Nonobstructive bowel gas pattern with no gross organomegaly nor urinary tract calculus.      XR chest:  A few linear atelectatic bands are present within the infrahilar lungs both anterior and posterior on the lateral view. There is a new subsegmental sized opacity in the left base adjacent to the left ventricular heart border visible on the frontal view   which could reflect a small focus of acute inflammation. There are no other new lung opacities. Central airway wall thickening is present. Diaphragmatic curvature is preserved. No pleural fluid.   The cardiac silhouette is at the upper limit of normal in size, accentuated by incomplete lung inflation. The vascular pedicle width is normal. Thoracic vertebra are maintained in height and shape. Unchanged posttraumatic deformity of the lateral right clavicle.    Past Medical History, Family History, and Social History reviewed.    Past Surgical History:   Procedure Laterality Date     ABDOMINAL SURGERY       CHOLECYSTECTOMY       FOOT AMPUTATION Right 11/3/2017    Procedure:  amputation of right hallux, Sesamoid and partial 1st metatarsal;  Surgeon: Lamin Barnett DPM;  Location: [facility];  Service:      KNEE ARTHROSCOPY      x3     refractory surgery       TYMPANOPLASTY W/ MASTOIDECTOMY  2012        Family History   Problem Relation Age of Onset     Heart disease Mother      Heart disease Father      Heart attack Father      Asthma Father      Diabetes Sister      Diabetes Brother      Heart attack Brother         Past Medical History:   Diagnosis Date     Anxiety      Arthritis      Cellulitis      Chronic Kidney Disease, Stage 3      Depression      Diabetes mellitus (H)      DKA (diabetic ketoacidoses) (H)      Gastroparesis      Hypertension      Hyponatremia      Hypothyroidism      MRSA (methicillin resistant Staphylococcus aureus)      Neuropathy      Osteoarthritis Of The Knee      Panic disorder      PONV (postoperative nausea and  "vomiting)      Retinopathy due to secondary diabetes (H)      Thrombosis of superior sagittal sinus         Social History     Tobacco Use     Smoking status: Never Smoker     Smokeless tobacco: Never Used   Substance Use Topics     Alcohol use: Yes     Alcohol/week: 4.0 standard drinks     Types: 4 Shots of liquor per week     Drug use: No        Current Outpatient Medications   Medication Sig Dispense Refill     acetaminophen (TYLENOL) 500 MG tablet Take 1-2 tablets (500-1,000 mg total) by mouth every 4 (four) hours as needed.  0     amoxicillin-clavulanate (AUGMENTIN) 875-125 mg per tablet Take 1 tablet by mouth 2 (two) times a day for 5 days. 10 tablet 0     aspirin 81 MG EC tablet Take 81 mg by mouth daily.       blood-glucose meter,continuous (DEXCOM G6 ) Misc Use as directed for continuous glucose monitoring. 1 each 0     blood-glucose sensor (DEXCOM G6 SENSOR) Belia Use as directed for continuous glucose monitoring. 1 Device 13     blood-glucose transmitter (DEXCOM G6 TRANSMITTER) Belia Use as directed for continuous glucose monitoring. Change every 3 months 1 Device 4     calcium, as carbonate, (TUMS) 200 mg calcium (500 mg) chewable tablet Chew 1 tablet 3 (three) times a day as needed for heartburn.       clonazePAM (KLONOPIN) 0.5 MG tablet Take 0.5 mg by mouth daily before breakfast.       clonazePAM (KLONOPIN) 0.5 MG tablet Take 1.5 mg by mouth at bedtime.       docusate sodium (COLACE) 100 MG capsule Take 100 mg by mouth 2 (two) times a day.       gabapentin (NEURONTIN) 400 MG capsule Take 400 mg by mouth bedtime.       insulin glargine,hum.rec.anlog (LANTUS SOLOSTAR U-100 INSULIN SUBQ) Inject 27 Units under the skin at bedtime.       insulin syringe-needle U-100 0.3 mL 29 gauge x 1/2\" Syrg Use 1 each As Directed daily. 100 each 0     losartan (COZAAR) 25 MG tablet Take 1 tablet (25 mg total) by mouth daily. 90 tablet 3     metoprolol tartrate (LOPRESSOR) 100 MG tablet Take 1 tablet (100 mg total) " by mouth 2 (two) times a day. 60 tablet 5     NOVOLIN R REGULAR U-100 INSULN 100 unit/mL injection Check blood sugar four (4) times daily.  10.65 Type 1 with hyperglycemia  No supplies needed  BD Ultra-fine 6 mm 31G 0.3 mL syringe - NDC [address]-29 - dispense 1 case, refill PRN for 1 year (Patient taking differently: Inject 2-4 Units under the skin 3 (three) times a day before meals. Check blood sugar four (4) times daily.  10.65 Type 1 with hyperglycemia  No supplies needed  BD Ultra-fine 6 mm 31G 0.3 mL syringe - NDC [address]-28 - dispense 1 case, refill PRN for 1 year      ) 10 mL PRN     ondansetron (ZOFRAN) 4 MG tablet Take 1 tablet (4 mg total) by mouth every 8 (eight) hours as needed for nausea. 20 tablet 3     pantoprazole (PROTONIX) 40 MG tablet TAKE 1 TABLET(40 MG) BY MOUTH TWICE DAILY 180 tablet 1     pramipexole (MIRAPEX) 0.25 MG tablet Take 2 tablets (0.5 mg total) by mouth at bedtime. 28 tablet 0     QUEtiapine (SEROQUEL) 300 MG tablet Take 600 mg by mouth bedtime.       QUEtiapine (SEROQUEL) 50 MG tablet Take 50 mg by mouth daily. Take with breakfast and lunch.       sildenafil (VIAGRA) 100 MG tablet Take 1 tablet (100 mg total) by mouth as needed for erectile dysfunction. 30 tablet 2     silver sulfADIAZINE (SILVADENE, SSD) 1 % cream Apply 1 application topically 2 (two) times a day.       simvastatin (ZOCOR) 20 MG tablet TAKE 1 TABLET(20 MG) BY MOUTH AT BEDTIME 90 tablet 3     traZODone (DESYREL) 50 MG tablet Take 1 tablet (50 mg total) by mouth at bedtime. (Patient taking differently: Take 100 mg by mouth at bedtime. ) 30 tablet 0     No current facility-administered medications for this visit.      Facility-Administered Medications Ordered in Other Visits   Medication Dose Route Frequency Provider Last Rate Last Dose     bupivacaine-epinephrine (PF) 0.75 %-1:200,000 1.8 mL, EPINEPHrine 0.01 mL, fentaNYL pf 50 mcg/mL 0.5 mL    Continuous PRN WILLIAM Starkey MBBS   2.31 mL at 09/09/14  0728          Objective:    Vitals:    02/28/20 1342   BP: 138/80   Patient Site: Left Arm   Patient Position: Sitting   Cuff Size: Adult Large   Pulse: 70   SpO2: 97%   Weight: 210 lb 3.2 oz (95.3 kg)      Body mass index is 31.04 kg/m .      General Appearance:  Alert, cooperative, no distress, appears stated age   HEENT:  Normal.  No acute findings.   Lungs:   Clear to auscultation bilaterally, respirations unlabored.  No expiratory wheeze or inspiratory crackles noted.   Heart:  Regular rate and rhythm, S1, S2 normal, no murmur, rub or gallop   Abdomen:   Soft, non-tender, positive bowel sounds, no masses, no organomegaly   Extremities: Extremities normal.  No cyanosis or edema   Skin: Warm, dry.  Skin color, texture, turgor normal, no rashes or lesions           This note has been dictated using voice recognition software. Any grammatical or context distortions are unintentional and inherent to the use of this software.

## 2021-06-06 NOTE — TELEPHONE ENCOUNTER
Tiffanie-patient called. He was told it'll take 2 days for the Dexcom 6 to arrive.  He would like a call back from you.    Abdi @ [phone]

## 2021-06-06 NOTE — TELEPHONE ENCOUNTER
Will switch from lisinopril 10 mg to losartan 25 mg daily due to cough.  Rx sent to pharmacy.  Please notify patient.

## 2021-06-06 NOTE — TELEPHONE ENCOUNTER
Triage call:   Wife Is the caller- Consent on file- With patient   diabetic   Bouts of vomiting with a fever   One day of symptoms   101F- tylenol and alternating with aspirin- advised to not use aspirin but to use Ibuprofen instead to help with fever management   BG has been running high- gave himself 15 U short acting insulin   Sips of fluids  3-5 times vomiting today   Hasn't gotten out of bed today- Last urinated 1.5 hours ago   Has gastroparesis - hard to stop vomiting when he starts   No chest pain- no shortness of breath - no abdominal pain   On eye was so dry it was painful- left eye- feels better after eye drops    Triaged to continue home care at this time- advised when to call back or seek evaluation. Reviewed additional care advice with wife and she verbalizes understanding.     Cynthia Mckenna RN BSBA [facility] Triage/Med Refill 2/19/2020 9:43 AM    Reason for Disposition    Vomiting    Protocols used: VOMITING-A-OH

## 2021-06-06 NOTE — TELEPHONE ENCOUNTER
Patient Returning Call  Reason for call:  Return call.  Information relayed to patient:  Patient was informed of Fernando Hannon MD's message below about switching the lisinopril 10 mg to losartan 25 mg.  Patient has additional questions:  No  If YES, what are your questions/concerns:  n/a  Okay to leave a detailed message?: No call back needed

## 2021-06-06 NOTE — TELEPHONE ENCOUNTER
"Pt calling: diarrhea main concern   Sx Started 4-5 days ago  4-5 episodes of diarrhea in the past 24 hrs, not watery or explosive Is \"runny\".  On day 3.5 of amoxicillin, has 2 doses of Tamiflu   No blood seen  No pain  Some nausea   Taking fluids 'fair\", pt will step up intake  U.O. ok   Very fatigued   Took Imodium AD this morning with some relief.  Per protocol pt to be seen in office today, pt prefers not to come in but to speak with PCP    Routing to care team    Irene Roberto RN  [place] Nurse Advisor    Reason for Disposition    MODERATE diarrhea (e.g., 4-6 times / day more than normal) and present > 48 hours (2 days)    Protocols used: DIARRHEA-A-OH      "

## 2021-06-06 NOTE — TELEPHONE ENCOUNTER
Drug Change Request  Who is calling?:  Patient  What is the current medication?:    lisinopril (PRINIVIL,ZESTRIL) 10 MG tablet 90 tablet 3 11/19/2019     Sig - Route: Take 1 tablet (10 mg total) by mouth daily. - Oral    Sent to pharmacy as: lisinopril 10 mg tablet (PRINIVIL,ZESTRIL)    E-Prescribing Status: Receipt confirmed by pharmacy (11/19/2019  9:07 AM CST)        What alternative is being requested?: Provider to recommend  Why the request to change?:    Patient has been waking up coughing for quite a few months.  Requested Pharmacy?: [id number]  Okay to leave a detailed message?:  Yes

## 2021-06-06 NOTE — PROGRESS NOTES
Abdi is here with his wife today to get started on his Dexcom G6 sensor system.  He brings in his new sensors as well as the transmitter, unfortunately he did not receive a new  and was not told about upgrading his current .  Printed off the information so Abdi can upgrade his G5  to G6 at home.  Asked him to reach out to me if he has trouble with this, also reach out once he does have his G6  so we can get him scheduled again for training.  Tiffanie Gaxiola RN, CDE  3.17.20

## 2021-06-06 NOTE — PROGRESS NOTES
[facility], [place]  ENDOCRINOLOGY    Diabetes Note 3/14/2020    Abdi Joseph, 1965, [id number]          Reason for visit      1. Type 1 diabetes mellitus with stage 3 chronic kidney disease (H)    2. Type 1 Diabetes Mellitus        HPI     Abdi Joseph is a very pleasant 54 y.o. old male who presents for follow up.  SUMMARY:  Abdi returns today in f/u for DM 1. He is here with his wife. His current A1c is 6.8. He was recently hospitalized for DKA after ying Influenza A that went into pneumonia.   He notes that he got diarrhea after all of the antibiotics and it has been causing him consternation. He gladly notes that he has been diarrhea free for 3 days. He has not yet gotten his Dexcom 6 up and running, but does have an appointment with CDE to get this started. He did not bring in a glucometer with him today.  He is taking Lantus, 27 units daily and uses R with meals and at HS using a Sliding Scale.       Blood glucose data:  Unavailable    Past Medical History     Patient Active Problem List   Diagnosis     Acute Colitis     Dehydration (Na, H2O)     Diabetic Ulcer Of The Left Foot     Cellulitis     Cellulitis Of The Left Foot     Hypothyroidism, unspecified type     Hyponatremia     Anemia     JENI (generalized anxiety disorder)     Chronic Kidney Disease, Stage 3     Osteoarthritis Of The Knee     Type 1 diabetes mellitus with ketoacidosis (H)     Pneumonia     Abdominal Pain     Intractable vomiting     Hyperkalemia     Hyponatremia     Hypopyon ulcer     Acute-on-chronic kidney injury (H)     MRSA infection     Hypomagnesemia     Hypertension     Thrombosis, superior sagittal sinus     Cellulitis of right foot     Hyperglycemia     Acute kidney injury (H)     Diabetic ketoacidosis without coma associated with type 1 diabetes mellitus (H)     Lactic acidosis     Foot ulcer, right, with unspecified severity (H)     Gastrointestinal hemorrhage, unspecified gastrointestinal hemorrhage type     Cellulitis      Osteomyelitis of right foot (H)     ALBERTA (acute kidney injury) (H)     Hyperlipidemia, unspecified hyperlipidemia type     Essential hypertension, benign     Type 1 diabetes mellitus with kidney complication (H)     Acute gastroenteritis     Hospital discharge follow-up     Viral gastroenteritis     Hypoglycemia     Influenza A     Panic disorder     Depression     Anxiety     CKD (chronic kidney disease) stage 3, GFR 30-59 ml/min (H)     Pneumonia due to infectious organism     Blood clots in brain     Diarrhea     Frequent falls     Gastroparesis     Localized edema     Major depressive disorder, recurrent episode, moderate (H)     Myoclonic jerking     Tremor     Optic neuritis     Polypharmacy     Septic arthritis (H)     DJD (degenerative joint disease) of knee        Family History       family history includes Asthma in his father; Diabetes in his brother and sister; Heart attack in his brother and father; Heart disease in his father and mother.    Social History      reports that he has never smoked. He has never used smokeless tobacco. He reports current alcohol use of about 4.0 standard drinks of alcohol per week. He reports that he does not use drugs.      Review of Systems     Patient has no polyuria or polydipsia, no chest pain, dyspnea or TIA's, no numbness, tingling or pain in extremities  Remainder negative except as noted in HPI.    Vital Signs     /66 (Patient Site: Right Arm, Patient Position: Sitting, Cuff Size: Adult Regular) Comment (Cuff Size): long  Pulse 80   Wt 213 lb 1.6 oz (96.7 kg)   BMI 31.47 kg/m    Wt Readings from Last 3 Encounters:   03/11/20 213 lb 1.6 oz (96.7 kg)   02/28/20 210 lb 3.2 oz (95.3 kg)   02/23/20 205 lb 11.2 oz (93.3 kg)       Physical Exam     Constitutional:  Well developed, Well nourished  HENT:  Normocephalic,   Neck: Thyroid normal, No lymph nodes, Supple  Eyes:  PERRL, Conjunctiva pink  Respiratory:  Normal breath sounds, No respiratory  distress  Cardiovascular:  Normal heart rate, Normal rhythm, No murmurs  GI:  Bowel sounds normal, Soft, No tenderness  Musculoskeletal:  No gross deformity or lesions, normal dorsalis pedis pulses  Skin: No acanthosis nigricans, lipoatrophy or lipodystrophy  Neurologic:  Alert & oriented x 3, nonfocal  Psychiatric:  Affect, Mood, Insight appropriate      Assessment     1. Type 1 diabetes mellitus with stage 3 chronic kidney disease (H)    2. Type 1 Diabetes Mellitus        Plan     Abdi is currently stable in his management. He will soon have the Dexcom 6 up and running to help him track his BG. He will continue with his current medication regimen, and f/u with me in 6 months. Time spent with pt today: 25 min with >50% spent in counseling and coordination of care.        Beatris FERRERA Endocrinology  3/14/2020  9:04 AM        Lab Results     Hemoglobin A1c   Date Value Ref Range Status   03/04/2020 6.8 (H) 3.5 - 6.0 % Final   02/23/2020 6.8 (H) 4.2 - 6.1 % Final   11/19/2019 6.7 (H) 3.5 - 6.0 % Final   07/17/2019 7.5 (H) 3.5 - 6.0 % Final   03/28/2019 7.4 (H) 3.5 - 6.0 % Final     Creatinine   Date Value Ref Range Status   03/04/2020 1.90 (H) 0.70 - 1.30 mg/dL Final   02/25/2020 1.59 (H) 0.70 - 1.30 mg/dL Final   02/24/2020 1.85 (H) 0.70 - 1.30 mg/dL Final     Microalbumin, Random Urine   Date Value Ref Range Status   03/04/2020 44.43 (H) 0.00 - 1.99 mg/dL Final       Cholesterol   Date Value Ref Range Status   11/19/2019 124 <=199 mg/dL Final     HDL Cholesterol   Date Value Ref Range Status   11/19/2019 40 >=40 mg/dL Final     LDL Calculated   Date Value Ref Range Status   11/19/2019 54 <=129 mg/dL Final     Triglycerides   Date Value Ref Range Status   11/19/2019 148 <=149 mg/dL Final       Lab Results   Component Value Date    ALT 17 02/22/2020    AST 21 02/22/2020    ALKPHOS 122 (H) 02/22/2020    BILITOT 0.4 02/22/2020         Current Medications     Outpatient Medications Prior to Visit   Medication Sig  Dispense Refill     acetaminophen (TYLENOL) 500 MG tablet Take 1-2 tablets (500-1,000 mg total) by mouth every 4 (four) hours as needed.  0     aspirin 81 MG EC tablet Take 81 mg by mouth daily.       blood-glucose meter,continuous (DEXCOM G6 ) Misc Use as directed for continuous glucose monitoring. 1 each 0     blood-glucose sensor (DEXCOM G6 SENSOR) Belia Use as directed for continuous glucose monitoring. 1 Device 13     blood-glucose transmitter (DEXCOM G6 TRANSMITTER) Belia Use as directed for continuous glucose monitoring. Change every 3 months 1 Device 4     calcium, as carbonate, (TUMS) 200 mg calcium (500 mg) chewable tablet Chew 1 tablet 3 (three) times a day as needed for heartburn.       clonazePAM (KLONOPIN) 0.5 MG tablet Take 0.5 mg by mouth daily before breakfast.       clonazePAM (KLONOPIN) 0.5 MG tablet Take 1.5 mg by mouth at bedtime.       docusate sodium (COLACE) 100 MG capsule Take 100 mg by mouth 2 (two) times a day.       gabapentin (NEURONTIN) 400 MG capsule Take 400 mg by mouth bedtime.       insulin glargine,hum.rec.anlog (LANTUS SOLOSTAR U-100 INSULIN SUBQ) Inject 27 Units under the skin at bedtime.       losartan (COZAAR) 25 MG tablet Take 1 tablet (25 mg total) by mouth daily. 90 tablet 3     metoprolol tartrate (LOPRESSOR) 100 MG tablet Take 1 tablet (100 mg total) by mouth 2 (two) times a day. 60 tablet 5     NOVOLIN R REGULAR U-100 INSULN 100 unit/mL injection Check blood sugar four (4) times daily.  10.65 Type 1 with hyperglycemia  No supplies needed  BD Ultra-fine 6 mm 31G 0.3 mL syringe - NDC [address]-45 - dispense 1 case, refill PRN for 1 year (Patient taking differently: Inject 2-4 Units under the skin 3 (three) times a day before meals. Check blood sugar four (4) times daily.  10.65 Type 1 with hyperglycemia  No supplies needed  BD Ultra-fine 6 mm 31G 0.3 mL syringe - NDC [address]-82 - dispense 1 case, refill PRN for 1 year      ) 10 mL PRN     ondansetron (ZOFRAN) 4 MG  "tablet Take 1 tablet (4 mg total) by mouth every 8 (eight) hours as needed for nausea. 20 tablet 3     pantoprazole (PROTONIX) 40 MG tablet TAKE 1 TABLET(40 MG) BY MOUTH TWICE DAILY 180 tablet 1     pramipexole (MIRAPEX) 0.25 MG tablet Take 2 tablets (0.5 mg total) by mouth at bedtime. 28 tablet 0     QUEtiapine (SEROQUEL) 300 MG tablet Take 600 mg by mouth bedtime.       QUEtiapine (SEROQUEL) 50 MG tablet Take 50 mg by mouth daily. Take with breakfast and lunch.       sildenafil (VIAGRA) 100 MG tablet Take 1 tablet (100 mg total) by mouth as needed for erectile dysfunction. 30 tablet 2     silver sulfADIAZINE (SILVADENE, SSD) 1 % cream Apply 1 application topically 2 (two) times a day.       simvastatin (ZOCOR) 20 MG tablet TAKE 1 TABLET(20 MG) BY MOUTH AT BEDTIME 90 tablet 3     traZODone (DESYREL) 50 MG tablet Take 1 tablet (50 mg total) by mouth at bedtime. (Patient taking differently: Take 100 mg by mouth at bedtime. ) 30 tablet 0     insulin syringe-needle U-100 0.3 mL 29 gauge x 1/2\" Syrg Use 1 each As Directed daily. 100 each 0     Facility-Administered Medications Prior to Visit   Medication Dose Route Frequency Provider Last Rate Last Dose     bupivacaine-epinephrine (PF) 0.75 %-1:200,000 1.8 mL, EPINEPHrine 0.01 mL, fentaNYL pf 50 mcg/mL 0.5 mL    Continuous PRN WILLIAM Starkey MBBS   2.31 mL at 09/09/14 0728           "

## 2021-06-07 NOTE — TELEPHONE ENCOUNTER
Patient called. He received his Dexcom 6 and is having a hard time setting up.    Please call patient @ [phone]

## 2021-06-07 NOTE — TELEPHONE ENCOUNTER
Patient Returning Call  Reason for call:  Patient returning call to check on the status of this request.  Information relayed to patient:  Pending providers review and approval.  Patient has additional questions:  yes  If YES, what are your questions/concerns:  I need these shoes and there has been several request starting earlier this month.   Okay to leave a detailed message?: Yes

## 2021-06-07 NOTE — TELEPHONE ENCOUNTER
Forms Request  Name of form/paperwork: Other:  Diabetic shoes form  Have you been seen for this request: N/A  Do we have the form: Yes- patient stated his podiatrist, Dr. Jesus Dos Santos's office, faxed this form to [facility]  When is form needed by: as soon as possible, patient stated he is not able to get diabetic shoes without this form being filled out  How would you like the form returned: Fax:  N/A  Patient Notified form requests are processed in 3-5 business days: No    Okay to leave a detailed message? Yes  [phone]

## 2021-06-07 NOTE — TELEPHONE ENCOUNTER
Refill Approved    Rx renewed per Medication Renewal Policy. Medication was last renewed on 11/12/19.    Irene Cadena, [facility] Triage/Med Refill 4/29/2020     Requested Prescriptions   Pending Prescriptions Disp Refills     pantoprazole (PROTONIX) 40 MG tablet [Pharmacy Med Name: PANTOPRAZOLE 40MG TABLETS] 180 tablet 1     Sig: TAKE 1 TABLET(40 MG) BY MOUTH TWICE DAILY       GI Medications Refill Protocol Passed - 4/28/2020  7:16 AM        Passed - PCP or prescribing provider visit in last 12 or next 3 months.     Last office visit with prescriber/PCP: 12/26/2019 Fernando Hannon MD OR same dept: 2/28/2020 Deya Serna CNP OR same specialty: 2/28/2020 Deya Serna CNP  Last physical: 11/19/2019 Last MTM visit: Visit date not found   Next visit within 3 mo: Visit date not found  Next physical within 3 mo: Visit date not found  Prescriber OR PCP: Fernando Hannon MD  Last diagnosis associated with med order: 1. Gastrointestinal hemorrhage, unspecified gastrointestinal hemorrhage type  - pantoprazole (PROTONIX) 40 MG tablet [Pharmacy Med Name: PANTOPRAZOLE 40MG TABLETS]; TAKE 1 TABLET(40 MG) BY MOUTH TWICE DAILY  Dispense: 180 tablet; Refill: 1    If protocol passes may refill for 12 months if within 3 months of last provider visit (or a total of 15 months).

## 2021-06-07 NOTE — PROGRESS NOTES
"Abdi Joseph is a 54 y.o. male who is being evaluated via a billable telephone visit.      The patient has been notified of following:     \"This telephone visit will be conducted via a call between you and your physician/provider. We have found that certain health care needs can be provided without the need for a physical exam.  This service lets us provide the care you need with a short phone conversation.  If a prescription is necessary we can send it directly to your pharmacy.  If lab work is needed we can place an order for that and you can then stop by our lab to have the test done at a later time.    If during the course of the call the physician/provider feels a telephone visit is not appropriate, you will not be charged for this service.\"         Abdi Joseph complains of    Chief Complaint   Patient presents with     Diabetes     type 1 dm - no other concerns       I have reviewed and updated the patient's Past Medical History, Social History, Family History and Medication List.    ALLERGIES  Linezolid; Metoclopramide; and Prochlorperazine edisylate     - Grecia  Tobacco: no  EtOH: [employer] Diet Coke a couple times per week with his wife (\"not a big drinker\")  Mom - alive 89  Dad -   Siblings: Juan Domínguez  Surgeries: right great toe amputation  Hospitalizations: osteomyelitis; \"lots of staph and MRSA infections\"; right TKA  Work: disability    Dr. Álvarez - psychiatrist  Dr. Jesus Dos Santos - podiatrist  Dr. Menezes - internal medicine (followed x 30 years)    Additional provider notes: Virtual visit completed today.  Type 1 diabetes.  Longstanding history.  Has been set up for Dexcom 6 device.  Well visit.  Lower blood sugar limits at 80.  Blood sugars typically in the 120s when he gets up.  Was 123 after insulin this morning.  159 initially upon awakening.  History of microalbuminuria.  Losartan 25 mg daily metoprolol tartrate 100 mg twice daily for hypertension.  Not checking blood pressures " consistently.  Blood pressure was 190/78 initially when checked this morning and then 151/77 on recheck.  CKD stage III with creatinine 1.9 GFR 37.  Simvastatin 20 mg daily for lipid management.  Using 27 units of Lantus in the morning.  7 units of Novolin insulin with meals +3 units at bedtime.  Trying to ensure no insulin reactions of course with most recent A1c at 6.8% March 4, 2020.  Visual impairment associated with retinopathy secondary to type 1 diabetes.        Assessment/Plan:    1. 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  Schedule follow-up with Beatris Kendall CNP September 15, 2020 with endocrinology.  Anticipate follow-up in this office in July 2020.  Continues to work with diabetes educator for insulin adjustments.    2. Essential hypertension, benign  Hypertension suboptimal control with blood pressure improving to 151/77 on recheck and will increase losartan from 25 mg daily up to 50 mg daily.  New prescription sent to local pharmacy.  Benefits for microalbuminuria.  Ensure renal function remained stable with CKD stage III history.  Ensure no concern for hyperkalemia while on higher dose angiotensin receptor blocker.  - losartan (COZAAR) 50 MG tablet; Take 1 tablet (50 mg total) by mouth daily.  Dispense: 90 tablet; Refill: 3    3. Hyperlipidemia, unspecified hyperlipidemia type  Hyperlipidemia.  Continues use of simvastatin 20 mg at bedtime.    4. Chronic Kidney Disease, Stage 3  CKD stage III with recent creatinine 1.9 and GFR 37.  Microalbuminuria noted.    5. Hypothyroidism, unspecified type  Hypothyroidism history.  Not requiring thyroid replacement currently with normal TSH December 26, 2019 as noted below.    Lab Results   Component Value Date    TSH 2.79 12/26/2019            Phone call duration:  15 minutes    Fernando Hannon MD

## 2021-06-08 NOTE — TELEPHONE ENCOUNTER
"Patient calls today concerned with 2 main concerns: high blood pressure readings and burning pain in bilateral hands.      1)  HTN: Patient checks blood pressures 2x/day. Notices his pressures have been elevated the last week or so when he checks at dinnertime.  He takes metoprolol 2x daily, once in the morning and once before bed.    BP yesterday morning was 156/82 (after taking metoprolol.)  BP taken at dinner time yesterday was 192/94 (before taking evening dose of metoprolol.)  BP this morning was 159/81 (after morning dose of metoprolol.)  Denies symptoms of HTN. Patient does not have headaches, changes in vision, chest pains, or trouble breathing. Denies recent changes to fluid intake or sodium intake. Patient does mention \"I do like to eat a lot of potato chips.\" I recommended that patient try to limit intake of salty foods. He also mentions bilateral ankles are slightly swollen but this has been an ongoing issue for months. Denies any urinary issues or urinary retention.    2) Reports burning pain in bilateral hands x6 months. Patient states aching burning pain in both hands wakes him up during the night. 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 Denies swelling or discoloration in the hands. States blood sugars have been controlled. BG this morning was 146. States last A1C was 6.9%. I do see per the chart that he takes gabapentin 400mg daily HS.     I recommended that patient have visit with PCP to discuss his symptoms. May need medication adjustment. Telephone appt scheduled with PCP Dr. Fernando Tovar 6/2/20 at 10am.     Jamaica Sainz RN    Reason for Disposition    Systolic BP >= 180 OR Diastolic >= 110    Patient wants to be seen    Answer Assessment - Initial Assessment Questions  1. BLOOD PRESSURE: \"What is the blood pressure?\" \"Did you take at least two measurements 5 minutes apart?\"        Blood pressures in the mornings are [id number]. BP is elevated before dinner time. BP was 171/84 before dinner " "on 5/30/20. /94 before dinner on 5/31/20.  2. ONSET: \"When did you take your blood pressure?\"      Takes BP 2x daily. Once in the morning and once at dinner time.  3. HOW: \"How did you obtain the blood pressure?\" (e.g., visiting nurse, automatic home BP monitor)      Home BP cuff.  4. HISTORY: \"Do you have a history of high blood pressure?\"      yes  5. MEDICATIONS: \"Are you taking any medications for blood pressure?\" \"Have you missed any doses recently?\"      Patient takes losartan and metoprolol. Has been taking as directed. Denies missing any doses.  6. OTHER SYMPTOMS: \"Do you have any symptoms?\" (e.g., headache, chest pain, blurred vision, difficulty breathing, weakness)      Denies headache, chest pain, visual changes, or shortness of breath. Patient states he has burning/aching pain in bilateral hands x6 months.  7. PREGNANCY: \"Is there any chance you are pregnant?\" \"When was your last menstrual period?\"      no    Protocols used: HIGH BLOOD PRESSURE-A-OH      "

## 2021-06-08 NOTE — PROGRESS NOTES
"Abdi Joseph is a 55 y.o. male who is being evaluated via a billable telephone visit.      The patient has been notified of following:     \"This telephone visit will be conducted via a call between you and your physician/provider. We have found that certain health care needs can be provided without the need for a physical exam.  This service lets us provide the care you need with a short phone conversation.  If a prescription is necessary we can send it directly to your pharmacy.  If lab work is needed we can place an order for that and you can then stop by our lab to have the test done at a later time.    Telephone visits are billed at different rates depending on your insurance coverage. During this emergency period, for some insurers they may be billed the same as an in-person visit.  Please reach out to your insurance provider with any questions.    If during the course of the call the physician/provider feels a telephone visit is not appropriate, you will not be charged for this service.\"    Patient has given verbal consent to a Telephone visit? Yes    What phone number would you like to be contacted at? [phone]    Patient would like to receive their AVS by AVS Preference: Ken.     - Grecia  Tobacco: no  EtOH: [employer] Diet Coke a couple times per week with his wife (\"not a big drinker\")  Mom - alive 89  Dad -   Siblings: Juan Domínguez  Surgeries: right great toe amputation  Hospitalizations: osteomyelitis; \"lots of staph and MRSA infections\"; right TKA  Work: disability    Dr. Álvarez - psychiatrist  Dr. Jesus Dos Santos - podiatrist  Dr. Menezes - internal medicine (followed x 30 years)    19 FYI: FYI - Status Update  Who is Calling: Patient  Update: as of 19 Patients insurance approved blood-glucose meter,continuous (DEXCOM G6 ) Misc..   Okay to leave a detailed message?: Yes    19 FYI: caller stated she spoke to [employer] and was told the patient could get the " Dexcom continuous glucose meter either at a discounted price if it was sent to the clinic or if it came from [facility] themselves. Caller stated if the patient gets it from a retail pharmacy, it will be more expensive for the patient. Caller stated it would cost him about $1000 at the retail pharmacy.      Additional provider notes:   Virtual visit completed.  Type 1 diabetes.  Blood sugar was 126 this morning.  Using Lantus insulin 27 units daily.  Refill had been provided yesterday by Beatris Kendall CNP.  States blood pressures in the morning in the 130s to 140s over 70-80 range with evening blood pressures ranging between 150s and 190 over 80s to 90 typically.  No headache.  Bilateral hand pain.  Tylenol has not been helpful.  Comprehensive review of systems as above otherwise all negative.        Assessment/Plan:    1. Essential hypertension, benign  Prior increase in losartan from 25 mg up to 50 mg in March.  Will increase to 50 mg twice daily and reassess in office in 4 weeks.  Ensure stable renal function with history of CKD stage IIIb.  Prior creatinine of 1.9 and GFR 37 with microalbuminuria noted.  - losartan (COZAAR) 50 MG tablet; Take 1 tablet (50 mg total) by mouth 2 (two) times a day.  Dispense: 180 tablet; Refill: 3    2. 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  Refill on Lantus had been provided for 27 units daily with Novolin regular insulin with meals..  Continue use of Dexcom G6 sensor.    3. Hyperlipidemia, unspecified hyperlipidemia type  Continue simvastatin 20 mg at bedtime.    4. Bilateral hand pain  Glucosamine 500 mg 3 times daily plus Tylenol extra strength as needed.        Phone call duration:  11 minutes    Fernando Hannon MD

## 2021-06-09 ENCOUNTER — OFFICE VISIT - HEALTHEAST (OUTPATIENT)
Dept: RHEUMATOLOGY | Facility: CLINIC | Age: 56
End: 2021-06-09

## 2021-06-09 DIAGNOSIS — E10.42 DIABETIC POLYNEUROPATHY ASSOCIATED WITH TYPE 1 DIABETES MELLITUS (H): ICD-10-CM

## 2021-06-09 DIAGNOSIS — G56.03 BILATERAL CARPAL TUNNEL SYNDROME: ICD-10-CM

## 2021-06-09 NOTE — PROGRESS NOTES
Assessment/Plan:    1. Type 1 diabetes mellitus with ketoacidosis without coma (H)  Type 1 diabetes with occasional hypoglycemic episode.  Discussed insulin reaction however blood sugars never less than 60.  We will decrease his mealtime insulin especially before bed to ensure no overnight hypoglycemia.  Typically uses between 3 and 5 units of Novolin regular insulin otherwise we will continue Lantus 27 units daily.  Needs to replace Dexcom G6 sensor.  - Glycosylated Hemoglobin A1c  - BMP    2. Essential hypertension, benign  Hypertension improved control of losartan 50 mg twice daily.  Ensure able renal function with history of CKD stage IIIb  - BMP    3. Chronic Kidney Disease, Stage 3  Check basic metabolic panel today.  - BMP    4. Hyperlipidemia, unspecified hyperlipidemia type  Continue simvastatin 20 mg at bedtime.  Not fasting today.  Will reassess at follow-up in 4 months.    5. Bilateral hand pain  Bilateral hand pain.  Utilizing glucosamine 500 mg 3 times daily with some benefit along with Tylenol Extra Strength.  Tramadol was provided patient request for use sparingly for breakthrough pain only otherwise would recommend rheumatology referral.  - traMADoL (ULTRAM) 50 mg tablet; Take 1 tablet (50 mg total) by mouth every 6 (six) hours as needed for pain.  Dispense: 20 tablet; Refill: 0    PHQ 9 questionnaire 13 out of 27 and JENI 7 questionnaire 10 out of 21.        Subjective:    Abdi Joseph is seen today for follow-up evaluation.  Type 1 diabetes.  Has history of diabetic retinopathy.  Seen by vitreoretinal surgeon May 5, 2020.  Note reviewed.  Continues Lantus 27 units daily.  Now with Dexcom G6 meter and A1c's have been continue to improve from around 7-1/2% down to 6.8% March 4, 2020.  Occasional insulin reaction with episode around 60 at 2 AM after taking 7 units of novel on regular insulin last evening due to some mild hyperglycemia.  Drank a can of beer at 2 AM and blood sugars in over 200  "following.  Prior increase losartan from 25 mg up to 50 mg daily in March and then more recently up to twice daily dosing due to suboptimal blood pressure control.  History of CKD stage IIIb with creatinine 1.9 GFR 37.  Continue simvastatin 20 mg at bedtime.  Seen by vitreal retinal surgeon on May 5, 2020 for bilateral diabetic retinopathy.  Bilateral hand pain with mild benefit of glucosamine otherwise persistent issues and would like pain management.     - Grecia  Tobacco: no  EtOH: [employer] Diet Coke a couple times per week with his wife (\"not a big drinker\")  Mom - alive 89  Dad -   Siblings: Juan Domínguez  Surgeries: right great toe amputation  Hospitalizations: osteomyelitis; \"lots of staph and MRSA infections\"; right TKA  Work: disability    Dr. Álvarez - psychiatrist  Dr. Jesus Dos Santos - podiatrist  Dr. Menezes - internal medicine (followed x 30 years)    19 FYI: FYI - Status Update  Who is Calling: Patient  Update: as of 19 Patients insurance approved blood-glucose meter,continuous (DEXCOM G6 ) Misc..   Okay to leave a detailed message?: Yes    19 FYI: caller stated she spoke to [employer] and was told the patient could get the Dexcom continuous glucose meter either at a discounted price if it was sent to the clinic or if it came from [facility] themselves. Caller stated if the patient gets it from a retail pharmacy, it will be more expensive for the patient. Caller stated it would cost him about $1000 at the retail pharmacy.    Past Surgical History:   Procedure Laterality Date     ABDOMINAL SURGERY       CHOLECYSTECTOMY       FOOT AMPUTATION Right 11/3/2017    Procedure:  amputation of right hallux, Sesamoid and partial 1st metatarsal;  Surgeon: Lamin Barnett DPM;  Location: [facility] OR;  Service:      KNEE ARTHROSCOPY      x3     refractory surgery       TYMPANOPLASTY W/ MASTOIDECTOMY  2012        Family History   Problem Relation Age of Onset     Heart disease " Mother      Heart disease Father      Heart attack Father      Asthma Father      Diabetes Sister      Diabetes Brother      Heart attack Brother         Past Medical History:   Diagnosis Date     Anxiety      Arthritis      Cellulitis      Chronic Kidney Disease, Stage 3      Depression      Diabetes mellitus (H)      DKA (diabetic ketoacidoses) (H)      Gastroparesis      Hypertension      Hyponatremia      Hypothyroidism      MRSA (methicillin resistant Staphylococcus aureus)      Neuropathy      Osteoarthritis Of The Knee      Panic disorder      PONV (postoperative nausea and vomiting)      Retinopathy due to secondary diabetes (H)      Thrombosis of superior sagittal sinus         Social History     Tobacco Use     Smoking status: Never Smoker     Smokeless tobacco: Never Used   Substance Use Topics     Alcohol use: Yes     Alcohol/week: 4.0 standard drinks     Types: 4 Shots of liquor per week     Drug use: No        Current Outpatient Medications   Medication Sig Dispense Refill     acetaminophen (TYLENOL) 500 MG tablet Take 1-2 tablets (500-1,000 mg total) by mouth every 4 (four) hours as needed.  0     ARIPiprazole (ABILIFY) 2 MG tablet TAKE 1 TABLET EVERY MORNING TO DECREASE ANXIETY       aspirin 81 MG EC tablet Take 81 mg by mouth daily.       blood-glucose sensor (DEXCOM G6 SENSOR) Belia Use as directed for continuous glucose monitoring. 1 Device 13     blood-glucose transmitter (DEXCOM G6 TRANSMITTER) Belia Use as directed for continuous glucose monitoring. Change every 3 months 1 Device 4     calcium, as carbonate, (TUMS) 200 mg calcium (500 mg) chewable tablet Chew 1 tablet 3 (three) times a day as needed for heartburn.       clonazePAM (KLONOPIN) 0.5 MG tablet Take 0.5 mg by mouth daily before breakfast.       clonazePAM (KLONOPIN) 0.5 MG tablet Take 1.5 mg by mouth at bedtime.       docusate sodium (COLACE) 100 MG capsule Take 100 mg by mouth 2 (two) times a day.       gabapentin (NEURONTIN) 400 MG  "capsule Take 400 mg by mouth bedtime.       insulin glargine (LANTUS SOLOSTAR U-100 INSULIN) 100 unit/mL (3 mL) pen Inject 27 Units under the skin at bedtime. 25 mL 0     insulin syringe-needle U-100 0.3 mL 29 gauge Syrg USE ONCE DAILY 100 each 0     losartan (COZAAR) 50 MG tablet Take 1 tablet (50 mg total) by mouth 2 (two) times a day. 180 tablet 3     metoprolol tartrate (LOPRESSOR) 100 MG tablet Take 1 tablet (100 mg total) by mouth 2 (two) times a day. 60 tablet 5     NOVOLIN R REGULAR U-100 INSULN 100 unit/mL injection Check blood sugar four (4) times daily.  10.65 Type 1 with hyperglycemia  No supplies needed  BD Ultra-fine 6 mm 31G 0.3 mL syringe - NDC [address]-09 - dispense 1 case, refill PRN for 1 year (Patient taking differently: Inject 2-4 Units under the skin 3 (three) times a day before meals. Check blood sugar four (4) times daily.  10.65 Type 1 with hyperglycemia  No supplies needed  BD Ultra-fine 6 mm 31G 0.3 mL syringe - NDC [address]-47 - dispense 1 case, refill PRN for 1 year      ) 10 mL PRN     ondansetron (ZOFRAN) 4 MG tablet TAKE 1 TABLET (4 MG TOTAL) BY MOUTH EVERY 8 (EIGHT) HOURS AS NEEDED FOR NAUSEA. 20 tablet 3     pantoprazole (PROTONIX) 40 MG tablet TAKE 1 TABLET(40 MG) BY MOUTH TWICE DAILY 180 tablet 3     pen needle, diabetic (BD ULTRA-FINE VICTORIANO PEN NEEDLE) 32 gauge x 5/32\" Ndle Use daily with insulin pen 100 each 6     pramipexole (MIRAPEX) 0.25 MG tablet Take 2 tablets (0.5 mg total) by mouth at bedtime. 28 tablet 0     QUEtiapine (SEROQUEL) 300 MG tablet Take 600 mg by mouth bedtime.       QUEtiapine (SEROQUEL) 50 MG tablet Take 50 mg by mouth daily. Take with breakfast and lunch.       sildenafil (VIAGRA) 100 MG tablet Take 1 tablet (100 mg total) by mouth as needed for erectile dysfunction. 30 tablet 2     simvastatin (ZOCOR) 20 MG tablet TAKE 1 TABLET(20 MG) BY MOUTH AT BEDTIME 90 tablet 3     traZODone (DESYREL) 50 MG tablet Take 1 tablet (50 mg total) by mouth at bedtime. " (Patient taking differently: Take 100 mg by mouth at bedtime. ) 30 tablet 0     traMADoL (ULTRAM) 50 mg tablet Take 1 tablet (50 mg total) by mouth every 6 (six) hours as needed for pain. 20 tablet 0     No current facility-administered medications for this visit.      Facility-Administered Medications Ordered in Other Visits   Medication Dose Route Frequency Provider Last Rate Last Dose     bupivacaine-epinephrine (PF) 0.75 %-1:200,000 1.8 mL, EPINEPHrine 0.01 mL, fentaNYL pf 50 mcg/mL 0.5 mL    Continuous PRN WILLIAM Starkey MBBS   2.31 mL at 09/09/14 0728          Objective:    Vitals:    06/30/20 1024   BP: 120/60   Pulse: 92   SpO2: 95%   Weight: 216 lb (98 kg)      Body mass index is 31.9 kg/m .    Alert.  No apparent distress.  Chest clear.  Cardiac exam appears regular.  Fingers somewhat cool with skin slightly tense with mildly delayed capillary refill without decreased skin turgor.  Symmetric findings.      This note has been dictated using voice recognition software and as a result may contain minor grammatical errors and unintended word substitutions.

## 2021-06-09 NOTE — TELEPHONE ENCOUNTER
Drug Change Request  Who is calling?:  Patient  What is the current medication?:    Disp  Refills  Start  End      traMADoL (ULTRAM) 50 mg tablet  20 tablet  0  6/30/2020      Sig - Route: Take 1 tablet (50 mg total) by mouth every 6 (six) hours as needed for pain. - Oral     Sent to pharmacy as: traMADoL 50 mg tablet (ULTRAM)     E-Prescribing Status: Receipt confirmed by pharmacy (6/30/2020 10:58 AM CDT)       What alternative is being requested?: a stronger pain medication  Why the request to change?:  Patient stated he would like something stronger to help with his hand pain. Patient stated the tramadol is not doing anything for his bilateral hand pain. Patient denied that they are worse but he can't even hold the phone. Patient stated he requested a refill on the tramadol and if Fernando Hannon MD sends something over, that refill request can be disregarded.  Requested Pharmacy?: [facility]  Okay to leave a detailed message?:  Yes   [phone]

## 2021-06-09 NOTE — TELEPHONE ENCOUNTER
Spoke with Dr. Hannon who would recommend patient continue Tramadol as prescribed. He has placed an order for patient to meet with rheumatology.    Reason contacted:  Symptom  Information relayed:  Notified patient of the above information and provided the telephone number to rheumatology.   Additional questions:  No  Further follow-up needed:  No  Okay to leave a detailed message:  No

## 2021-06-09 NOTE — TELEPHONE ENCOUNTER
Do you want him to see rheumatology in regards to his hand pain? Do you want to provide an alternate pain medication?     Per 6/30/2020 OV note:   5. Bilateral hand pain  Bilateral hand pain.  Utilizing glucosamine 500 mg 3 times daily with some benefit along with Tylenol Extra Strength.  Tramadol was provided patient request for use sparingly for breakthrough pain only otherwise would recommend rheumatology referral.  - traMADoL (ULTRAM) 50 mg tablet; Take 1 tablet (50 mg total) by mouth every 6 (six) hours as needed for pain.  Dispense: 20 tablet; Refill: 0

## 2021-06-10 NOTE — TELEPHONE ENCOUNTER
Upcoming Appointment Question  When is the appointment: Today  What is your appointment for?: pain management  Who is your appointment scheduled with?: PCP only  What is your question/concern?: I was scheduled at 3:00 today but was not able to answer the phone when called. Please try again.    Okay to leave a detailed message?: Yes

## 2021-06-10 NOTE — TELEPHONE ENCOUNTER
Rheumatology appointment is scheduled on 10/6/2020  Do you want him to arrange an appointment to discuss pain management?

## 2021-06-10 NOTE — PROGRESS NOTES
"Abdi Joseph is a 55 y.o. male who is being evaluated via a billable telephone visit.      The patient has been notified of following:     \"This telephone visit will be conducted via a call between you and your physician/provider. We have found that certain health care needs can be provided without the need for a physical exam.  This service lets us provide the care you need with a short phone conversation.  If a prescription is necessary we can send it directly to your pharmacy.  If lab work is needed we can place an order for that and you can then stop by our lab to have the test done at a later time.    Telephone visits are billed at different rates depending on your insurance coverage. During this emergency period, for some insurers they may be billed the same as an in-person visit.  Please reach out to your insurance provider with any questions.    If during the course of the call the physician/provider feels a telephone visit is not appropriate, you will not be charged for this service.\"    Patient has given verbal consent to a Telephone visit? Yes    What phone number would you like to be contacted at? [phone]    Patient would like to receive their AVS by AVS Preference: Ken.    Additional provider notes:   Virtual visit completed.  Patient with complaints of hand pain.  Worse at night.  No significant improvement with tramadol.  Had used glucosamine and Tylenol Extra Strength in the past.  Has upcoming appointment with Dr. Sullivan October 6, 2020.  Patient wondering what else he can do.  Does have wrist braces at home however not utilizing.  Patient with underlying type 1 diabetes.  Denies significant neck issues.  Known history of hypertension, hyperlipidemia and chronic kidney disease stage III.  Comprehensive review of systems as above otherwise all negative.      Assessment/Plan:    1. Paresthesia of hand, bilateral  Bilateral hand paresthesias question median nerve neuropathy.  Bilateral wrist " braces to be utilized at night with worsening symptoms typically overnight.  EMG bilateral upper extremities to determine if median nerve neuropathy present.  Patient currently utilizing gabapentin 400 mg at bedtime and will titrate up to 800 mg 3 times daily and use lowest effective dose.  Does have follow-up scheduled October 6, 2020 with Dr. Sullivan.  We will follow-up in this office October 14, 2020.  - EMG- Both Arms; Future  - gabapentin (NEURONTIN) 400 MG capsule; Take 2 capsules (800 mg total) by mouth 3 (three) times a day.  Dispense: 180 capsule; Refill: 2    2. Type 1 diabetes mellitus with ketoacidosis without coma (H)  Type 1 diabetes with prior A1c of 6.4% and continues use of Lantus 27 units daily plus mealtime insulin with Novolin insulin between 3 and 5 units.  Denies recent hypoglycemic episodes.    3. Essential hypertension, benign  Continue losartan 50 mg twice daily.    4. Anxiety  Patient does not feel that he is using Abilify.  Continues Seroquel 600 mg at bedtime along with Seroquel 50 mg morning and noon as needed.  Clonazepam half milligram in the morning +1-1/2 mg in the evening which help with anxiety symptoms as well.  Patient psychiatrist Dr. Álvarez is retiring in November and would like to have PCP take over med refills.        Phone call duration:  13 minutes    Fernando Hannon MD

## 2021-06-10 NOTE — TELEPHONE ENCOUNTER
Symptom  Describe your symptoms:   Hands achy, painful, throbbing, numbness and tingling.  Worse at night  Tramadol isn't helping.    Any pain:   yes PS 9    New/Ongoing:   Ongoing     How long have you been having symptoms:   3  Month(s)    Have you been seen for this:    Yes, Appt: 06/30/2020    Have your symptoms changed since this visit?:  Yes: Worsening    Appointment offered?:   Patient declines     Triage offered?:   Yes, declined     Home remedies tried:   Tyl.  Glucosamine.    Requested Pharmacy:   [id number] [address] AT [place] [address]      Okay to leave a detailed message?   Yes     Patient is requesting a new medication to help alleviate symptoms.    Please call patient with the plan of care.

## 2021-06-11 NOTE — TELEPHONE ENCOUNTER
Spoke with Abdi.  Recommended trying to spray spot with Flonase and let dry before applying sensor.  If this does not help.  Call back.

## 2021-06-11 NOTE — TELEPHONE ENCOUNTER
Breaks out in bubbly rash  Has been going on with last two sensors  Red bumpy itchy  Trying putting cream on and that helps some

## 2021-06-11 NOTE — PROGRESS NOTES
Patient presents at the request of Dr. Fernando Hannon for bilateral upper extremity EMG.  He has bilateral hand numbness and tingling digits 2-4 right equal to left in severity.  History of diabetes mellitus for 40 years plus or minus.  Has trouble playing guitar given his hand symptoms.  No muscle atrophy on exam of the upper extremities.  No hyperreflexia.    EMG/NCS  results: Please see scanned full report.    Comment NCS: Abnormal study:    1.  Absent right median SNAP and bilateral median transcarpal studies.  2.  Low amplitude right ulnar SNAP and bilateral radial SNAPs with borderline/mildly prolonged latencies.    3. Mildly prolonged latencies and low amplitude bilateral ulnar transcarpal studies.   4.  Prolonged right greater than left median CMAP latency and decreased amplitude and conduction velocity slowing.  5.  Normal distal latencies of the bilateral ulnar CMAPs with diffuse low amplitude and conduction velocity slowing    Comment EMG: Normal study.  1.  Normal needle EMG bilateral upper extremities.    Interpretation: Abnormal study: There is electrodiagnostic evidence of:    1.  Bilateral median neuropathy at the wrist consistent with entrapment in the carpal tunnel.  Right is worse than left.  Difficult to fully assess severity given what appears to be fairly significant polyneuropathy findings.  The median neuropathy severity is likely moderate  in the right upper extremity and mild to moderate in the left upper extremity.    2.  There electrodiagnostic findings suggestive of a sensorimotor peripheral polyneuropathy.  This would be consistent with his history of diabetes mellitus.     3.  There is no electrodiagnostic evidence of cervical radiculopathy, brachial plexopathy or ulnar neuropathy in the bilateral upper extremities.    Note: The patient will continue to wear his carpal tunnel splints at night and follow-up with primary care provider for further recommendations.    The testing was  completed in its entirety by the physician.      It was our pleasure caring for your patient today, if there any questions or concerns please do not hesitate to contact us.

## 2021-06-11 NOTE — TELEPHONE ENCOUNTER
Diabetic Sensor adhesive gives him a rash.      Does not go away.  Gets too bumpy then it will not register right.  Is there a cream he can use to heal the bumps?  He does rotate the site.    Pharmacy confirmed.      Leni Vale, RN  Triage Nurse Advisor

## 2021-06-11 NOTE — PATIENT INSTRUCTIONS - HE
Thank you for choosing the [facility] for your EMG testing.    The ordering provider will receive your final EMG results within the next few days.  Please follow up with your provider for the results and further treatment recommendations.

## 2021-06-11 NOTE — PROGRESS NOTES
"Outpatient Nutrition Assessment    Patient seen for out patient MNT inital assessment.    Subjective:  Abdi attended with his spouse, Grecia.  Abdi has a soft wrap on his right foot.  He reports that he has had diabetes since he was 12 years old.  His sister and brother also have diabetes.  He does not count carbohydrates yet is taking insulin based on his BG reading combined with his food intake.     Referring diagnosis: Type 1 Diabetes (250.01)    Height: 68\"  Most recent weight: 193# last clinic visit.    Nutrition intervention that addressed medical nutrition therapy for above diagnosis: Carbohydrate counting for diabetes.     RD reviewed carbohydrate counting with plate diagram and meal plan.  Written materials provided to patient.     Lifestyle changes made prior to nutrition session:  Abdi was recently hospitalized for DKA.  He is unsure of the precipitating factors.   Abdi is dealing with gastroparesis and missing teeth.      Assessment of diet/weight history:  Limited vegetable and fruit intake due to patient preference.      Assessment of physical activity:  Limited in exercise.  Was walking his dog though his dog recently passed away.      Goals:  1.  Eat protein for evening snack to satisfy hunger.  2.  Count carbohydrates.  He plans to work with his physician on insulin dosing based on carb intake.    3.  Exercise 5x/week as able.   4.  Try some different vegetables.  (You may like them)    Patient had no barriers to learning, verbalized understanding, and compliance is expected.    Phone number provided for questions. Recommended follow-up in  as needed.    Thank you for this consult. Call me at [phone] for questions.    "

## 2021-06-12 NOTE — TELEPHONE ENCOUNTER
Refill Approved    Rx renewed per Medication Renewal Policy. Medication was last renewed on 07/24/2019.  Last office visit was 08/20/2020 with PCP.    Shannon Barros, [facility] Triage/Med Refill 10/15/2020     Requested Prescriptions   Pending Prescriptions Disp Refills     pramipexole (MIRAPEX) 0.25 MG tablet 28 tablet 0     Sig: Take 2 tablets (0.5 mg total) by mouth at bedtime.       Parkinson's Meds I Refill Protocol Passed - 10/15/2020 11:24 AM        Passed - PCP or prescribing provider visit in past 6 months      Last office visit with prescriber/PCP: 6/30/2020 OR same dept: 6/30/2020 Fernando Hannon MD OR same specialty: 6/30/2020 Fernando Hannon MD Last physical: Visit date not found Last MTM visit: Visit date not found     Next appt within 3 mo: Visit date not found  Next physical within 3 mo: Visit date not found  Prescriber OR PCP: Fernando Hannon MD  Last diagnosis associated with med order: 1. Restless leg syndrome  - pramipexole (MIRAPEX) 0.25 MG tablet; Take 2 tablets (0.5 mg total) by mouth at bedtime.  Dispense: 28 tablet; Refill: 0    If protocol passes may refill for 6 months if within 3 months of last provider visit (or a total of 9 months).             Pramipexole/Ropinirole Refill Protocol Passed - 10/15/2020 11:24 AM        Passed - PCP or prescribing provider visit in past 6 months      Last office visit with prescriber/PCP: 6/30/2020 OR same dept: 6/30/2020 Fernando Hannon MD OR same specialty: 6/30/2020 Fernando Hannon MD Last physical: Visit date not found Last MTM visit: Visit date not found         Next appt within 3 mo: Visit date not found  Next physical within 3 mo: Visit date not found  Prescriber OR PCP: Fernando Hannon MD  Last diagnosis associated with med order: 1. Restless leg syndrome  - pramipexole (MIRAPEX) 0.25 MG tablet; Take 2 tablets (0.5 mg total) by mouth at bedtime.  Dispense: 28 tablet; Refill: 0     If protocol passes may refill for 6 months if  within 3 months of last provider visit (or a total of 9 months).

## 2021-06-12 NOTE — PROGRESS NOTES
ASSESSMENT AND PLAN:  Abdi Joseph 55 y.o. male is seen here on 10/06/20 for evaluation of polyarthralgia, with several signals raising question of if he has inflammatory joint disease affecting the hands, documented carpal tunnel from bilaterally with EMG, peripheral neuropathy felt secondary to Diabetes, sisters history of rheumatoid arthritis.  We discussed options.  Starting on prednisone 10 mg daily.  How this is going to affect his diabetes is discussed.  He is going to check with his primary physician for adjustment of his insulin dose.  We will meet here in 3 weeks.  Diagnoses and all orders for this visit:    Polyarthralgia  -     HM1(CBC and Differential)  -     Creatinine  -     ALT (SGPT)  -     Albumin  -     Rheumatoid Factor Quant  -     CCP Antibodies  -     Hepatitis C Antibody (Anti-HCV)  -     Uric Acid  -     Erythrocyte Sedimentation Rate  -     C-Reactive Protein  -     Antinuclear Antibody (LOREN) Cascade  -     XR Hands Bilateral 3 or More VWS; Future; Expected date: 10/06/2020  -     XR Hands Bilateral 3 or More VWS    Bilateral carpal tunnel syndrome      HISTORY OF PRESENTING ILLNESS:  Abdi Joseph, 55 y.o., male is here for evaluation of bilateral painful hands, numbness and tingling, 6 months in duration, waking him up from sleep at night, and have shaking his hands, he has background of diabetes and has had longstanding numbness of the feet.  He was seen at the spine clinic.  In September of this year.  He had an EMG.  Besides documenting peripheral neuropathy sensorimotor bilateral carpal tunnel was also found.  He has tried a brace that has not helped.  He is known to have diabetes with current intensive management he is doing so much better.  His sister has rheumatoid arthritis.  He noted no other joint areas about the same except his right shoulder which is troublesome intermittently.  There is no personal family history of psoriasis ulcerative colitis Crohn's disease.  PsO, IBD,  Ank Spond, RA, SLE.  Further historical information and ADL limitations as noted in the multidimensional health assessment questionnaire attached in the EMR. Rest of the 13 system ROS is negative.     ALLERGIES:Linezolid, Metoclopramide, and Prochlorperazine edisylate    PAST MEDICAL/ACTIVE PROBLEMS/MEDICATION/ FAMILY HISTORY/SOCIAL DATA:  The patient has a family history of  Past Medical History:   Diagnosis Date     Anxiety      Arthritis      Cellulitis      Chronic Kidney Disease, Stage 3      Depression      Diabetes mellitus (H)      DKA (diabetic ketoacidoses) (H)      Gastroparesis      Hypertension      Hyponatremia      Hypothyroidism      MRSA (methicillin resistant Staphylococcus aureus)      Neuropathy      Osteoarthritis Of The Knee      Panic disorder      PONV (postoperative nausea and vomiting)      Retinopathy due to secondary diabetes (H)      Thrombosis of superior sagittal sinus      Social History     Tobacco Use   Smoking Status Never Smoker   Smokeless Tobacco Never Used     Patient Active Problem List   Diagnosis     Acute Colitis     Dehydration (Na, H2O)     Diabetic Ulcer Of The Left Foot     Cellulitis     Cellulitis Of The Left Foot     Hypothyroidism, unspecified type     Hyponatremia     Anemia     JENI (generalized anxiety disorder)     Chronic Kidney Disease, Stage 3     Osteoarthritis Of The Knee     Type 1 diabetes mellitus with ketoacidosis (H)     Pneumonia     Abdominal Pain     Intractable vomiting     Hyperkalemia     Hyponatremia     Hypopyon ulcer     Acute-on-chronic kidney injury (H)     MRSA infection     Hypomagnesemia     Hypertension     Thrombosis, superior sagittal sinus     Cellulitis of right foot     Hyperglycemia     Acute kidney injury (H)     Diabetic ketoacidosis without coma associated with type 1 diabetes mellitus (H)     Lactic acidosis     Foot ulcer, right, with unspecified severity (H)     Gastrointestinal hemorrhage, unspecified gastrointestinal  hemorrhage type     Cellulitis     Osteomyelitis of right foot (H)     ALBERTA (acute kidney injury) (H)     Hyperlipidemia, unspecified hyperlipidemia type     Essential hypertension, benign     Type 1 diabetes mellitus with kidney complication (H)     Acute gastroenteritis     Hospital discharge follow-up     Viral gastroenteritis     Hypoglycemia     Influenza A     Panic disorder     Depression     Anxiety     CKD (chronic kidney disease) stage 3, GFR 30-59 ml/min     Pneumonia due to infectious organism     Blood clots in brain     Diarrhea     Frequent falls     Gastroparesis     Localized edema     Major depressive disorder, recurrent episode, moderate (H)     Myoclonic jerking     Tremor     Optic neuritis     Polypharmacy     Septic arthritis (H)     DJD (degenerative joint disease) of knee     Current Outpatient Medications   Medication Sig Dispense Refill     aspirin 81 MG EC tablet Take 81 mg by mouth daily.       blood-glucose sensor (DEXCOM G6 SENSOR) Belia Use as directed for continuous glucose monitoring. 1 Device 13     blood-glucose transmitter (DEXCOM G6 TRANSMITTER) Belia Use as directed for continuous glucose monitoring. Change every 3 months 1 Device 4     calcium, as carbonate, (TUMS) 200 mg calcium (500 mg) chewable tablet Chew 1 tablet 3 (three) times a day as needed for heartburn.       clonazePAM (KLONOPIN) 0.5 MG tablet Take 0.5 mg by mouth daily before breakfast.       clonazePAM (KLONOPIN) 0.5 MG tablet Take 1.5 mg by mouth at bedtime.       docusate sodium (COLACE) 100 MG capsule Take 100 mg by mouth 2 (two) times a day.       gabapentin (NEURONTIN) 400 MG capsule Take 2 capsules (800 mg total) by mouth 3 (three) times a day. 180 capsule 2     insulin glargine (LANTUS SOLOSTAR U-100 INSULIN) 100 unit/mL (3 mL) pen Inject 27 Units under the skin at bedtime. 25 mL 3     insulin syringe-needle U-100 0.3 mL 29 gauge Syrg USE ONCE DAILY 100 each 0     losartan (COZAAR) 50 MG tablet Take 1 tablet  "(50 mg total) by mouth 2 (two) times a day. 180 tablet 3     metoprolol tartrate (LOPRESSOR) 100 MG tablet Take 1 tablet (100 mg total) by mouth 2 (two) times a day. 60 tablet 5     NOVOLIN R REGULAR U-100 INSULN 100 unit/mL injection Check blood sugar four (4) times daily.  10.65 Type 1 with hyperglycemia  No supplies needed  BD Ultra-fine 6 mm 31G 0.3 mL syringe - NDC [address]-27 - dispense 1 case, refill PRN for 1 year (Patient taking differently: Inject 2-4 Units under the skin 3 (three) times a day before meals. Check blood sugar four (4) times daily.  10.65 Type 1 with hyperglycemia  No supplies needed  BD Ultra-fine 6 mm 31G 0.3 mL syringe - NDC [address]-79 - dispense 1 case, refill PRN for 1 year      ) 10 mL PRN     ondansetron (ZOFRAN) 4 MG tablet TAKE 1 TABLET (4 MG TOTAL) BY MOUTH EVERY 8 (EIGHT) HOURS AS NEEDED FOR NAUSEA. 20 tablet 3     pantoprazole (PROTONIX) 40 MG tablet TAKE 1 TABLET(40 MG) BY MOUTH TWICE DAILY 180 tablet 3     pen needle, diabetic (BD ULTRA-FINE VICTORIANO PEN NEEDLE) 32 gauge x 5/32\" Ndle Use daily with insulin pen 100 each 6     QUEtiapine (SEROQUEL) 300 MG tablet Take 600 mg by mouth bedtime.       QUEtiapine (SEROQUEL) 50 MG tablet Take 50 mg by mouth daily. Take with breakfast and lunch.       sildenafil (VIAGRA) 100 MG tablet Take 1 tablet (100 mg total) by mouth as needed for erectile dysfunction. 30 tablet 2     simvastatin (ZOCOR) 20 MG tablet TAKE 1 TABLET(20 MG) BY MOUTH AT BEDTIME 90 tablet 3     traZODone (DESYREL) 50 MG tablet Take 1 tablet (50 mg total) by mouth at bedtime. (Patient taking differently: Take 100 mg by mouth at bedtime. ) 30 tablet 0     acetaminophen (TYLENOL) 500 MG tablet Take 1-2 tablets (500-1,000 mg total) by mouth every 4 (four) hours as needed.  0     pramipexole (MIRAPEX) 0.25 MG tablet Take 2 tablets (0.5 mg total) by mouth at bedtime. 28 tablet 0     traMADoL (ULTRAM) 50 mg tablet Take 1 tablet (50 mg total) by mouth every 6 (six) hours as " "needed for pain. 20 tablet 0     No current facility-administered medications for this visit.      Facility-Administered Medications Ordered in Other Visits   Medication Dose Route Frequency Provider Last Rate Last Dose     bupivacaine-epinephrine (PF) 0.75 %-1:200,000 1.8 mL, EPINEPHrine 0.01 mL, fentaNYL pf 50 mcg/mL 0.5 mL    Continuous PRN WILLIAM Starkey MBBS   2.31 mL at 09/09/14 0728       COMPREHENSIVE EXAMINATION:  Vitals:    10/06/20 1117   BP: 132/62   Patient Site: Right Arm   Patient Position: Sitting   Cuff Size: Adult Large   Pulse: 80   Weight: 216 lb (98 kg)   Height: 5' 9\" (1.753 m)     A well appearing alert oriented male. Vital data as noted above. His eyes without inflammation/scleromalacia. ENTwithout oral mucositis, thrush, nasal deformity, external ear redness, deformity. His neck is without lymphadenopathy and supple. Lungs normal sounds, no pleural rub. Heart auscultation normal rate, rhythm; no pericardial rub and murmurs. Abdomen soft, non tender, no organomegaly. Skin examined for heliotrope, malar area eruption, lupus pernio, periungual erythema, sclerodactyly, papules, erythema nodosum, purpura, nail pitting, onycholysis, and obvious psoriasis lesion. Neurological examination shows normal alertness, speech, facial symmetry, tone and power in upper and lower extremities. The joint examination is performed for swelling, tenderness, warmth, erythema, and range of motion in the following joints: DIPs, PIPs, MCPs, wrists, first CMC's, elbows, shoulders, hips, knees, ankles, feet; spine for range of motion and paraspinal muscles for tenderness. The salient  findings are: He has tenderness in multiple MCPs more so on his left hand, 2 4 and 5 the right 2 and 5, he has stigmata of injury on his left ring finger work-related.  Tenderness and subtle swelling of the wrist bilaterally.  He has impingement of the right shoulder in abduction and internal rotation.  He has a briskly positive " Tinel on both sides at the wrists.    LAB / IMAGING DATA:  ALT   Date Value Ref Range Status   02/22/2020 17 0 - 45 U/L Final   11/19/2019 13 0 - 45 U/L Final   02/26/2019 17 0 - 45 U/L Final     Albumin   Date Value Ref Range Status   02/22/2020 3.9 3.5 - 5.0 g/dL Final   11/19/2019 3.5 3.5 - 5.0 g/dL Final   02/26/2019 3.2 (L) 3.5 - 5.0 g/dL Final     Creatinine   Date Value Ref Range Status   06/30/2020 2.69 (H) 0.70 - 1.30 mg/dL Final   03/04/2020 1.90 (H) 0.70 - 1.30 mg/dL Final   02/25/2020 1.59 (H) 0.70 - 1.30 mg/dL Final       WBC   Date Value Ref Range Status   02/25/2020 5.0 4.0 - 11.0 thou/uL Final   02/24/2020 5.9 4.0 - 11.0 thou/uL Final   06/13/2015 3.1 (L) 4.0 - 11.0 thou/uL Final   06/12/2015 3.3 (L) 4.0 - 11.0 thou/uL Final     Hemoglobin   Date Value Ref Range Status   02/25/2020 12.3 (L) 14.0 - 18.0 g/dL Final   02/24/2020 12.5 (L) 14.0 - 18.0 g/dL Final   02/23/2020 13.2 (L) 14.0 - 18.0 g/dL Final     Platelets   Date Value Ref Range Status   02/25/2020 159 140 - 440 thou/uL Final   02/24/2020 146 140 - 440 thou/uL Final   02/23/2020 145 140 - 440 thou/uL Final       Lab Results   Component Value Date    SEDRATE 53 (H) 11/02/2017

## 2021-06-12 NOTE — TELEPHONE ENCOUNTER
FYI - Status Update  Who is Calling: Patient  Update: Requesting refill be expedited. Patient has been out of medication for two days.  Okay to leave a detailed message?:  No return call needed

## 2021-06-12 NOTE — TELEPHONE ENCOUNTER
Patient Returning Call  Reason for call:  Returning call  Information relayed to patient:    Relayed below message to patient  Patient has additional questions:  No  If YES, what are your questions/concerns:    No additional questions at this time.  Okay to leave a detailed message?: Yes

## 2021-06-12 NOTE — TELEPHONE ENCOUNTER
Spoke with Dr. Hannon who would recommend patient increase his Lantus to 30 units daily while taking prednisone then decrease back to 27 units once he completes prednisone. Patient should call with any blood sugars 300 or higher.

## 2021-06-12 NOTE — TELEPHONE ENCOUNTER
10/07 - Please call patient back for an update on this @ [phone]      Pt is also asking for a refill:  - pramipexole (MIRAPEX) 0.25 MG tablet    Please send to:   - [id number] - [address] - [facility] [address] AT [place] [id number]

## 2021-06-12 NOTE — TELEPHONE ENCOUNTER
Medication Question or Clarification  Who is calling: patient   What medication are you calling about (include dose and sig)?:   predniSONE (DELTASONE) 10 mg tablet 21 tablet 0 10/6/2020 10/27/2020    Sig - Route: Take 10 mg by mouth daily for 21 days. - Oral    Sent to pharmacy as: predniSONE 10 mg tablet (DELTASONE)    E-Prescribing Status: Receipt confirmed by pharmacy (10/6/2020 11:47 AM CDT        Who prescribed the medication?: Olinda Sullivan MBBS  What is your question/concern?: Last time I was on this medication my blood sugar got real high and I was given NPH insulin. Should I be given this again? Please advise as the NPH was very helpful.   Requested Pharmacy: [facility] to leave a detailed message?: Yes

## 2021-06-13 NOTE — ANESTHESIA PREPROCEDURE EVALUATION
Anesthesia Evaluation      Patient summary reviewed   History of anesthetic complications (PONV)     Airway   Mallampati: II  Neck ROM: full   Pulmonary - negative ROS and normal exam    breath sounds clear to auscultation                         Cardiovascular - normal exam  (+) hypertension, ,     ECG reviewed  Rhythm: regular  Rate: normal,         Neuro/Psych    (+) depression, anxiety/panic attacks,     Endo/Other    (+) diabetes mellitus type 1 poorly controlled using insulin, hypothyroidism,      GI/Hepatic/Renal    (+) GERD,   chronic renal disease CRI,           Dental    (+) poor dentition                       Anesthesia Plan  Planned anesthetic: MAC  - low dose propofol infusion for sedation (25mcg/kg/min)  - zofran and decadron (4mg) for nausea  - recheck glucose postop    ASA 3   Induction: intravenous   Anesthetic plan and risks discussed with: patient    Post-op plan: routine recovery

## 2021-06-13 NOTE — ANESTHESIA CARE TRANSFER NOTE
Last vitals:   Vitals:    11/03/17 1543   BP: 120/70   Pulse: 70   Resp: 16   Temp:    SpO2: 95%     Patient's level of consciousness is awake  Spontaneous respirations: yes  Maintains airway independently: yes  Dentition unchanged: yes  Oropharynx: oropharynx clear of all foreign objects    QCDR Measures:  ASA# 20 - Surgical Safety Checklist: WHO surgical safety checklist completed prior to induction  PQRS# 430 - Adult PONV Prevention: 4558F - Pt received => 2 anti-emetic agents (different classes) preop & intraop  ASA# 8 - Peds PONV Prevention: NA - Not pediatric patient, not GA or 2 or more risk factors NOT present  PQRS# 424 - Berta-op Temp Management: NA - MAC anesthesia or case < 60 minutes  PQRS# 426 - PACU Transfer Protocol: - Transfer of care checklist used  ASA# 14 - Acute Post-op Pain: ASA14B - Patient did NOT experience pain >= 7 out of 10

## 2021-06-13 NOTE — PROGRESS NOTES
ASSESSMENT AND PLAN:  Abdi Joseph 55 y.o. male is seen here on 11/11/20 is here for follow-up.  He has ample evidence now of inflammatory polyarthritis affecting his hands predominantly, his LOREN was positive, double-stranded DNA negative, rheumatoid factor, anti-CCP were negative, he has renal impairment.  We discussed options.  Is going to start methotrexate low-dose in view of the renal impairment.  Low-dose prednisone.  I will meet here in 2 months labs every 4 weeks.       Diagnoses and all orders for this visit:    Inflammatory polyarthritis (H)  -     methotrexate 2.5 MG tablet; Take 4 tablets (10 mg total) by mouth once a week.  Dispense: 18 tablet; Refill: 1  -     predniSONE (DELTASONE) 2.5 MG tablet; Take 7.5 mg/d prednisone PO for 1 month, reduce to 5 mg/d for the next month, and then reduce to 2.5 mg/d for the third month and then stop..  Dispense: 90 tablet; Refill: 2  -     folic acid (FOLVITE) 1 MG tablet; Take 1 tablet (1 mg total) by mouth daily.  Dispense: 30 tablet; Refill: 11    LOREN positive    Stage 3b chronic kidney disease    High risk medication use  -     ALT (SGPT); Standing  -     Albumin; Standing  -     Creatinine; Standing  -     HM2(CBC w/o Differential); Standing      HISTORY OF PRESENTING ILLNESS:  Abdi Joseph, 55 y.o., male is here for follow-up of polyarthralgias with ample evidence of inflammatory joint disease affecting predominantly his hands.  With prednisone he noticed substantial improvement.  This happened within 5 days.  During the time that he was on it he was able to sleep better.  The stiffness is improved.  After he stopped the prednisone for having ran out of the course of prednisone that was prescribed he is hands and fingers started to hurt again in about 3 days time.  The symptoms as noted before had gone on for 6 months in duration, waking him up from sleep at night, and have shaking his hands, he has background of diabetes and has had longstanding numbness  of the feet.  He was seen at the spine clinic.  In September of this year.  He had an EMG.  Besides documenting peripheral neuropathy sensorimotor bilateral carpal tunnel was also found.  He has tried a brace that has not helped.  He is known to have diabetes with current intensive management he is doing so much better.  His sister has rheumatoid arthritis.  He noted no other joint areas about the same except his right shoulder which is troublesome intermittently.  There is no personal family history of psoriasis ulcerative colitis Crohn's disease.   ALLERGIES:Linezolid, Metoclopramide, and Prochlorperazine edisylate    PAST MEDICAL/ACTIVE PROBLEMS/MEDICATION/ FAMILY HISTORY/SOCIAL DATA:  The patient has a family history of  Past Medical History:   Diagnosis Date     Anxiety      Arthritis      Cellulitis      Chronic Kidney Disease, Stage 3      Depression      Diabetes mellitus (H)      DKA (diabetic ketoacidoses) (H)      Gastroparesis      Hypertension      Hyponatremia      Hypothyroidism      MRSA (methicillin resistant Staphylococcus aureus)      Neuropathy      Osteoarthritis Of The Knee      Panic disorder      PONV (postoperative nausea and vomiting)      Retinopathy due to secondary diabetes (H)      Thrombosis of superior sagittal sinus      Social History     Tobacco Use   Smoking Status Never Smoker   Smokeless Tobacco Never Used     Patient Active Problem List   Diagnosis     Acute Colitis     Dehydration (Na, H2O)     Diabetic Ulcer Of The Left Foot     Cellulitis     Cellulitis Of The Left Foot     Hypothyroidism, unspecified type     Hyponatremia     Anemia     JENI (generalized anxiety disorder)     Chronic Kidney Disease, Stage 3     Osteoarthritis Of The Knee     Type 1 diabetes mellitus with ketoacidosis (H)     Pneumonia     Abdominal Pain     Intractable vomiting     Hyperkalemia     Hyponatremia     Hypopyon ulcer     Acute-on-chronic kidney injury (H)     MRSA infection     Hypomagnesemia      Hypertension     Thrombosis, superior sagittal sinus     Cellulitis of right foot     Hyperglycemia     Acute kidney injury (H)     Diabetic ketoacidosis without coma associated with type 1 diabetes mellitus (H)     Lactic acidosis     Foot ulcer, right, with unspecified severity (H)     Gastrointestinal hemorrhage, unspecified gastrointestinal hemorrhage type     Cellulitis     Osteomyelitis of right foot (H)     ALBERTA (acute kidney injury) (H)     Hyperlipidemia, unspecified hyperlipidemia type     Essential hypertension, benign     Type 1 diabetes mellitus with kidney complication (H)     Acute gastroenteritis     Hospital discharge follow-up     Viral gastroenteritis     Hypoglycemia     Influenza A     Panic disorder     Depression     Anxiety     CKD (chronic kidney disease) stage 3, GFR 30-59 ml/min     Pneumonia due to infectious organism     Blood clots in brain     Diarrhea     Frequent falls     Gastroparesis     Localized edema     Major depressive disorder, recurrent episode, moderate (H)     Myoclonic jerking     Tremor     Optic neuritis     Polypharmacy     Septic arthritis (H)     DJD (degenerative joint disease) of knee     Current Outpatient Medications   Medication Sig Dispense Refill     acetaminophen (TYLENOL) 500 MG tablet Take 1-2 tablets (500-1,000 mg total) by mouth every 4 (four) hours as needed.  0     aspirin 81 MG EC tablet Take 81 mg by mouth daily.       blood-glucose sensor (DEXCOM G6 SENSOR) Belia Use as directed for continuous glucose monitoring. 1 Device 13     blood-glucose transmitter (DEXCOM G6 TRANSMITTER) Belia Use as directed for continuous glucose monitoring. Change every 3 months 1 Device 4     calcium, as carbonate, (TUMS) 200 mg calcium (500 mg) chewable tablet Chew 1 tablet 3 (three) times a day as needed for heartburn.       clonazePAM (KLONOPIN) 0.5 MG tablet Take 0.5 mg by mouth daily before breakfast.       clonazePAM (KLONOPIN) 0.5 MG tablet Take 1.5 mg by mouth at  "bedtime.       docusate sodium (COLACE) 100 MG capsule Take 100 mg by mouth 2 (two) times a day.       gabapentin (NEURONTIN) 400 MG capsule Take 2 capsules (800 mg total) by mouth 3 (three) times a day. 180 capsule 2     insulin glargine (LANTUS SOLOSTAR U-100 INSULIN) 100 unit/mL (3 mL) pen Inject 27 Units under the skin at bedtime. 25 mL 3     insulin syringe-needle U-100 0.3 mL 29 gauge Syrg USE ONCE DAILY 100 each 0     losartan (COZAAR) 50 MG tablet Take 1 tablet (50 mg total) by mouth 2 (two) times a day. 180 tablet 3     metoprolol tartrate (LOPRESSOR) 100 MG tablet Take 1 tablet (100 mg total) by mouth 2 (two) times a day. 60 tablet 5     NOVOLIN R REGULAR U-100 INSULN 100 unit/mL injection Check blood sugar four (4) times daily.  10.65 Type 1 with hyperglycemia  No supplies needed  BD Ultra-fine 6 mm 31G 0.3 mL syringe - NDC [address]-92 - dispense 1 case, refill PRN for 1 year (Patient taking differently: Inject 2-4 Units under the skin 3 (three) times a day before meals. Check blood sugar four (4) times daily.  10.65 Type 1 with hyperglycemia  No supplies needed  BD Ultra-fine 6 mm 31G 0.3 mL syringe - NDC [address]-77 - dispense 1 case, refill PRN for 1 year      ) 10 mL PRN     ondansetron (ZOFRAN) 4 MG tablet TAKE 1 TABLET (4 MG TOTAL) BY MOUTH EVERY 8 (EIGHT) HOURS AS NEEDED FOR NAUSEA. 20 tablet 3     pantoprazole (PROTONIX) 40 MG tablet TAKE 1 TABLET(40 MG) BY MOUTH TWICE DAILY 180 tablet 3     pen needle, diabetic (BD ULTRA-FINE VICTORIANO PEN NEEDLE) 32 gauge x 5/32\" Ndle Use daily with insulin pen 100 each 6     pramipexole (MIRAPEX) 0.25 MG tablet Take 2 tablets (0.5 mg total) by mouth at bedtime. 28 tablet 4     QUEtiapine (SEROQUEL) 300 MG tablet Take 600 mg by mouth bedtime.       QUEtiapine (SEROQUEL) 50 MG tablet Take 50 mg by mouth daily. Take with breakfast and lunch.       sildenafil (VIAGRA) 100 MG tablet Take 1 tablet (100 mg total) by mouth as needed for erectile dysfunction. 30 tablet 2 "     simvastatin (ZOCOR) 20 MG tablet TAKE 1 TABLET(20 MG) BY MOUTH AT BEDTIME 90 tablet 3     traMADoL (ULTRAM) 50 mg tablet Take 1 tablet (50 mg total) by mouth every 6 (six) hours as needed for pain. 20 tablet 0     traZODone (DESYREL) 50 MG tablet Take 1 tablet (50 mg total) by mouth at bedtime. (Patient taking differently: Take 100 mg by mouth at bedtime. ) 30 tablet 0     No current facility-administered medications for this visit.      Facility-Administered Medications Ordered in Other Visits   Medication Dose Route Frequency Provider Last Rate Last Dose     bupivacaine-epinephrine (PF) 0.75 %-1:200,000 1.8 mL, EPINEPHrine 0.01 mL, fentaNYL pf 50 mcg/mL 0.5 mL    Continuous PRN WILLIAM Starkey MBBS   2.31 mL at 09/09/14 0728     DETAILED EXAMINATION  11/11/20  :  Vitals:    11/11/20 1251   BP: 120/72   Patient Site: Right Arm   Patient Position: Sitting   Cuff Size: Adult Large   Pulse: 88   Weight: 216 lb (98 kg)     Alert oriented. Head including the face is examined for malar rash, heliotropes, scarring, lupus pernio. Eyes examined for redness such as in episcleritis/scleritis, periorbital lesions.   Neck/ Face examined for parotid gland swelling, range of motion of neck.  Left upper and lower and right upper and lower extremities examined for tenderness, swelling, warmth of the appendicular joints, range of motion, edema, rash.  Some of the important findings included: he does not have evidence of synovitis in the palpable joints of the upper extremities.  No significant deformities of the digits.  Right knee TKA.  .            LAB / IMAGING DATA:  ALT   Date Value Ref Range Status   10/06/2020 14 0 - 45 U/L Final   02/22/2020 17 0 - 45 U/L Final   11/19/2019 13 0 - 45 U/L Final     Albumin   Date Value Ref Range Status   10/06/2020 3.6 3.5 - 5.0 g/dL Final   02/22/2020 3.9 3.5 - 5.0 g/dL Final   11/19/2019 3.5 3.5 - 5.0 g/dL Final     Creatinine   Date Value Ref Range Status   10/06/2020 1.97 (H)  0.70 - 1.30 mg/dL Final   06/30/2020 2.69 (H) 0.70 - 1.30 mg/dL Final   03/04/2020 1.90 (H) 0.70 - 1.30 mg/dL Final       WBC   Date Value Ref Range Status   10/06/2020 4.6 4.0 - 11.0 thou/uL Final   02/25/2020 5.0 4.0 - 11.0 thou/uL Final   06/13/2015 3.1 (L) 4.0 - 11.0 thou/uL Final   06/12/2015 3.3 (L) 4.0 - 11.0 thou/uL Final     Hemoglobin   Date Value Ref Range Status   10/06/2020 12.8 (L) 14.0 - 18.0 g/dL Final   02/25/2020 12.3 (L) 14.0 - 18.0 g/dL Final   02/24/2020 12.5 (L) 14.0 - 18.0 g/dL Final     Platelets   Date Value Ref Range Status   10/06/2020 133 (L) 140 - 440 thou/uL Final   02/25/2020 159 140 - 440 thou/uL Final   02/24/2020 146 140 - 440 thou/uL Final       Lab Results   Component Value Date    RF <15.0 10/06/2020    SEDRATE 8 10/06/2020

## 2021-06-13 NOTE — TELEPHONE ENCOUNTER
Refill Approved    Rx renewed per Medication Renewal Policy. Medication was last renewed on 11/19/19.    Cynthia Mckenna, [facility] Triage/Med Refill 12/22/2020     Requested Prescriptions   Pending Prescriptions Disp Refills     metoprolol tartrate (LOPRESSOR) 100 MG tablet 60 tablet 5     Sig: Take 1 tablet (100 mg total) by mouth 2 (two) times a day.       Beta-Blockers Refill Protocol Passed - 12/22/2020 12:49 PM        Passed - PCP or prescribing provider visit in past 12 months or next 3 months     Last office visit with prescriber/PCP: 12/2/2020 Fernando Hannon MD OR same dept: 12/2/2020 Fernando Hannon MD OR same specialty: 12/2/2020 Fernando Hannon MD  Last physical: 11/19/2019 Last MTM visit: Visit date not found   Next visit within 3 mo: Visit date not found  Next physical within 3 mo: Visit date not found  Prescriber OR PCP: Fernando Hannon MD  Last diagnosis associated with med order: 1. Essential hypertension, benign  - metoprolol tartrate (LOPRESSOR) 100 MG tablet; Take 1 tablet (100 mg total) by mouth 2 (two) times a day.  Dispense: 60 tablet; Refill: 5    If protocol passes may refill for 12 months if within 3 months of last provider visit (or a total of 15 months).             Passed - Blood pressure filed in past 12 months     BP Readings from Last 1 Encounters:   12/02/20 136/68

## 2021-06-13 NOTE — ANESTHESIA POSTPROCEDURE EVALUATION
Patient: Abdi Joseph  IRRIGATION AND DEBRIDEMENT RIGHT FOOT,  amputation of right hallux, Sesamoid and partial 1st metatarsal  Anesthesia type: MAC    Patient location: Phase II Recovery  Last vitals:   Vitals:    11/03/17 1543   BP: 120/70   Pulse: 70   Resp: 16   Temp:    SpO2: 95%     Post vital signs: stable  Level of consciousness: awake and responds to simple questions  Post-anesthesia pain: pain controlled  Post-anesthesia nausea and vomiting: no  Pulmonary: unassisted, return to baseline  Cardiovascular: stable and blood pressure at baseline  Hydration: adequate  Anesthetic events: no    QCDR Measures:  ASA# 11 - Berta-op Cardiac Arrest: ASA11B - Patient did NOT experience unanticipated cardiac arrest  ASA# 12 - Berta-op Mortality Rate: ASA12B - Patient did NOT die  ASA# 13 - PACU Re-Intubation Rate: NA - No ETT / LMA used for case  ASA# 10 - Composite Anes Safety: ASA10A - No serious adverse event    Additional Notes:

## 2021-06-13 NOTE — TELEPHONE ENCOUNTER
Pharmacy states patient is out of metoprolol 100mg tablets and they have already given him an emergency supply.  Last prescription was written 11/19/19.    Janel Arenas RN  [facility] Triage Nurse Advisor    Please close encounter when completed.

## 2021-06-13 NOTE — PROGRESS NOTES
Assessment/Plan:    1. Type 1 diabetes mellitus with microalbuminuria (H)  Type 1 diabetes good control despite current prednisone use with A1c increasing slightly from 6.4% up to 6.8% however ensure avoidance of hypoglycemia currently with described illnesses as noted below.  - Glycosylated Hemoglobin A1c  - Symptomatic COVID-19 Virus (CORONAVIRUS) PCR; Future  - Comprehensive Metabolic Panel  - Symptomatic COVID-19 Virus (CORONAVIRUS) PCR    2. Non-intractable vomiting with nausea, unspecified vomiting type  Nausea with vomiting.  Question gastroenteritis etiology.  COVID-19 PCR was completed today.  Will notify with results.  Ensure adequate hydration.  Lab assessment completed to ensure no evidence for ketoacidosis etc.  Zofran ODT as directed as needed for nausea.  - Comprehensive Metabolic Panel  - HM2(CBC w/o Differential)  - Lipase  - ondansetron (ZOFRAN-ODT) 4 MG disintegrating tablet; Take 1 tablet (4 mg total) by mouth every 8 (eight) hours as needed for nausea.  Dispense: 20 tablet; Refill: 1    3. Essential hypertension, benign  Hypertension well-controlled.  - Comprehensive Metabolic Panel    4. Stage 3b chronic kidney disease  History of CKD stage IIIb and will ensure stable renal function with likely component of prerenal azotemia currently associated with episodes of nausea and vomiting x4 over past 3 days.  - Comprehensive Metabolic Panel    PHQ-9 questionnaire 11 out of 27 with JENI-7 questionnaire 11 out of 21.          Subjective:    Abdi LYONS Jake is seen today for follow-up evaluation.  Type 1 diabetes.  Prior A1c of 6.4%.  Currently on prednisone associated with bilateral hand pain.  Increase Lantus insulin from 27 units up to 30 units daily.  Blood sugar was low in the 40s this morning.  Novolin insulin between 3 and 5 units with meals.  Followed by Dr. Sullivan rheumatologist.  Would like to stop prednisone if possible and perhaps consider corticosteroid injections etc.  Did have Thanksgiving  "celebration on Friday.  Grandchild with achiness described.  Patient developed concerns for nausea with vomiting beginning .  Symptoms were past 3 days with perhaps 4 total episodes.  Trying to avoid Gatorade with history of CKD described stage IIIb.  Prior microalbumin level elevation with protein 629.9.  Denies pancreatitis issues.  No bloating.  No headache.  No fever.  No cough.  No shortness of breath.  Comprehensive review of systems as above otherwise all negative.     - Grecia  Tobacco: no  EtOH: Bacardi Diet Coke a couple times per week with his wife (\"not a big drinker\")  Mom - alive 89  Dad -   Siblings: Juan Domínguez  Surgeries: right great toe amputation  Hospitalizations: osteomyelitis; \"lots of staph and MRSA infections\"; right TKA  Work: disability    Dr. Álvarez - psychiatrist  Dr. Jesus Dos Santos - podiatrist  Dr. Menezes - internal medicine (followed x 30 years)    Past Surgical History:   Procedure Laterality Date     ABDOMINAL SURGERY       CHOLECYSTECTOMY       FOOT AMPUTATION Right 11/3/2017    Procedure:  amputation of right hallux, Sesamoid and partial 1st metatarsal;  Surgeon: Lamin Barnett DPM;  Location: [facility] [place];  Service:      KNEE ARTHROSCOPY      x3     refractory surgery       TYMPANOPLASTY W/ MASTOIDECTOMY          Family History   Problem Relation Age of Onset     Heart disease Mother      Heart disease Father      Heart attack Father      Asthma Father      Diabetes Sister      Diabetes Brother      Heart attack Brother         Past Medical History:   Diagnosis Date     Anxiety      Arthritis      Cellulitis      Chronic Kidney Disease, Stage 3      Depression      Diabetes mellitus (H)      DKA (diabetic ketoacidoses) (H)      Gastroparesis      Hypertension      Hyponatremia      Hypothyroidism      MRSA (methicillin resistant Staphylococcus aureus)      Neuropathy      Osteoarthritis Of The Knee      Panic disorder      PONV (postoperative nausea " and vomiting)      Retinopathy due to secondary diabetes (H)      Thrombosis of superior sagittal sinus         Social History     Tobacco Use     Smoking status: Never Smoker     Smokeless tobacco: Never Used   Substance Use Topics     Alcohol use: Yes     Alcohol/week: 4.0 standard drinks     Types: 4 Shots of liquor per week     Drug use: No        Current Outpatient Medications   Medication Sig Dispense Refill     acetaminophen (TYLENOL) 500 MG tablet Take 1-2 tablets (500-1,000 mg total) by mouth every 4 (four) hours as needed.  0     aspirin 81 MG EC tablet Take 81 mg by mouth daily.       blood-glucose sensor (DEXCOM G6 SENSOR) Belia Use as directed for continuous glucose monitoring. 1 Device 13     blood-glucose transmitter (DEXCOM G6 TRANSMITTER) Belia Use as directed for continuous glucose monitoring. Change every 3 months 1 Device 4     calcium, as carbonate, (TUMS) 200 mg calcium (500 mg) chewable tablet Chew 1 tablet 3 (three) times a day as needed for heartburn.       clonazePAM (KLONOPIN) 0.5 MG tablet Take 0.5 mg by mouth daily before breakfast.       clonazePAM (KLONOPIN) 0.5 MG tablet Take 1.5 mg by mouth at bedtime.       docusate sodium (COLACE) 100 MG capsule Take 100 mg by mouth 2 (two) times a day.       folic acid (FOLVITE) 1 MG tablet TAKE 1 TABLET(1 MG) BY MOUTH DAILY 90 tablet 3     gabapentin (NEURONTIN) 400 MG capsule Take 2 capsules (800 mg total) by mouth 3 (three) times a day. 180 capsule 2     insulin glargine (LANTUS SOLOSTAR U-100 INSULIN) 100 unit/mL (3 mL) pen Inject 27 Units under the skin at bedtime. 25 mL 3     insulin syringe-needle U-100 0.3 mL 29 gauge Syrg USE ONCE DAILY 100 each 1     losartan (COZAAR) 50 MG tablet Take 1 tablet (50 mg total) by mouth 2 (two) times a day. 180 tablet 3     methotrexate 2.5 MG tablet Take 4 tablets (10 mg total) by mouth once a week. 18 tablet 1     metoprolol tartrate (LOPRESSOR) 100 MG tablet Take 1 tablet (100 mg total) by mouth 2 (two)  "times a day. 60 tablet 5     NOVOLIN R REGULAR U-100 INSULN 100 unit/mL injection Check blood sugar four (4) times daily.  10.65 Type 1 with hyperglycemia  No supplies needed  BD Ultra-fine 6 mm 31G 0.3 mL syringe - NDC [address]-45 - dispense 1 case, refill PRN for 1 year (Patient taking differently: Inject 2-4 Units under the skin 3 (three) times a day before meals. Check blood sugar four (4) times daily.  10.65 Type 1 with hyperglycemia  No supplies needed  BD Ultra-fine 6 mm 31G 0.3 mL syringe - NDC [address]-48 - dispense 1 case, refill PRN for 1 year      ) 10 mL PRN     ondansetron (ZOFRAN) 4 MG tablet TAKE 1 TABLET (4 MG TOTAL) BY MOUTH EVERY 8 (EIGHT) HOURS AS NEEDED FOR NAUSEA. 20 tablet 3     pantoprazole (PROTONIX) 40 MG tablet TAKE 1 TABLET(40 MG) BY MOUTH TWICE DAILY 180 tablet 3     pen needle, diabetic (BD ULTRA-FINE VICTORIANO PEN NEEDLE) 32 gauge x 5/32\" Ndle Use daily with insulin pen 100 each 6     pramipexole (MIRAPEX) 0.25 MG tablet Take 2 tablets (0.5 mg total) by mouth at bedtime. 28 tablet 4     predniSONE (DELTASONE) 2.5 MG tablet Take 7.5 mg/d prednisone PO for 1 month, reduce to 5 mg/d for the next month, and then reduce to 2.5 mg/d for the third month and then stop.. 90 tablet 2     QUEtiapine (SEROQUEL) 300 MG tablet Take 600 mg by mouth bedtime.       QUEtiapine (SEROQUEL) 50 MG tablet Take 50 mg by mouth daily. Take with breakfast and lunch.       sildenafil (VIAGRA) 100 MG tablet Take 1 tablet (100 mg total) by mouth as needed for erectile dysfunction. 30 tablet 2     simvastatin (ZOCOR) 20 MG tablet TAKE 1 TABLET(20 MG) BY MOUTH AT BEDTIME 90 tablet 3     traZODone (DESYREL) 50 MG tablet Take 1 tablet (50 mg total) by mouth at bedtime. (Patient taking differently: Take 100 mg by mouth at bedtime. ) 30 tablet 0     ondansetron (ZOFRAN-ODT) 4 MG disintegrating tablet Take 1 tablet (4 mg total) by mouth every 8 (eight) hours as needed for nausea. 20 tablet 1     traMADoL (ULTRAM) 50 mg " tablet Take 1 tablet (50 mg total) by mouth every 6 (six) hours as needed for pain. 20 tablet 0     No current facility-administered medications for this visit.      Facility-Administered Medications Ordered in Other Visits   Medication Dose Route Frequency Provider Last Rate Last Admin     bupivacaine-epinephrine (PF) 0.75 %-1:200,000 1.8 mL, EPINEPHrine 0.01 mL, fentaNYL pf 50 mcg/mL 0.5 mL    Continuous PRN WILLIAM Starkey MBBS   2.31 mL at 09/09/14 0728          Objective:    Vitals:    12/02/20 1012   BP: 136/68   Patient Site: Left Arm   Patient Position: Sitting   Cuff Size: Adult Large   Pulse: 99   SpO2: 98%   Weight: (!) 227 lb (103 kg)      Body mass index is 33.52 kg/m .    Alert.  Mildly ill.  Nontoxic.  Cooperative.  Transfers independently.  Chest clear.  Cardiac exam without tachycardia.  Extremities warm and dry.  Skin turgor appropriate.      This note has been dictated using voice recognition software and as a result may contain minor grammatical errors and unintended word substitutions.

## 2021-06-13 NOTE — TELEPHONE ENCOUNTER
Refill request submitted and run through RN protocol for filling.     Closing encounter    Cynthia Mckenna, RN [facility] Triage/Med Refill 12/22/2020 12:49 PM

## 2021-06-13 NOTE — TELEPHONE ENCOUNTER
Requesting refill- submitting to refill pool.   Janel Arenas RN 38 minutes ago (12:09 PM)        Pharmacy states patient is out of metoprolol 100mg tablets and they have already given him an emergency supply.  Last prescription was written 11/19/19.     Janel Arenas RN  [facility] Triage Nurse Advisor     Please close encounter when completed.        Cynthia Mckenna RN [facility] Triage/Med Refill 12/22/2020 12:48 PM

## 2021-06-14 NOTE — TELEPHONE ENCOUNTER
Who is calling:  Janel Norris Rn [facility]  Reason for Call:  Discuss test results  Janel is available until 3:30pm at [phone]

## 2021-06-15 NOTE — TELEPHONE ENCOUNTER
Refill request from [facility] [place], [place]    Medication: Prednisone 2.5mg  Last Refill: 1/20/21  Last OV: 11/11/20  Labs: 1/11/21 Future Appt: 3/1/21  Fute Appt: 3/14/21

## 2021-06-15 NOTE — PROGRESS NOTES
Abdi Joseph is a 55 y.o. male who is being evaluated via a billable telephone visit.      What phone number would you like to be contacted at? [phone]  How would you like to obtain your AVS? AVS Preference: MyChart.       Reason for visit      1. Type 1 diabetes mellitus with kidney complication (H)    2. Type 1 Diabetes Mellitus        HPI     Abdi Joseph is a very pleasant 55 y.o. old male who presents for follow up.  SUMMARY:    Abdi is contacted today in f/u for DM 1. His current A1c is 6.1 and down from his last at 6.8. He takes Lantus, 27 units daily and uses a sliding scale with Novolin R with meals. He uses a Dexcom, but was unable to download it today. He is reporting that he had a blood clot in his L calve for which he is now on a blood thinner.     Blood glucose data:  Unavailable    Lab 3/16/21  A1C: 6.1  TSH: 5.23    LDL: 72 (11/18/20)  CREATININE: 2.32 (3/01/21)  Microalbumin: 11/18/20    Blood Glucose Log - Dexcom report not available      Past Medical History     Patient Active Problem List   Diagnosis     Acute Colitis     Dehydration (Na, H2O)     Diabetic Ulcer Of The Left Foot     Cellulitis     Cellulitis Of The Left Foot     Hypothyroidism, unspecified type     Hyponatremia     Anemia     JENI (generalized anxiety disorder)     Chronic Kidney Disease, Stage 3     Osteoarthritis Of The Knee     Type 1 diabetes mellitus with ketoacidosis (H)     Pneumonia     Abdominal Pain     Intractable vomiting     Hyperkalemia     Hyponatremia     Hypopyon ulcer     Acute-on-chronic kidney injury (H)     MRSA infection     Hypomagnesemia     Hypertension     Thrombosis, superior sagittal sinus     Cellulitis of right foot     Hyperglycemia     Acute kidney injury (H)     Diabetic ketoacidosis without coma associated with type 1 diabetes mellitus (H)     Lactic acidosis     Foot ulcer, right, with unspecified severity (H)     Gastrointestinal hemorrhage, unspecified gastrointestinal hemorrhage type      Cellulitis     Osteomyelitis of right foot (H)     ALBERTA (acute kidney injury) (H)     Hyperlipidemia, unspecified hyperlipidemia type     Essential hypertension, benign     Type 1 diabetes mellitus with kidney complication (H)     Acute gastroenteritis     Hospital discharge follow-up     Viral gastroenteritis     Hypoglycemia     Influenza A     Panic disorder     Depression     Anxiety     CKD (chronic kidney disease) stage 3, GFR 30-59 ml/min     Pneumonia due to infectious organism     Blood clots in brain     Diarrhea     Frequent falls     Gastroparesis     Localized edema     Major depressive disorder, recurrent episode, moderate (H)     Myoclonic jerking     Tremor     Optic neuritis     Polypharmacy     Septic arthritis (H)     DJD (degenerative joint disease) of knee     Seronegative rheumatoid arthritis of multiple sites (H)     LOREN positive     High risk medication use        Family History       family history includes Asthma in his father; Diabetes in his brother and sister; Heart attack in his brother and father; Heart disease in his father and mother.    Social History      reports that he has never smoked. He has never used smokeless tobacco. He reports current alcohol use of about 4.0 standard drinks of alcohol per week. He reports that he does not use drugs.      Review of Systems     Patient has no polyuria or polydipsia, no chest pain, dyspnea or TIA's, no numbness, tingling or pain in extremities  Remainder negative except as noted in HPI.    Vital Signs     There were no vitals taken for this visit.  Wt Readings from Last 3 Encounters:   12/02/20 (!) 227 lb (103 kg)   11/11/20 216 lb (98 kg)   10/06/20 216 lb (98 kg)       Physical Exam           Assessment     1. Type 1 diabetes mellitus with kidney complication (H)    2. Type 1 Diabetes Mellitus        Plan       Abdi's DM management has improved. He will continue with his current medication regimen and f/u with me in 6 months.        Beatris Kendall   Endocrinology  3/23/2021  9:06 AM        Lab Results     Hemoglobin A1c   Date Value Ref Range Status   03/16/2021 6.1 (H) <=5.6 % Final   12/02/2020 6.8 (H) <=5.6 % Final     Comment:     Normal <5.7% Prediabete 5.7-6.4% Diabletes 6.5% or higher - adopted from ADA consensus guidelines   09/02/2020 6.2 (H) <=5.6 % Final     Comment:     Normal <5.7% Prediabete 5.7-6.4% Diabletes 6.5% or higher - adopted from ADA consensus guidelines   06/30/2020 6.4 (H) 3.5 - 6.0 % Final   03/04/2020 6.8 (H) 3.5 - 6.0 % Final     Creatinine   Date Value Ref Range Status   03/01/2021 2.32 (H) 0.70 - 1.30 mg/dL Final   01/11/2021 2.37 (H) 0.70 - 1.30 mg/dL Final   12/02/2020 2.83 (H) 0.70 - 1.30 mg/dL Final     Microalbumin, Random Urine   Date Value Ref Range Status   03/04/2020 44.43 (H) 0.00 - 1.99 mg/dL Final       Cholesterol   Date Value Ref Range Status   11/19/2019 124 <=199 mg/dL Final     HDL Cholesterol   Date Value Ref Range Status   11/19/2019 40 >=40 mg/dL Final     LDL Calculated   Date Value Ref Range Status   11/19/2019 54 <=129 mg/dL Final     Triglycerides   Date Value Ref Range Status   11/19/2019 148 <=149 mg/dL Final       Lab Results   Component Value Date    ALT 12 03/01/2021    AST 10 12/02/2020    ALKPHOS 118 12/02/2020    BILITOT 0.3 12/02/2020         Current Medications     Outpatient Medications Prior to Visit   Medication Sig Dispense Refill     acetaminophen (TYLENOL) 500 MG tablet Take 1-2 tablets (500-1,000 mg total) by mouth every 4 (four) hours as needed.  0     aspirin 81 MG EC tablet Take 81 mg by mouth daily.       blood-glucose sensor (DEXCOM G6 SENSOR) Belia Use as directed for continuous glucose monitoring. 1 Device 13     blood-glucose transmitter (DEXCOM G6 TRANSMITTER) Belia Use as directed for continuous glucose monitoring. Change every 3 months 1 Device 4     calcium, as carbonate, (TUMS) 200 mg calcium (500 mg) chewable tablet Chew 1 tablet 3 (three) times a day  as needed for heartburn.       clonazePAM (KLONOPIN) 0.5 MG tablet Take 0.5 mg by mouth daily before breakfast.       clonazePAM (KLONOPIN) 0.5 MG tablet Take 1.5 mg by mouth at bedtime.       docusate sodium (COLACE) 100 MG capsule Take 100 mg by mouth 2 (two) times a day.       folic acid (FOLVITE) 1 MG tablet TAKE 1 TABLET(1 MG) BY MOUTH DAILY 90 tablet 3     gabapentin (NEURONTIN) 400 MG capsule Take 2 capsules (800 mg total) by mouth 3 (three) times a day. 180 capsule 2     insulin glargine (LANTUS SOLOSTAR U-100 INSULIN) 100 unit/mL (3 mL) pen Inject 27 Units under the skin at bedtime. (Patient taking differently: Inject 28 Units under the skin at bedtime. ) 25 mL 3     insulin syringe-needle U-100 0.3 mL 29 gauge Syrg USE ONCE DAILY 100 each 1     losartan (COZAAR) 50 MG tablet Take 1 tablet (50 mg total) by mouth 2 (two) times a day. 180 tablet 3     methotrexate 2.5 MG tablet Take 4 tablets (10 mg total) by mouth once a week. 16 tablet 1     metoprolol tartrate (LOPRESSOR) 100 MG tablet Take 1 tablet (100 mg total) by mouth 2 (two) times a day. 180 tablet 3     NOVOLIN R REGULAR U-100 INSULN 100 unit/mL injection Check blood sugar four (4) times daily.  10.65 Type 1 with hyperglycemia  No supplies needed  BD Ultra-fine 6 mm 31G 0.3 mL syringe - NDC [address]-33 - dispense 1 case, refill PRN for 1 year (Patient taking differently: Inject 5-10 Units under the skin 3 (three) times a day before meals. Check blood sugar four (4) times daily.  10.65 Type 1 with hyperglycemia  No supplies needed  BD Ultra-fine 6 mm 31G 0.3 mL syringe - NDC [address]-95 - dispense 1 case, refill PRN for 1 year) 10 mL PRN     ondansetron (ZOFRAN) 4 MG tablet TAKE 1 TABLET (4 MG TOTAL) BY MOUTH EVERY 8 (EIGHT) HOURS AS NEEDED FOR NAUSEA. 20 tablet 3     ondansetron (ZOFRAN-ODT) 4 MG disintegrating tablet Take 1 tablet (4 mg total) by mouth every 8 (eight) hours as needed for nausea. 20 tablet 1     pantoprazole (PROTONIX) 40 MG  "tablet TAKE 1 TABLET(40 MG) BY MOUTH TWICE DAILY 180 tablet 3     pramipexole (MIRAPEX) 0.25 MG tablet Take 2 tablets (0.5 mg total) by mouth at bedtime. 28 tablet 4     QUEtiapine (SEROQUEL) 300 MG tablet Take 600 mg by mouth bedtime.       QUEtiapine (SEROQUEL) 50 MG tablet Take 50 mg by mouth daily. Take with breakfast and lunch.       sildenafil (VIAGRA) 100 MG tablet Take 1 tablet (100 mg total) by mouth as needed for erectile dysfunction. 30 tablet 2     simvastatin (ZOCOR) 20 MG tablet TAKE 1 TABLET(20 MG) BY MOUTH AT BEDTIME 90 tablet 3     traZODone (DESYREL) 100 MG tablet TAKE 2 TABLETS BY MOUTH AT BEDTIME FOR INSOMNIA       pen needle, diabetic (BD ULTRA-FINE VICTORIANO PEN NEEDLE) 32 gauge x 5/32\" Ndle Use daily with insulin pen 100 each 6     traZODone (DESYREL) 50 MG tablet Take 1 tablet (50 mg total) by mouth at bedtime. (Patient taking differently: Take 100 mg by mouth at bedtime. ) 30 tablet 0     Facility-Administered Medications Prior to Visit   Medication Dose Route Frequency Provider Last Rate Last Admin     bupivacaine-epinephrine (PF) 0.75 %-1:200,000 1.8 mL, EPINEPHrine 0.01 mL, fentaNYL pf 50 mcg/mL 0.5 mL    Continuous PRN WILLIAM Starkey MBBS   2.31 mL at 09/09/14 0728             Phone call duration: 20 minutes        "

## 2021-06-15 NOTE — PROGRESS NOTES
Abdi Joseph is a 55 y.o. male who is being evaluated via a billable telephone visit.      What phone number would you like to be contacted at? [phone]  How would you like to obtain your AVS? AVS Preference: Mail a copy.  Phone call duration: 8 minutes    This document was created using a software with less than 100% fidelity, at times resulting in unintended, even erroneous syntax and grammar.  The reader is advised to keep this under consideration while reviewing, interpreting this note.           ASSESSMENT AND PLAN:    Diagnoses and all orders for this visit:    Seronegative rheumatoid arthritis of multiple sites (H)  -     methotrexate 2.5 MG tablet; Take 4 tablets (10 mg total) by mouth once a week.  Dispense: 16 tablet; Refill: 1    Stage 3b chronic kidney disease    LOREN positive    High risk medication use        Follow up in 3 months      HISTORY OF PRESENTING ILLNESS:  Abdi Joseph 55 y.o. is evaluated here via video/audio link.  This patient is here for follow-up.  He has seronegative rheumatoid arthritis.  He has significant renal impairment.  He had positive LOREN.  He has made very significant improvement with a modest dose of methotrexate, 10 mg/week, in view of his renal impairment he is no longer on prednisone during the past several weeks, he takes folic acid.  He noted however that his pain in the fingers comes back within a few minutes after he starts playing his guitar.  He unfortunately already has erosions and beginning of osteoarthritis.  Since he cannot take nonsteroidals he is going to try acetaminophen half an hour or so prior to start playing.  For now we will stay the course with current dose of methotrexate, folic acid, follow-up in 3 months, labs before.       ROS enquiry held for fever, ocular symptoms, rash, headache,  GI issues.  Today we also discussed the issues related to the current pandemic, the pros and cons of the current treatment plan, the CDC guidelines such as  social distancing washing the hands covering the cough.  ALLERGIES:Linezolid, Metoclopramide, and Prochlorperazine edisylate    PAST MEDICAL/ACTIVE PROBLEMS/MEDICATION/SOCIAL DATA  Past Medical History:   Diagnosis Date     Anxiety      Arthritis      Cellulitis      Chronic Kidney Disease, Stage 3      Depression      Diabetes mellitus (H)      DKA (diabetic ketoacidoses) (H)      Gastroparesis      Hypertension      Hyponatremia      Hypothyroidism      MRSA (methicillin resistant Staphylococcus aureus)      Neuropathy      Osteoarthritis Of The Knee      Panic disorder      PONV (postoperative nausea and vomiting)      Retinopathy due to secondary diabetes (H)      Thrombosis of superior sagittal sinus      Social History     Tobacco Use   Smoking Status Never Smoker   Smokeless Tobacco Never Used     Patient Active Problem List   Diagnosis     Acute Colitis     Dehydration (Na, H2O)     Diabetic Ulcer Of The Left Foot     Cellulitis     Cellulitis Of The Left Foot     Hypothyroidism, unspecified type     Hyponatremia     Anemia     JENI (generalized anxiety disorder)     Chronic Kidney Disease, Stage 3     Osteoarthritis Of The Knee     Type 1 diabetes mellitus with ketoacidosis (H)     Pneumonia     Abdominal Pain     Intractable vomiting     Hyperkalemia     Hyponatremia     Hypopyon ulcer     Acute-on-chronic kidney injury (H)     MRSA infection     Hypomagnesemia     Hypertension     Thrombosis, superior sagittal sinus     Cellulitis of right foot     Hyperglycemia     Acute kidney injury (H)     Diabetic ketoacidosis without coma associated with type 1 diabetes mellitus (H)     Lactic acidosis     Foot ulcer, right, with unspecified severity (H)     Gastrointestinal hemorrhage, unspecified gastrointestinal hemorrhage type     Cellulitis     Osteomyelitis of right foot (H)     ALBERTA (acute kidney injury) (H)     Hyperlipidemia, unspecified hyperlipidemia type     Essential hypertension, benign     Type 1  diabetes mellitus with kidney complication (H)     Acute gastroenteritis     Hospital discharge follow-up     Viral gastroenteritis     Hypoglycemia     Influenza A     Panic disorder     Depression     Anxiety     CKD (chronic kidney disease) stage 3, GFR 30-59 ml/min     Pneumonia due to infectious organism     Blood clots in brain     Diarrhea     Frequent falls     Gastroparesis     Localized edema     Major depressive disorder, recurrent episode, moderate (H)     Myoclonic jerking     Tremor     Optic neuritis     Polypharmacy     Septic arthritis (H)     DJD (degenerative joint disease) of knee     Current Outpatient Medications   Medication Sig Dispense Refill     acetaminophen (TYLENOL) 500 MG tablet Take 1-2 tablets (500-1,000 mg total) by mouth every 4 (four) hours as needed.  0     aspirin 81 MG EC tablet Take 81 mg by mouth daily.       blood-glucose sensor (DEXCOM G6 SENSOR) Belia Use as directed for continuous glucose monitoring. 1 Device 13     blood-glucose transmitter (DEXCOM G6 TRANSMITTER) Belia Use as directed for continuous glucose monitoring. Change every 3 months 1 Device 4     calcium, as carbonate, (TUMS) 200 mg calcium (500 mg) chewable tablet Chew 1 tablet 3 (three) times a day as needed for heartburn.       clonazePAM (KLONOPIN) 0.5 MG tablet Take 0.5 mg by mouth daily before breakfast.       clonazePAM (KLONOPIN) 0.5 MG tablet Take 1.5 mg by mouth at bedtime.       docusate sodium (COLACE) 100 MG capsule Take 100 mg by mouth 2 (two) times a day.       folic acid (FOLVITE) 1 MG tablet TAKE 1 TABLET(1 MG) BY MOUTH DAILY 90 tablet 3     gabapentin (NEURONTIN) 400 MG capsule Take 2 capsules (800 mg total) by mouth 3 (three) times a day. 180 capsule 2     insulin glargine (LANTUS SOLOSTAR U-100 INSULIN) 100 unit/mL (3 mL) pen Inject 27 Units under the skin at bedtime. 25 mL 3     insulin syringe-needle U-100 0.3 mL 29 gauge Syrg USE ONCE DAILY 100 each 1     losartan (COZAAR) 50 MG tablet Take  "1 tablet (50 mg total) by mouth 2 (two) times a day. 180 tablet 3     methotrexate 2.5 MG tablet Take 4 tablets (10 mg total) by mouth once a week. 16 tablet 0     metoprolol tartrate (LOPRESSOR) 100 MG tablet Take 1 tablet (100 mg total) by mouth 2 (two) times a day. 180 tablet 3     NOVOLIN R REGULAR U-100 INSULN 100 unit/mL injection Check blood sugar four (4) times daily.  10.65 Type 1 with hyperglycemia  No supplies needed  BD Ultra-fine 6 mm 31G 0.3 mL syringe - NDC [address]-23 - dispense 1 case, refill PRN for 1 year (Patient taking differently: Inject 2-4 Units under the skin 3 (three) times a day before meals. Check blood sugar four (4) times daily.  10.65 Type 1 with hyperglycemia  No supplies needed  BD Ultra-fine 6 mm 31G 0.3 mL syringe - NDC [address]-72 - dispense 1 case, refill PRN for 1 year      ) 10 mL PRN     ondansetron (ZOFRAN) 4 MG tablet TAKE 1 TABLET (4 MG TOTAL) BY MOUTH EVERY 8 (EIGHT) HOURS AS NEEDED FOR NAUSEA. 20 tablet 3     ondansetron (ZOFRAN-ODT) 4 MG disintegrating tablet Take 1 tablet (4 mg total) by mouth every 8 (eight) hours as needed for nausea. 20 tablet 1     pantoprazole (PROTONIX) 40 MG tablet TAKE 1 TABLET(40 MG) BY MOUTH TWICE DAILY 180 tablet 3     pen needle, diabetic (BD ULTRA-FINE VICTORIANO PEN NEEDLE) 32 gauge x 5/32\" Ndle Use daily with insulin pen 100 each 6     pramipexole (MIRAPEX) 0.25 MG tablet Take 2 tablets (0.5 mg total) by mouth at bedtime. 28 tablet 4     QUEtiapine (SEROQUEL) 300 MG tablet Take 600 mg by mouth bedtime.       QUEtiapine (SEROQUEL) 50 MG tablet Take 50 mg by mouth daily. Take with breakfast and lunch.       sildenafil (VIAGRA) 100 MG tablet Take 1 tablet (100 mg total) by mouth as needed for erectile dysfunction. 30 tablet 2     simvastatin (ZOCOR) 20 MG tablet TAKE 1 TABLET(20 MG) BY MOUTH AT BEDTIME 90 tablet 3     traZODone (DESYREL) 50 MG tablet Take 1 tablet (50 mg total) by mouth at bedtime. (Patient taking differently: Take 100 mg by " mouth at bedtime. ) 30 tablet 0     predniSONE (DELTASONE) 2.5 MG tablet Take 7.5 mg/d prednisone PO for 1 month, reduce to 5 mg/d for the next month, and then reduce to 2.5 mg/d for the third month and then stop.. 90 tablet 2     No current facility-administered medications for this visit.      Facility-Administered Medications Ordered in Other Visits   Medication Dose Route Frequency Provider Last Rate Last Admin     bupivacaine-epinephrine (PF) 0.75 %-1:200,000 1.8 mL, EPINEPHrine 0.01 mL, fentaNYL pf 50 mcg/mL 0.5 mL    Continuous PRN WILLIAM Starkey MBBS   2.31 mL at 09/09/14 0728         EXAMINATION:     On the phone sounded comfortable, alert, oriented, speech was fluent,  memory recall appeared normal, did not sound like was in pain or short of breath.    LAB / IMAGING DATA:  ALT   Date Value Ref Range Status   03/01/2021 12 0 - 45 U/L Final   01/11/2021 16 0 - 45 U/L Final   12/02/2020 13 0 - 45 U/L Final     Albumin   Date Value Ref Range Status   03/01/2021 3.5 3.5 - 5.0 g/dL Final   01/11/2021 3.5 3.5 - 5.0 g/dL Final   12/02/2020 3.5 3.5 - 5.0 g/dL Final     Creatinine   Date Value Ref Range Status   03/01/2021 2.32 (H) 0.70 - 1.30 mg/dL Final   01/11/2021 2.37 (H) 0.70 - 1.30 mg/dL Final   12/02/2020 2.83 (H) 0.70 - 1.30 mg/dL Final       WBC   Date Value Ref Range Status   03/01/2021 7.0 4.0 - 11.0 thou/uL Final   01/11/2021 6.6 4.0 - 11.0 thou/uL Final   06/13/2015 3.1 (L) 4.0 - 11.0 thou/uL Final   06/12/2015 3.3 (L) 4.0 - 11.0 thou/uL Final     Hemoglobin   Date Value Ref Range Status   03/01/2021 11.7 (L) 14.0 - 18.0 g/dL Final   01/11/2021 12.5 (L) 14.0 - 18.0 g/dL Final   12/02/2020 13.0 (L) 14.0 - 18.0 g/dL Final     Platelets   Date Value Ref Range Status   03/01/2021 165 140 - 440 thou/uL Final   01/11/2021 179 140 - 440 thou/uL Final   12/02/2020 124 (L) 140 - 440 thou/uL Final       Lab Results   Component Value Date    RF <15.0 10/06/2020    SEDRATE 8 10/06/2020

## 2021-06-16 PROBLEM — J18.9 PNEUMONIA DUE TO INFECTIOUS ORGANISM: Status: ACTIVE | Noted: 2020-02-25

## 2021-06-16 PROBLEM — F41.0 PANIC DISORDER: Status: ACTIVE | Noted: 2020-02-23

## 2021-06-16 PROBLEM — I10 ESSENTIAL HYPERTENSION, BENIGN: Status: ACTIVE | Noted: 2018-12-12

## 2021-06-16 PROBLEM — A08.4 VIRAL GASTROENTERITIS: Status: ACTIVE | Noted: 2019-03-06

## 2021-06-16 PROBLEM — F41.9 ANXIETY: Status: ACTIVE | Noted: 2020-02-23

## 2021-06-16 PROBLEM — L03.90 CELLULITIS: Status: ACTIVE | Noted: 2017-11-02

## 2021-06-16 PROBLEM — E10.29 TYPE 1 DIABETES MELLITUS WITH KIDNEY COMPLICATION (H): Status: ACTIVE | Noted: 2019-02-26

## 2021-06-16 PROBLEM — R76.8 ANA POSITIVE: Chronic | Status: ACTIVE | Noted: 2021-03-04

## 2021-06-16 PROBLEM — Z79.899 HIGH RISK MEDICATION USE: Chronic | Status: ACTIVE | Noted: 2021-03-04

## 2021-06-16 PROBLEM — Z09 HOSPITAL DISCHARGE FOLLOW-UP: Status: ACTIVE | Noted: 2019-03-06

## 2021-06-16 PROBLEM — E78.5 HYPERLIPIDEMIA, UNSPECIFIED HYPERLIPIDEMIA TYPE: Status: ACTIVE | Noted: 2018-12-12

## 2021-06-16 PROBLEM — N18.30 CKD (CHRONIC KIDNEY DISEASE) STAGE 3, GFR 30-59 ML/MIN (H): Status: ACTIVE | Noted: 2020-02-23

## 2021-06-16 PROBLEM — J10.1 INFLUENZA A: Status: ACTIVE | Noted: 2020-02-22

## 2021-06-16 PROBLEM — M06.09 SERONEGATIVE RHEUMATOID ARTHRITIS OF MULTIPLE SITES (H): Chronic | Status: ACTIVE | Noted: 2021-03-04

## 2021-06-16 PROBLEM — N17.9 AKI (ACUTE KIDNEY INJURY) (H): Status: ACTIVE | Noted: 2017-11-03

## 2021-06-16 PROBLEM — F32.A DEPRESSION: Status: ACTIVE | Noted: 2020-02-23

## 2021-06-16 PROBLEM — E16.2 HYPOGLYCEMIA: Status: ACTIVE | Noted: 2019-11-19

## 2021-06-16 PROBLEM — M86.9 OSTEOMYELITIS OF RIGHT FOOT (H): Status: ACTIVE | Noted: 2017-11-02

## 2021-06-16 PROBLEM — E10.10 DIABETIC KETOACIDOSIS WITHOUT COMA ASSOCIATED WITH TYPE 1 DIABETES MELLITUS (H): Status: ACTIVE | Noted: 2017-06-30

## 2021-06-16 PROBLEM — R60.0 LOCALIZED EDEMA: Status: ACTIVE | Noted: 2017-12-19

## 2021-06-16 NOTE — TELEPHONE ENCOUNTER
Telephone Encounter by Rhina Bingham at 3/18/2019  3:32 PM     Author: Rhina Bingham Service: -- Author Type: --    Filed: 3/18/2019  3:38 PM Encounter Date: 3/7/2019 Status: Signed    : Rhina Bingham       PRIOR AUTHORIZATION DENIED    Denial Rational:  Per call to patient's [employer] plan this was denied.  Reasoning is [employer] Part D does not cover diabetic testing supplies.  He does have [employer] Medicare Advantage plan which includes Part B (medical benefits) but only covers diabetic supplies at a discounted rate.    Pharmacy was called and made aware, they are reaching out to the patient.

## 2021-06-17 ENCOUNTER — COMMUNICATION - HEALTHEAST (OUTPATIENT)
Dept: ADMINISTRATIVE | Facility: CLINIC | Age: 56
End: 2021-06-17

## 2021-06-17 DIAGNOSIS — I10 ESSENTIAL HYPERTENSION, BENIGN: ICD-10-CM

## 2021-06-17 DIAGNOSIS — K92.2 GASTROINTESTINAL HEMORRHAGE, UNSPECIFIED GASTROINTESTINAL HEMORRHAGE TYPE: ICD-10-CM

## 2021-06-17 RX ORDER — PANTOPRAZOLE SODIUM 40 MG/1
TABLET, DELAYED RELEASE ORAL
Qty: 180 TABLET | Refills: 3 | Status: SHIPPED | OUTPATIENT
Start: 2021-06-17 | End: 2021-07-20

## 2021-06-17 RX ORDER — LOSARTAN POTASSIUM 50 MG/1
50 TABLET ORAL 2 TIMES DAILY
Qty: 180 TABLET | Refills: 3 | Status: SHIPPED | OUTPATIENT
Start: 2021-06-17 | End: 2021-07-20

## 2021-06-17 NOTE — TELEPHONE ENCOUNTER
Pt uses [facility] - is unable to get Dexcom Sensor supplies.    States Dr Hannon does not see patient for his diabetes as [employer] insurance is not accepted.     Pt has about three weeks supplies remaining for testing.    Requesting call back     Pt has CT scan today will not available until after 4PM today.

## 2021-06-17 NOTE — TELEPHONE ENCOUNTER
I called the pt and informed that we can fill these for him. He will call [facility] and have them fax us a form.

## 2021-06-17 NOTE — TELEPHONE ENCOUNTER
[facility] calling. They sent paperwork 5/7. If paperwork was faxed, please re-fax as they haven't received it.    They are refaxing paperwork again, incase we didn't get it.

## 2021-06-20 NOTE — LETTER
Letter by Fernando Hannon MD at      Author: Fernando Hannon MD Service: -- Author Type: --    Filed:  Encounter Date: 6/30/2020 Status: (Other)         June 30, 2020     Patient: Abdi Joseph   YOB: 1965   Date of Visit: 6/30/2020       To Whom it May Concern:    Abdi Joseph was seen in my clinic on 6/30/2020.  He is on chronic disability due to underlying chronic health concerns.  Please  provide fishing license without cost due to disability.    If you have any questions or concerns, please don't hesitate to call.    Sincerely,         Electronically signed by Fernando Hannon MD

## 2021-06-22 NOTE — PROGRESS NOTES
Assessment/Plan:    1. Type 1 Diabetes Mellitus  Type 1 diabetes diagnosed age 12.  Continues Lantus insulin 6 units in a.m. and 22 units at bedtime.  Referral was placed to [facility] pharmacist for further evaluation and treatment options.  Continues NovoLog sliding scale currently.  A1c did increase slightly from 7.9% September 12, 2018 up to 8.3% today.  Goal remains less than 8% initially, less than 7.5% ideally.  - Glycosylated Hemoglobin A1c; Standing  - Glycosylated Hemoglobin A1c  - Ambulatory referral to Medication Management  - [facility](CBC w/o Differential)    2. JENI (generalized anxiety disorder)  JENI, severe.  Followed by Dr. Álvarez, psychiatrist.  Continues use of Seroquel 600 mg at bedtime +50 mg with breakfast and 50 mg with lunch.  Gabapentin 400 mg at bedtime +150 mg in the morning.  Clonazepam 1 mg a.m. and 2 mg at bedtime.  Needs 4 tablets of clonazepam refilled to bridge until mail order prescription arrives.  Mirtazapine 30 mg at bedtime.  PHQ 9 12 out of 27 and JENI 7 questionnaire 14 out of 21.  - clonazePAM (KLONOPIN) 1 MG tablet; Take 1 tablet (1 mg total) by mouth every morning.  Dispense: 4 tablet; Refill: 0    3. Essential hypertension, benign  Essential hypertension.  Apparently previously on amlodipine 5 mg historically twice daily.  Need to confirm no longer using.  Otherwise metoprolol tartrate 50 mg using 2 tablets twice daily.  - Comprehensive Metabolic Panel    4. Hypothyroidism, unspecified type  History of hypothyroidism.  Not currently on thyroid replacement.  Check TSH.  - Thyroid Stimulating Hormone (TSH)    5. Hyperlipidemia, unspecified hyperlipidemia type  Continue simvastatin 20 mg at bedtime.  - LDL Cholesterol, Direct    6. Screen for colon cancer  Colon cancer screening to be completed.  - Ambulatory referral for Colonoscopy    7. Insomnia  Trazodone 50 mg using 2 tablets at bedtime.    8. Restless leg syndrome  Mirapex 0.5 mg using 3 pills at bedtime.    9. Edema  Furosemide 20 mg  "every morning    45 minutes total time with patient, > 50% with counseling and coordination of cares.           Subjective:    Abdi Joseph is seen today for establishment of cares.  Previously followed by Dr. Menezes internal medicine times 30 years.  Patient followed for type 1 diabetes diagnosed age 12.  Currently on Lantus insulin 6 units in the morning and 22 units in the evening.  Prior A1c of 7.9% September 12, 2018.  Does have complications including retinopathy, nephropathy and neuropathy.  Prior osteomyelitis and did have right great toe amputation historically.  He has had multiple hospitalizations.  Past medical social and family history reviewed and updated as noted below.  Blood sugar this morning was 104 and took 7 units of sliding scale NovoLog insulin.  Blood sugar was 269 when rechecked after breakfast.  Using metoprolol tartrate 50 mg taking 2 tablets twice daily for hypertension.  Apparently no longer on amlodipine.  Has generalized anxiety.  Treated with mirtazapine 30 mg at bedtime, Seroquel 600 at bedtime as well as 50 mg a.m. and 50 mg lunch, gabapentin 400 mg at bedtime and 150 mg in the morning.  History of hypothyroidism apparently no longer on thyroid replacement.  History of chronic pain and had been on Dilaudid.  Apparently no longer on imipramine.  Right great toe osteomyelitis requiring amputation and reviewed discharge summary from Hospital November 6, 2017 for additional details.  Needs refill on clonazepam to bridge until mail order prescription arrives.  Needs 4 tablets.  Dr. Dos Santos is his podiatrist.  Uses furosemide 20 mg each morning for edema.  Mirapex 0.5 mg apparently using 3 pills at bedtime.  Insomnia treated with trazodone 50 mg taking 2 tablets at bedtime.  Needs to complete colonoscopy.  Comprehensive review of systems as above otherwise all negative.     - Grecia  Tobacco:  no  EtOH:  Bacardi Diet Coke a couple times per week with his wife (\"not a big " "drinker\")  Mom - alive 89  Dad -   Siblings:  Juan Domínguez  Surgeries:  right great toe amputation  Hospitalizations:  osteomyelitis; \"lots of staph and MRSA infections\"; right TKA  Work:  disability    Past Surgical History:   Procedure Laterality Date     ABDOMINAL SURGERY       CHOLECYSTECTOMY       FOOT AMPUTATION Right 11/3/2017    Procedure:  amputation of right hallux, Sesamoid and partial 1st metatarsal;  Surgeon: Lamin Barnett DPM;  Location: [facility];  Service:      KNEE ARTHROSCOPY      x3     refractory surgery       TYMPANOPLASTY W/ MASTOIDECTOMY          Family History   Problem Relation Age of Onset     Heart disease Mother      Heart disease Father      Heart attack Father      Asthma Father      Diabetes Sister      Diabetes Brother      Heart attack Brother         Past Medical History:   Diagnosis Date     Anxiety      Arthritis      Cellulitis      Chronic Kidney Disease, Stage 3      Depression      Diabetes mellitus (H)      DKA (diabetic ketoacidoses) (H)      Gastroparesis      Hypertension      Hyponatremia      Hypothyroidism      MRSA (methicillin resistant Staphylococcus aureus)      Neuropathy (H)      Osteoarthritis Of The Knee      Panic disorder      PONV (postoperative nausea and vomiting)      Retinopathy due to secondary diabetes (H)      Thrombosis of superior sagittal sinus         Social History     Tobacco Use     Smoking status: Never Smoker     Smokeless tobacco: Never Used   Substance Use Topics     Alcohol use: No     Drug use: No        Current Outpatient Medications   Medication Sig Dispense Refill     acetaminophen (TYLENOL) 500 MG tablet Take 1-2 tablets (500-1,000 mg total) by mouth every 4 (four) hours as needed.  0     amLODIPine (NORVASC) 5 MG tablet Take 5 mg by mouth 2 (two) times a day.       aspirin 81 MG EC tablet Take 81 mg by mouth daily.       calcium, as carbonate, (TUMS) 200 mg calcium (500 mg) chewable tablet Chew 1 tablet 3 " (three) times a day as needed for heartburn.       clonazePAM (KLONOPIN) 1 MG tablet Take 2 mg by mouth bedtime.        clonazePAM (KLONOPIN) 1 MG tablet Take 1 tablet (1 mg total) by mouth every morning. 4 tablet 0     docusate sodium (COLACE) 100 MG capsule Take 100 mg by mouth 2 (two) times a day.       gabapentin (NEURONTIN) 400 MG capsule Take 400 mg by mouth bedtime.       HYDROmorphone (DILAUDID) 2 MG tablet Take 1-2 tablets (2-4 mg total) by mouth every 4 (four) hours as needed. 30 tablet 0     LANTUS 100 unit/mL injection Inject 34 Units under the skin at bedtime. 10 mL PRN     magnesium chloride (SLOW-MAG) 64 mg TbEC delayed-release tablet Take 1 tablet (64 mg total) by mouth daily.  0     methocarbamol (ROBAXIN) 750 MG tablet Take 750 mg by mouth 3 (three) times a day as needed.        metoprolol tartrate (LOPRESSOR) 50 MG tablet Take 75 mg by mouth 2 (two) times a day. 1.5 tabs BID       mirtazapine (REMERON) 15 MG tablet Take 15 mg by mouth bedtime.        NOVOLOG 100 unit/mL injection Check blood sugar four (4) times daily.  11.65 Type 2 with hyperglycemia 10 mL PRN     omeprazole (PRILOSEC) 20 MG capsule Take 20 mg by mouth 2 (two) times a day before meals.        pramipexole (MIRAPEX) 0.5 MG tablet Take 1.5 mg by mouth at bedtime Pt STATES HE IS TAKING 3 AT BED TIME.             QUEtiapine (SEROQUEL) 300 MG tablet Take 600 mg by mouth bedtime.       QUEtiapine (SEROQUEL) 50 MG tablet Take 50 mg by mouth 2 times a day at 9:00am and 5:00pm PT STATES HE IS TAKING 150MG IN Morning.             simvastatin (ZOCOR) 20 MG tablet Take 20 mg by mouth bedtime. At bedtime       traZODone (DESYREL) 50 MG tablet Take 100 mg by mouth bedtime.       imipramine (TOFRANIL) 50 MG tablet Take 50 mg by mouth every morning.       imipramine (TOFRANIL) 50 MG tablet Take 150 mg by mouth bedtime.       NOVOLOG 100 unit/mL injection Check blood sugar four (4) times daily.  11.65 Type 2 with hyperglycemia 10 mL PRN      "ondansetron (ZOFRAN) 4 MG tablet Take 4 mg by mouth every 8 (eight) hours as needed for nausea.        No current facility-administered medications for this visit.      Facility-Administered Medications Ordered in Other Visits   Medication Dose Route Frequency Provider Last Rate Last Dose     bupivacaine-epinephrine (PF) 0.75 %-1:200,000 1.8 mL, EPINEPHrine 0.01 mL, fentaNYL pf 50 mcg/mL 0.5 mL    Continuous PRN WILLIAM Starkey MBBS   2.31 mL at 09/09/14 0728          Objective:    Vitals:    12/12/18 1402 12/12/18 1438   BP: 180/70 124/60   Pulse: 83    SpO2: 96%    Weight: 191 lb (86.6 kg)    Height: 5' 7.75\" (1.721 m)       Body mass index is 29.26 kg/m .    Alert.  No apparent distress.  Mild psychomotor agitation.  Cooperative and forthcoming.  Visual acuity decreased.  Chest clear.  Cardiac exam regular.  Extremities warm and dry without significant peripheral edema.  Foot exam deferred currently since seeing podiatrist every 2-4 weeks however describes right great toe amputation.      This note has been dictated using voice recognition software and as a result may contain minor grammatical errors and unintended word substitutions.   "

## 2021-06-23 NOTE — TELEPHONE ENCOUNTER
Per Selina notes patient has erectile dysfunction and has used sildenafil in the past  rx pended for approval if appropriate

## 2021-06-23 NOTE — TELEPHONE ENCOUNTER
Medication Request  Medication name: sildenafil 20 mg tab for #30  Pharmacy Name and Location: [facility]  Reason for request: Patient stated he has a coupon to get #30 for $40. Patient would like an Rx sent, today.  When did you use medication last?:  5 days ago  Okay to leave a detailed message: yes  [phone]

## 2021-06-23 NOTE — TELEPHONE ENCOUNTER
Medication Request  Medication name: omeprazole (PRILOSEC) 20 MG capsule - 1 capsule by mouth twice daily  Pharmacy Name and Location: [facility] #[id number]  Reason for request: Historical Medication - patient needs a short supply until [employer] can get the RX to patient.  When did you use medication last?:  Today  Okay to leave a detailed message: yes

## 2021-06-24 NOTE — TELEPHONE ENCOUNTER
Per pharmacy they are just processing the sensors.   Called [employer]  [phone] to initiate PA.     Central PA team  [phone]  Pool: HE PA MED ([id number]    PA has been initiated.   Marked urgent. [facility]# [id number]

## 2021-06-24 NOTE — TELEPHONE ENCOUNTER
Left message to call back for: diabetic supplies   Information to relay to patient:    -Please clarify with patient what diabetic supplies patient would like sent to [facility] mail order pharmacy (test strips, lancets, glucometer)?   -What brand of glucometer is he using?   -How many times is he testing daily?

## 2021-06-24 NOTE — TELEPHONE ENCOUNTER
From what I recall, patient's pain in his throat was attributed to his recent vomiting etc.  I do not feel comfortable prescribing antibiotic without him being seen again if this is still a concern.  Please let me know if he has any other questions.  Thanks

## 2021-06-24 NOTE — TELEPHONE ENCOUNTER
Who is calling:  Patient   Reason for Call:  Patient stated that it is hard for him to eat anything still. Patient stated that even pudding hurts. Patient is wondering if an antibiotic should be prescribed.  Date of last appointment with primary care: 3/6  Okay to leave a detailed message: Yes [phone]

## 2021-06-24 NOTE — PROGRESS NOTES
Assessment/Plan:    1. Type 1 diabetes mellitus with retinopathy of both eyes, macular edema presence unspecified, unspecified retinopathy severity (H)  Type 1 diabetes diagnosed age 12.  Continues Lantus 28 units at bedtime. Didn't realize that he was suppose to use 34 units.  Will increase.  Also, using Novolog sliding scale (typically 30+ units daily total between 4-6 doses).  Referral was placed previously to [facility] pharmacist for further evaluation and treatment options.  He is scheduled to meet with Clara on 3/7/19 for further treatment adjustments.  Continues NovoLog sliding scale currently.  A1c did increase slightly from 7.9% September 12, 2018 up to 8.3% today.  Goal remains less than 8% initially, less than 7.5% ideally.  Known history of diabetic retinopathy and will follow-up with eye specialist as noted.  Currently states on annual basis.  Patient continues to follow with podiatrist as well for chronic foot care.    2. Essential hypertension, benign  Essential hypertension.  No longer on amlodipine.  Otherwise metoprolol tartrate 50 mg using 2 tablets twice daily.  Will add lisinopril 5 mg daily for renoprotective effects.  BP recheck at scheduled follow-up on 3/28/19.  Check microalbumin level today.    3. Hypothyroidism, unspecified type  TSH at goal with TSH = 3.42 on 12/12/18.  History of medication but has not been on for years apparently.    4. Hyperlipidemia, unspecified hyperlipidemia type  Continue simvastatin 20 mg at bedtime.  LDL = 64 on 12/12/18.  Will complete fasting labs at upcoming office visit on 3/28/19.    5. JENI  JENI, severe.  Followed by Dr. Álvarez, psychiatrist.  Continues use of Seroquel 600 mg at bedtime +50 mg with breakfast and 50 mg with lunch.  Gabapentin 400 mg at bedtime +150 mg in the morning.  Clonazepam 1 mg a.m. and 2 mg at bedtime.  Needs 4 tablets of clonazepam refilled to bridge until mail order prescription arrives.  Mirtazapine 30 mg at bedtime.  PHQ 9  was 12 out of  "27 and JENI 7 questionnaire 14 out of 21 on 19.    6. Restless leg syndrome  Refill provided for Mirapex 0.125 mg take 3 tablets at bedtime.        Subjective:    Abdi Joseph is seen today for diabetes follow-up.  Type 1 diabetes noted diagnosed age 12.  Using Lantus 28 units at bedtime.  Previously instructed patient to increase up to 34 units.  Has been using NovoLog sliding scale as well up to 30+ units daily over 4-6 doses typically.  Patient was seen by [facility] pharmacist briefly today and will schedule follow-up appointment with margin on 2019 for further review of diabetes management etc.  Has not been on angiotensin receptor blocker or ACE inhibitor recently with history of hypertension.  Has not had recent microalbumin screen.  Has been on thyroid replacement in distant past however not currently with most recent TSH 3.42 and 2018.  Patient will have fasting labs at follow-up appointment 2019 however recent LDL at goal 64 2018.  Followed by psychiatrist Dr. Álvarez and continues home medication as noted.  Requesting refill on Mirapex which he takes 3 tablets once daily at bedtime with benefit with restless leg syndrome management.     - Grecia  Tobacco: no  EtOH: Bacardi Diet Coke a couple times per week with his wife (\"not a big drinker\")  Mom - alive 89  Dad -   Siblings: Juan Domínguez  Surgeries: right great toe amputation  Hospitalizations: osteomyelitis; \"lots of staph and MRSA infections\"; right TKA  Work: disability    Dr. Álvarez - psychiatrist  Dr. Jesus Dos Santos - podiatrist  Dr. Menezes - internal medicine (followed x 30 years)    Past Surgical History:   Procedure Laterality Date     ABDOMINAL SURGERY       CHOLECYSTECTOMY       FOOT AMPUTATION Right 11/3/2017    Procedure:  amputation of right hallux, Sesamoid and partial 1st metatarsal;  Surgeon: Lamin Barnett DPM;  Location: [facility] OR;  Service:      KNEE ARTHROSCOPY      x3     " refractory surgery       TYMPANOPLASTY W/ MASTOIDECTOMY  2012        Family History   Problem Relation Age of Onset     Heart disease Mother      Heart disease Father      Heart attack Father      Asthma Father      Diabetes Sister      Diabetes Brother      Heart attack Brother         Past Medical History:   Diagnosis Date     Anxiety      Arthritis      Cellulitis      Chronic Kidney Disease, Stage 3      Depression      Diabetes mellitus (H)      DKA (diabetic ketoacidoses) (H)      Gastroparesis      Hypertension      Hyponatremia      Hypothyroidism      MRSA (methicillin resistant Staphylococcus aureus)      Neuropathy (H)      Osteoarthritis Of The Knee      Panic disorder      PONV (postoperative nausea and vomiting)      Retinopathy due to secondary diabetes (H)      Thrombosis of superior sagittal sinus         Social History     Tobacco Use     Smoking status: Never Smoker     Smokeless tobacco: Never Used   Substance Use Topics     Alcohol use: No     Drug use: No        Current Outpatient Medications   Medication Sig Dispense Refill     acetaminophen (TYLENOL) 500 MG tablet Take 1-2 tablets (500-1,000 mg total) by mouth every 4 (four) hours as needed.  0     aspirin 81 MG EC tablet Take 81 mg by mouth daily.       blood glucose test strips Use 1 each As Directed as needed Dispense brand per patient's insurance at pharmacy discretion.. 200 strip 11     calcium, as carbonate, (TUMS) 200 mg calcium (500 mg) chewable tablet Chew 1 tablet 3 (three) times a day as needed for heartburn.       clonazePAM (KLONOPIN) 1 MG tablet Take 1 tablet (1 mg total) by mouth every morning. 4 tablet 0     docusate sodium (COLACE) 100 MG capsule Take 100 mg by mouth 2 (two) times a day.       gabapentin (NEURONTIN) 400 MG capsule Take 400 mg by mouth bedtime.       HYDROmorphone (DILAUDID) 2 MG tablet Take 1-2 tablets (2-4 mg total) by mouth every 4 (four) hours as needed. 30 tablet 0     insulin syringe-needle U-100  "(INSULIN SYRINGE) 1 mL 29 gauge x 1/2\" Syrg Use 1 each As Directed as needed (with insulin). 200 each 11     LANTUS 100 unit/mL injection Inject 34 Units under the skin at bedtime. (Patient taking differently: Inject 34 Units under the skin at bedtime. Pt only doing 28u at bedtime      ) 10 mL PRN     magnesium chloride (SLOW-MAG) 64 mg TbEC delayed-release tablet Take 1 tablet (64 mg total) by mouth daily.  0     methocarbamol (ROBAXIN) 750 MG tablet Take 750 mg by mouth 3 (three) times a day as needed.        metoprolol tartrate (LOPRESSOR) 50 MG tablet Take 100 mg by mouth 2 (two) times a day.             mirtazapine (REMERON) 15 MG tablet Take 15 mg by mouth bedtime.        NOVOLOG 100 unit/mL injection Check blood sugar four (4) times daily.  11.65 Type 2 with hyperglycemia 10 mL PRN     omeprazole (PRILOSEC) 20 MG capsule Take 1 capsule (20 mg total) by mouth 2 (two) times a day before meals. 60 capsule 5     ondansetron (ZOFRAN) 4 MG tablet Take 4 mg by mouth every 8 (eight) hours as needed for nausea.        pramipexole (MIRAPEX) 0.125 MG tablet Take 3 tablets (0.375 mg total) by mouth at bedtime. 90 tablet 5     QUEtiapine (SEROQUEL) 300 MG tablet Take 600 mg by mouth bedtime.       QUEtiapine (SEROQUEL) 50 MG tablet Take 50 mg by mouth 2 (two) times a day Take with breakfast and lunch..             sildenafil, antihypertensive, (REVATIO) 20 mg tablet Take 2-3 tablets (40-60 mg total) by mouth daily as needed. 30 tablet 2     simvastatin (ZOCOR) 20 MG tablet Take 1 tablet (20 mg total) by mouth at bedtime. At bedtime 90 tablet 3     traZODone (DESYREL) 50 MG tablet Take 100 mg by mouth bedtime.       clonazePAM (KLONOPIN) 1 MG tablet Take 2 mg by mouth bedtime.        imipramine (TOFRANIL) 50 MG tablet Take 50 mg by mouth every morning.       imipramine (TOFRANIL) 50 MG tablet Take 150 mg by mouth bedtime.       lisinopril (PRINIVIL,ZESTRIL) 5 MG tablet Take 1 tablet (5 mg total) by mouth daily. 90 tablet 3 "     No current facility-administered medications for this visit.      Facility-Administered Medications Ordered in Other Visits   Medication Dose Route Frequency Provider Last Rate Last Dose     bupivacaine-epinephrine (PF) 0.75 %-1:200,000 1.8 mL, EPINEPHrine 0.01 mL, fentaNYL pf 50 mcg/mL 0.5 mL    Continuous PRN WILLIAM Starkey MBBS   2.31 mL at 09/09/14 0728          Objective:    Vitals:    02/21/19 1124   BP: 110/70   Pulse: 65   SpO2: 98%   Weight: 198 lb (89.8 kg)      Body mass index is 30.33 kg/m .    Alert.  No apparent distress.  Chest clear.  Cardiac exam regular.  Extremities warm and dry.      This note has been dictated using voice recognition software and as a result may contain minor grammatical errors and unintended word substitutions.

## 2021-06-24 NOTE — TELEPHONE ENCOUNTER
Hi Dr. Hannon,  I started home care for Mr. Joseph today and I'm requesting further order for PT/OT eval & treat; SN visits 2x/week for 2 weeks then 1 x/week for 2 weeks for DM I management/teaching, cardiopulmonary assessment.  OK for above orders?    Phone

## 2021-06-24 NOTE — TELEPHONE ENCOUNTER
[facility] is requesting a new order for Skilled nursing on 3/5/19.  Patient was discharged from [facility] on 3/2/19  We have been unable to get in touch with patient until today resulting in a delay of start of care.        Kind regards,    Jamaica Ramírez  [facility] Liaison  [phone]

## 2021-06-24 NOTE — TELEPHONE ENCOUNTER
Called [facility]. Has Option C for his Part D so Part B cant be billed. Went through but the insurance wouldn't cover it. Per [facility]izwan, could get a PA to get it covered and price lowered (currently $300 since insurance not paying anything)    Prior Authorization Request  Who s requesting:  Pharmacy  Pharmacy Name and Location: [facility]  Medication Name: DexCom G6 sensors, reader, transmitter  Insurance Plan: [id number]  Insurance Member ID Number:  [id number]   Informed patient that prior authorizations can take up to 10 business days for response:   Yes  Okay to leave a detailed message: Yes

## 2021-06-24 NOTE — PROGRESS NOTES
[facility] Transition of Care Encounter  Assessment & Plan                                                     Post Discharge Medication Reconciliation Status: discharge medications reconciled and changed, per note/orders (see AVS)    Type 1 Diabetes: Last A1c near goal less than 8%.  Patient does not have values with him today, therefore will continue current Lantus dose.  Recommend that he continue base of Novolin R 2/4/4 before meals, but will add a correction factor.  Using 1800 rule, we will have him add 1 unit of insulin for every 50 mg/dL above 150.  Will place referral for diabetes educator for carb counting and possible adjustment and correction scale.  I will work on getting him a Dex com, but if not covered.  He is okay with a freestyle lucía.   Plan:   1. DME referral placed for carb counting/nutrition/correction scale adjustment  2. I will work on DexCom G6 coverage  3. Continue base insulin:   2 units Breakfast (if just eggs, give 0 base)  4 units Lunch  4 units Dinner    Sliding scale  150-200: add 1  201-250: add 2  251-300: add 3  301-350: add 4  351-400: add 5    Hypertension: Blood pressure well controlled and at goal less than 140/90 mmHg.  We will have him continue lisinopril for renal protection.  Appears that he feels no different without furosemide, therefore we will have him remain off at this time and monitor for worsening and swelling.  Encourage compression stockings and feet elevation.  Will check future magnesium level. Will switch metoprolol to 100 mg strength to lessen pill burden.   Plan:  1.  Stay off furosemide for now.  2.  Future magnesium level    Depression/Anxiety: We will defer to Dr. Álvarez on medication adjustments.  Sleep appears well controlled despite lowering trazodone on his own.    Neuropathy: Stable.  It appears that daytime symptoms are well controlled, therefore we will have him finish the 100 mg strength and see how he does without it.  If pain in hands and feet worsens,  "then will add daytime gabapentin but will switch him to 300 mg which is likely covered.  Plan:  1.  Continue gabapentin 100 mg a.m. until gone due to insurance.  Monitor for worsening in hand and foot pain.  If worse, will add 300 mg gabapentin every morning.    RLS: Stable.  Will simplify regimen and decreased to 0.25 mg since he has tolerated that in the past and pramipexole does not, in 0.375 dosage form. I will switch him to 0.25 mg tablet to decrease pill burden. He was agreeable to this plan.  Plan:  1.  Decrease pramipexole to 0.25 mg nightly.      Follow Up  1-2 weeks my chart    Subjective & Objective                                                       Abdi Joseph is a 53 y.o. male coming in for a transitions of care visit. he was discharged from [facility] on 3/2/19 for DKA. Referred from Dr. Hannon for diabetes. Wife joins us today.     Chief Complaint: Gabapentin not covered     Type 1 Diabetes: Currently taking Lantus 25 units HS and Novolog sliding scale. Admitted 2/26 for DKA. presented with markedly elevated blood glucose of 627 with anion gap of 25, elevated betahydroxybutyrate. Thought that DKA trigerred by acute viral gastroenteritis. Reports feeling better, but throat is \"raw\" from vomiting. Seen by DME and recommended to do Novolog based on carb + sliding scale -- meal time based changed to 2/4/4 before meals.  He does not have a sliding scale to follow -- makes it up in his head.   Does not know how to count carbs.   Switched Novolog to R in hospital. He is ok with continuing the vials. Per DME, gastroparesis so Regular insulin (used for cost issues) may be a good choice as it has slower peak, longer duration.   Did not bring glucometer today. Reports blood sugars: 149 this morning. Other values = 107, 148. Last night bedtime 164   Last A1c checked 12/12/18 = 8.3%.   Hypoglycemia last night was 71 -- could tell and he drank root beer.   Using a AccuChek meter. Interested in DexCom. "   Microalbumin checked 2/21/19  Used to see endocrinology, but endo retired. Is interested in meeting with someone to discuss nutrition and carb counting.   Taking simvastatin 20 mg HS. Last LDL checked 12/12/18  Is taking aspirin 81 mg for primary prevention.     Hypertension: Currently taking metoprolol tartrate 50 mg x2 (100 mg) two times a day and lisinopril 5 mg daily -- started 2/21/19. He feels like the addition of lisinopril has been helpful. Denies lightheadedness/dizziness. ALBERTA in hospital. Lasix was held at discharge and recommended to defer to PCP when to resume. Yesterday BMP was checked, but he does not know if he should restart. Has leg swelling, but reports it is the same as when he is on furosemide.   Every since knee replacement in left swelling in both feet. Water pill doesn't do anything.  Reports not taking magnesium at this time.  BP Readings from Last 3 Encounters:   03/07/19 114/56   03/06/19 110/60   03/05/19 138/76     Depression/Anxiety: Follows with Dr. Ávlarez at [facility] -- sees every 3 months. Currently taking mirtazepine 15 mg HS, trazodone 50 mg HS, and Seroquel 50 mg AM and 600 mg PM, clonazepam 1 mg AM and 2 mg PM. Seroquel and clonazepam help with anxiety. Low BG and anxiety feel similar. Gets panic attacks during the day. Decreased trazodone on his own from 100 mg to 50 mg after discharge from hospital. No problem sleeping. Wakes up in middle fo the night to check BG and falls right back asleep. He would like to be on less medications.     Neuropathy: Currently taking gabapentin 100 mg AM and 400 mg HS. 100 mg strength will not be covered by [employer] anymore. Has about 1 month left. Sharp pain sometimes in both feet, pulsates. Just feet, but hands bothering him from playing guitar. Before going to bed more so, but has not had symptoms for a while.    RLS: Currently taking pramipexole 0.125 mg x3 (0.375 mg) HS. Helpful, no symptoms. Has taken two at night and feels the same.     PMH:  reviewed in EPIC   Allergies/ADRs: reviewed in EPIC   Alcohol: reviewed in EPIC   Tobacco:   Social History     Tobacco Use   Smoking Status Never Smoker   Smokeless Tobacco Never Used     Recent Vitals:   BP Readings from Last 3 Encounters:   03/05/19 138/76   03/02/19 139/61   02/21/19 110/70      Wt Readings from Last 3 Encounters:   02/28/19 187 lb (84.8 kg)   02/21/19 198 lb (89.8 kg)   12/12/18 191 lb (86.6 kg)     ----------------    Much or all of the text in this note was generated through the use of Dragon Dictate voice-to-text software. Errors in spelling or words which seem out of context are unintentional. Sound alike errors, in particular, may have escaped editing.    The patient was given a summary of these recommendations as an after visit summary    I spent 45 minutes with this patient today;  . All changes were made via collaborative practice agreement with Fernando Hannon MD. A copy of the visit note was provided to the patient's provider.     Clara Gilliam, Pharm.D., BCACP  Medication Therapy Management Pharmacist  [facility] and [facility]     Current Outpatient Medications   Medication Sig Dispense Refill     acetaminophen (TYLENOL) 500 MG tablet Take 1-2 tablets (500-1,000 mg total) by mouth every 4 (four) hours as needed.  0     aspirin 81 MG EC tablet Take 81 mg by mouth daily.       blood glucose test strips Use 1 each As Directed as needed Dispense brand per patient's insurance at pharmacy discretion.. 200 strip 11     calcium, as carbonate, (TUMS) 200 mg calcium (500 mg) chewable tablet Chew 1 tablet 3 (three) times a day as needed for heartburn.       clonazePAM (KLONOPIN) 1 MG tablet Take 1 tablet (1 mg total) by mouth every morning. 4 tablet 0     clonazePAM (KLONOPIN) 2 MG tablet Take 2 mg by mouth at bedtime.       docusate sodium (COLACE) 100 MG capsule Take 100 mg by mouth 2 (two) times a day.       gabapentin (NEURONTIN) 100 MG capsule Take 100 mg by mouth every morning.    "    gabapentin (NEURONTIN) 400 MG capsule Take 400 mg by mouth bedtime.       insulin syringe-needle U-100 (INSULIN SYRINGE) 1 mL 29 gauge x 1/2\" Syrg Use 1 each As Directed as needed (with insulin). 200 each 11     LANTUS U-100 INSULIN 100 unit/mL injection Inject 25 Units under the skin at bedtime. 10 mL PRN     lisinopril (PRINIVIL,ZESTRIL) 5 MG tablet Take 1 tablet (5 mg total) by mouth daily. 90 tablet 3     magnesium chloride (SLOW-MAG) 64 mg TbEC delayed-release tablet Take 1 tablet (64 mg total) by mouth daily.  0     methocarbamol (ROBAXIN) 750 MG tablet Take 750 mg by mouth 3 (three) times a day as needed.        metoprolol tartrate (LOPRESSOR) 50 MG tablet Take 100 mg by mouth 2 (two) times a day.             mirtazapine (REMERON) 15 MG tablet Take 15 mg by mouth bedtime.        NOVOLIN R REGULAR U-100 INSULN 100 unit/mL injection Check blood sugar four (4) times daily.  10.65 Type 1 with hyperglycemia  No supplies needed  BD Ultra-fine 6 mm 31G 0.3 mL syringe - NDC [address]-97 - dispense 1 case, refill PRN for 1 year 10 mL PRN     NOVOLIN R REGULAR U-100 INSULN 100 unit/mL injection Check blood sugar four (4) times daily.  10.65 Type 1 with hyperglycemia  No supplies needed  BD Ultra-fine 6 mm 31G 0.3 mL syringe - NDC [address]-74 - dispense 1 case, refill PRN for 1 year 10 mL PRN     omeprazole (PRILOSEC) 20 MG capsule Take 1 capsule (20 mg total) by mouth 2 (two) times a day before meals. 60 capsule 5     ondansetron (ZOFRAN) 4 MG tablet Take 4 mg by mouth every 8 (eight) hours as needed for nausea.        pramipexole (MIRAPEX) 0.125 MG tablet Take 3 tablets (0.375 mg total) by mouth at bedtime. 90 tablet 5     QUEtiapine (SEROQUEL) 300 MG tablet Take 600 mg by mouth bedtime.       QUEtiapine (SEROQUEL) 50 MG tablet Take 50 mg by mouth 2 (two) times a day Take with breakfast and lunch..             sildenafil, antihypertensive, (REVATIO) 20 mg tablet Take 2-3 tablets (40-60 mg total) by mouth daily " as needed. (Patient taking differently: Take 50 mg by mouth daily as needed.       ) 30 tablet 2     silver sulfADIAZINE (SILVADENE, SSD) 1 % cream Apply 1 application topically 2 (two) times a day.       simvastatin (ZOCOR) 20 MG tablet Take 1 tablet (20 mg total) by mouth at bedtime. At bedtime 90 tablet 3     traZODone (DESYREL) 50 MG tablet Take 2 tablets (100 mg total) by mouth at bedtime as needed for sleep.  0     No current facility-administered medications for this visit.      Facility-Administered Medications Ordered in Other Visits   Medication Dose Route Frequency Provider Last Rate Last Dose     bupivacaine-epinephrine (PF) 0.75 %-1:200,000 1.8 mL, EPINEPHrine 0.01 mL, fentaNYL pf 50 mcg/mL 0.5 mL    Continuous PRN WILLIAM Starkey MBBS   2.31 mL at 09/09/14 6363

## 2021-06-24 NOTE — PROGRESS NOTES
Assessment/Plan:    1. Hospital discharge follow-up  2. Diabetic ketoacidosis without coma associated with type 1 diabetes mellitus (H)  Hospital discharge follow-up completed today.  Patient reports improvement in symptoms since hospital discharge.  He continues to take 25 units of his long-acting insulin along with 2-6 units of his regular insulin 3 times a day with meals.  Patient's blood sugars have been at goal between 120 and 150 primarily in the mornings and before dinner.  He will continue with current dose at this time.  Referral placed to endocrinology for comanagement of type 1 diabetes given patient's history of ketoacidosis etc.  Creatinine was noted to be elevated while in the hospital.  Will recheck that today.  Patient should return to clinic for his scheduled appointment in 2 weeks, or sooner with any new concerns.  - Basic Metabolic Panel  - Ambulatory referral to Endocrinology    3. Viral gastroenteritis  Symptoms of viral gastroenteritis have resolved.  Patient denies having any continued symptoms of vomiting or diarrhea.    4. Essential hypertension, benign  Blood pressure stable.  Continue with lisinopril 5 mg and metoprolol 100 mg twice daily for blood pressure management.     5. Chronic Kidney Disease, Stage 3  Reviewed history of chronic kidney disease.  Will check basic metabolic panel today as noted above.  -Basic Metabolic panel    6. Hyperlipidemia, unspecified hyperlipidemia type  Continue with simvastatin 20 mg by mouth at bedtime.    7. JENI (generalized anxiety disorder)  Stable on current regimen.  Continue follow-up with psychiatry.    Subjective:    Abdi Joseph is a 53-year-old male seen today for hospital discharge follow-up visit.  Past medical history includes type 1 diabetes, hypertension, CKD stage III, hyperlipidemia, hypothyroidism, anxiety.  He presented to [facility] emergency department on 2/26/2019 with symptoms of nausea, vomiting, abdominal pain and diarrhea.  He  was found to have DKA with markedly elevated blood glucose reading of 627, anion gap of 25, elevated beta hydroxybutyrate.  This was attributed to viral gastroenteritis.  He was admitted into the medical intensive care unit for initiation of an insulin drip.  This resolved his DKA.  Prehospital admission, patient was administering 31 units of Lantus at bedtime and pre-meal NovoLog sliding scale insulin.  Once his DKA was resolved, the dose of his Lantus was gradually increased as patient began to increase his oral intake.  Patient was doing well on 25 units of Lantus.  When it was increased to his normal 31 units, he developed hypoglycemia that morning.  He was evaluated by diabetes educator who recommended that he do pre-meal insulin dosing based on carb counting in addition to his regular sliding scale insulin however, patient declined this due to complexity and not feeling comfortable doing it.  He was discharged home before his insulin dose was increased back to up to his normal dose. He was discharged on 3/2/2019.  He has been continuing with 25 units daily of long-acting insulin along with 2-6 units of his regular insulin 3 times a day with meals.  He reports that blood sugars have been at goal between 100 and 150 before breakfast and before dinner.  He denies having any hypoglycemic episodes.  He feels most comfortable continuing with current dose of insulin at this time.  Patient previously followed with endocrinology.  Reports that his endocrinologist retired and he is wondering about a referral today.  He is feeling well.  Denies any continued symptoms of gastroenteritis.  No chest pain, shortness of breath.  He does not have any additional concerns today. Review of systems is as stated in HPI, and the remainder of the 10 system review is otherwise unremarkable.    Past Medical History, Family History, and Social History reviewed.   - Grecia  Tobacco: no  EtOH: [employer] Diet Coke a couple times per  "week with his wife (\"not a big drinker\")  Mom - alive 89  Dad -   Siblings: Juan Domínguez  Surgeries: right great toe amputation  Hospitalizations: osteomyelitis; \"lots of staph and MRSA infections\"; right TKA  Work: disability    Dr. Álvarez - psychiatrist  Dr. Jeuss Dos Santos - podiatrist  Dr. Menezes - internal medicine (followed x 30 years)  Past Surgical History:   Procedure Laterality Date     ABDOMINAL SURGERY       CHOLECYSTECTOMY       FOOT AMPUTATION Right 11/3/2017    Procedure:  amputation of right hallux, Sesamoid and partial 1st metatarsal;  Surgeon: Lamin Barnett DPM;  Location: [facility] [place];  Service:      KNEE ARTHROSCOPY      x3     refractory surgery       TYMPANOPLASTY W/ MASTOIDECTOMY  2012        Family History   Problem Relation Age of Onset     Heart disease Mother      Heart disease Father      Heart attack Father      Asthma Father      Diabetes Sister      Diabetes Brother      Heart attack Brother         Past Medical History:   Diagnosis Date     Anxiety      Arthritis      Cellulitis      Chronic Kidney Disease, Stage 3      Depression      Diabetes mellitus (H)      DKA (diabetic ketoacidoses) (H)      Gastroparesis      Hypertension      Hyponatremia      Hypothyroidism      MRSA (methicillin resistant Staphylococcus aureus)      Neuropathy (H)      Osteoarthritis Of The Knee      Panic disorder      PONV (postoperative nausea and vomiting)      Retinopathy due to secondary diabetes (H)      Thrombosis of superior sagittal sinus         Social History     Tobacco Use     Smoking status: Never Smoker     Smokeless tobacco: Never Used   Substance Use Topics     Alcohol use: Yes     Alcohol/week: 2.4 oz     Types: 4 Shots of liquor per week     Drug use: No        Current Outpatient Medications   Medication Sig Dispense Refill     acetaminophen (TYLENOL) 500 MG tablet Take 1-2 tablets (500-1,000 mg total) by mouth every 4 (four) hours as needed.  0     aspirin 81 MG EC tablet " "Take 81 mg by mouth daily.       blood glucose test strips Use 1 each As Directed as needed Dispense brand per patient's insurance at pharmacy discretion.. 200 strip 11     calcium, as carbonate, (TUMS) 200 mg calcium (500 mg) chewable tablet Chew 1 tablet 3 (three) times a day as needed for heartburn.       clonazePAM (KLONOPIN) 1 MG tablet Take 1 tablet (1 mg total) by mouth every morning. 4 tablet 0     clonazePAM (KLONOPIN) 2 MG tablet Take 2 mg by mouth at bedtime.       docusate sodium (COLACE) 100 MG capsule Take 100 mg by mouth 2 (two) times a day.       gabapentin (NEURONTIN) 100 MG capsule Take 100 mg by mouth every morning.       gabapentin (NEURONTIN) 400 MG capsule Take 400 mg by mouth bedtime.       insulin syringe-needle U-100 (INSULIN SYRINGE) 1 mL 29 gauge x 1/2\" Syrg Use 1 each As Directed as needed (with insulin). 200 each 11     LANTUS U-100 INSULIN 100 unit/mL injection Inject 25 Units under the skin at bedtime. 10 mL PRN     lisinopril (PRINIVIL,ZESTRIL) 5 MG tablet Take 1 tablet (5 mg total) by mouth daily. 90 tablet 3     magnesium chloride (SLOW-MAG) 64 mg TbEC delayed-release tablet Take 1 tablet (64 mg total) by mouth daily.  0     methocarbamol (ROBAXIN) 750 MG tablet Take 750 mg by mouth 3 (three) times a day as needed.        metoprolol tartrate (LOPRESSOR) 50 MG tablet Take 100 mg by mouth 2 (two) times a day.             mirtazapine (REMERON) 15 MG tablet Take 15 mg by mouth bedtime.        NOVOLIN R REGULAR U-100 INSULN 100 unit/mL injection Check blood sugar four (4) times daily.  10.65 Type 1 with hyperglycemia  No supplies needed  BD Ultra-fine 6 mm 31G 0.3 mL syringe - NDC [address]-27 - dispense 1 case, refill PRN for 1 year 10 mL PRN     NOVOLIN R REGULAR U-100 INSULN 100 unit/mL injection Check blood sugar four (4) times daily.  10.65 Type 1 with hyperglycemia  No supplies needed  BD Ultra-fine 6 mm 31G 0.3 mL syringe - NDC [address]-38 - dispense 1 case, refill PRN for 1 " "year 10 mL PRN     omeprazole (PRILOSEC) 20 MG capsule Take 1 capsule (20 mg total) by mouth 2 (two) times a day before meals. 60 capsule 5     ondansetron (ZOFRAN) 4 MG tablet Take 4 mg by mouth every 8 (eight) hours as needed for nausea.        pramipexole (MIRAPEX) 0.125 MG tablet Take 3 tablets (0.375 mg total) by mouth at bedtime. 90 tablet 5     QUEtiapine (SEROQUEL) 300 MG tablet Take 600 mg by mouth bedtime.       QUEtiapine (SEROQUEL) 50 MG tablet Take 50 mg by mouth 2 (two) times a day Take with breakfast and lunch..             sildenafil, antihypertensive, (REVATIO) 20 mg tablet Take 2-3 tablets (40-60 mg total) by mouth daily as needed. (Patient taking differently: Take 50 mg by mouth daily as needed.       ) 30 tablet 2     silver sulfADIAZINE (SILVADENE, SSD) 1 % cream Apply 1 application topically 2 (two) times a day.       simvastatin (ZOCOR) 20 MG tablet Take 1 tablet (20 mg total) by mouth at bedtime. At bedtime 90 tablet 3     traZODone (DESYREL) 50 MG tablet Take 2 tablets (100 mg total) by mouth at bedtime as needed for sleep.  0     No current facility-administered medications for this visit.      Facility-Administered Medications Ordered in Other Visits   Medication Dose Route Frequency Provider Last Rate Last Dose     bupivacaine-epinephrine (PF) 0.75 %-1:200,000 1.8 mL, EPINEPHrine 0.01 mL, fentaNYL pf 50 mcg/mL 0.5 mL    Continuous PRN WILLIAM Starkey MBBS   2.31 mL at 09/09/14 0728          Objective:    Vitals:    03/06/19 1320   BP: 110/60   Patient Site: Right Arm   Patient Position: Sitting   Cuff Size: Adult Regular   Pulse: 60   Weight: 191 lb 1.6 oz (86.7 kg)   Height: 5' 9\" (1.753 m)      Body mass index is 28.22 kg/m .      General Appearance:  Alert, cooperative, no distress, appears stated age   HEENT:  Normal.  No acute findings.   Neck: Supple, symmetrical, no adenopathy.   Lungs:   Clear to auscultation bilaterally, respirations unlabored.  No expiratory wheeze or " inspiratory crackles noted.   Heart:  Regular rate and rhythm, S1, S2 normal, no murmur, rub or gallop   Abdomen:   Soft, non-tender, positive bowel sounds, no masses, no organomegaly   Extremities: Extremities normal.  No cyanosis or edema   Skin: Warm, dry.  Skin color, texture, turgor normal, no rashes or lesions   Neurologic: Nonfocal.         This note has been dictated using voice recognition software. Any grammatical or context distortions are unintentional and inherent to the use of this software.

## 2021-06-24 NOTE — TELEPHONE ENCOUNTER
Medication Request  Medication name: Glucose Meter, test strips and lancets  Pharmacy Name and Location:  [facility]  Reason for request:  New pharmacy/ new insurance  When did you use medication last?:   n/a  Okay to leave a detailed message: yes

## 2021-06-24 NOTE — TELEPHONE ENCOUNTER
Question following Office Visit  When did you see your provider: 3/7  What is your question: Patient stated that [facility] does not cover the DexCon. Patient stated that he was told Clara Gilliam, PharmD would have to contact [facility] at [phone]  Okay to leave a detailed message: Yes, [phone]

## 2021-06-24 NOTE — TELEPHONE ENCOUNTER
"Who is calling:  PATIENT  Reason for Call:  REQUESTING TO SPEAK TO MD.   PT WAS HAVING AN ANNUAL EXAM IN IS HOME BY \" [facility]\" ([phone])  THE NURSE ADVISED  PATIENT TO CALL MD HE SHOULD GET ON \"ACE INHIBITOR\"  IT BECAUSE OF HIS KIDNEY ?  PLEASE CALL PATIENT.       Date of last appointment with primary care: 12/19/18   Has the patient been recently seen:  Yes  Okay to leave a detailed message: Yes    "

## 2021-06-24 NOTE — PATIENT INSTRUCTIONS - HE
Recommendations from today's PharmD medication management visit                                                       1. Remain off furosemide for now (water pill). Watch the swelling your feet and ankles. Use your compression stockings and elevate you feet.     2. Finish the 100 mg daytime gabapentin. When done, watch for worsening in hand pain and foot pain.     3. Continue base insulin:   2 units Breakfast (if just eggs, give 0 base)  4 units Lunch  4 units Dinner    Sliding scale  150-200: add 1  201-250: add 2  251-300: add 3  301-350: add 4  351-400: add 5    4. I will work on getting you the DexCom G6 or FreeStyle Aye    5. I will change the metoprolol to 100 mg -- take one in the morning and one in the evening.     6. Decrease pramipexole to 2 tablets (0.25 mg) -- I will send the 0.25 mg strength that way you can take one in the evening.     Next pharmacist visit: Ken in 1-2 weeks     My Pharmacist's contact information:                                                       It was a pleasure seeing you today to discuss your medications!  Please feel free to contact me with any questions or concerns you have. I look forward to seeing you again to help you get the most benefit from your medications!    To reschedule your PharmD appointment, please call the clinic directly or you may call the [facility] scheduling line at [phone] or toll-free at [phone]:   [facility] (available for appointments Mondays and Thursdays)  [facility] (available for appointments Tuesday, Wednesday & Friday)     Clara Gilliam, Stalin, BCACP  Medication Therapy Management Pharmacist  [facility]    You may receive a survey about the [facility] services you received by email and/or US Mail.  I would appreciate your feedback to help me serve you better in the future. Your comments will be anonymous.

## 2021-06-25 NOTE — TELEPHONE ENCOUNTER
Patient Returning Call  Reason for call:  Patient returning call  Information relayed to patient:  Patient stated I was told by my pharmacy to return this call to the clinic to ask Fernando Hannon MD  to appeal this.   Patient has additional questions:  Yes  If YES, what are your questions/concerns:  Patient stated Please also look into the test strips as these are not covered either.   Patient was advised this message was being forwarded to the MTM for follow up. Please return call to patient with plan.  Okay to leave a detailed message?: Yes

## 2021-06-25 NOTE — TELEPHONE ENCOUNTER
Called [employer], this request has been denied. Called [employer] to get the denial faxed to [facility].

## 2021-06-25 NOTE — PROGRESS NOTES
Abdi Joseph is a 56 y.o. male who is being evaluated via a billable telephone visit.      What phone number would you like to be contacted at? [phone]   How would you like to obtain your AVS? AVS Preference: Ken.  Phone call duration: 7 minutes    This document was created using a software with less than 100% fidelity, at times resulting in unintended, even erroneous syntax and grammar.  The reader is advised to keep this under consideration while reviewing, interpreting this note.           ASSESSMENT AND PLAN:    Diagnoses and all orders for this visit:    Seronegative rheumatoid arthritis of multiple sites (H)  -     methotrexate 2.5 MG tablet; Take 4 tablets (10 mg total) by mouth once a week.  Dispense: 48 tablet; Refill: 0    Bilateral carpal tunnel syndrome    LOREN positive    Stage 3b chronic kidney disease        Follow up in 3 months      HISTORY OF PRESENTING ILLNESS:  Abdi Joseph 56 y.o. is evaluated here via video/audio link. This patient is here for follow-up.  He has seronegative rheumatoid arthritis.  He has significant renal impairment.  He had positive LOREN.  He has made  significant improvement with a modest dose of methotrexate, 10 mg/week he takes folic acid.  He noted pain, numbness, burning sensation, waking up from sleep on regular occasions around 3 AM.  He has not observed swelling.  He is well described to have carpal tunnel syndrome clinically and electromyographically.  He is already tried braces without significant help.    He unfortunately already has erosions and beginning of osteoarthritis.  Since he cannot take nonsteroidals he is going to try acetaminophen half an hour or so prior to start playing.  For now we will stay the course with current dose of methotrexate, folic acid, follow-up in 3 months, labs before.      He would like to try corticosteroid injections for carpal tunnel symptom relief.  This will be arranged shortly.       ROS enquiry held for fever, ocular  symptoms, rash, headache,  GI issues.  Today we also discussed the issues related to the current pandemic, the pros and cons of the current treatment plan, the CDC guidelines such as social distancing washing the hands covering the cough.  ALLERGIES:Linezolid, Metoclopramide, and Prochlorperazine edisylate    PAST MEDICAL/ACTIVE PROBLEMS/MEDICATION/SOCIAL DATA  Past Medical History:   Diagnosis Date     Anxiety      Arthritis      Cellulitis      Chronic Kidney Disease, Stage 3      Depression      Diabetes mellitus (H)      DKA (diabetic ketoacidoses) (H)      Gastroparesis      Hypertension      Hyponatremia      Hypothyroidism      MRSA (methicillin resistant Staphylococcus aureus)      Neuropathy      Osteoarthritis Of The Knee      Panic disorder      PONV (postoperative nausea and vomiting)      Retinopathy due to secondary diabetes (H)      Thrombosis of superior sagittal sinus      Social History     Tobacco Use   Smoking Status Never Smoker   Smokeless Tobacco Never Used     Patient Active Problem List   Diagnosis     Acute Colitis     Dehydration (Na, H2O)     Diabetic Ulcer Of The Left Foot     Cellulitis     Cellulitis Of The Left Foot     Hypothyroidism, unspecified type     Hyponatremia     Anemia     JENI (generalized anxiety disorder)     Chronic Kidney Disease, Stage 3     Osteoarthritis Of The Knee     Type 1 diabetes mellitus with ketoacidosis (H)     Pneumonia     Abdominal Pain     Intractable vomiting     Hyperkalemia     Hyponatremia     Hypopyon ulcer     Acute-on-chronic kidney injury (H)     MRSA infection     Hypomagnesemia     Hypertension     Thrombosis, superior sagittal sinus     Cellulitis of right foot     Hyperglycemia     Acute kidney injury (H)     Diabetic ketoacidosis without coma associated with type 1 diabetes mellitus (H)     Lactic acidosis     Foot ulcer, right, with unspecified severity (H)     Gastrointestinal hemorrhage, unspecified gastrointestinal hemorrhage type      Cellulitis     Osteomyelitis of right foot (H)     ALBERTA (acute kidney injury) (H)     Hyperlipidemia, unspecified hyperlipidemia type     Essential hypertension, benign     Type 1 diabetes mellitus with kidney complication (H)     Acute gastroenteritis     Hospital discharge follow-up     Viral gastroenteritis     Hypoglycemia     Influenza A     Panic disorder     Depression     Anxiety     CKD (chronic kidney disease) stage 3, GFR 30-59 ml/min     Pneumonia due to infectious organism     Blood clots in brain     Diarrhea     Frequent falls     Gastroparesis     Localized edema     Major depressive disorder, recurrent episode, moderate (H)     Myoclonic jerking     Tremor     Optic neuritis     Polypharmacy     Septic arthritis (H)     DJD (degenerative joint disease) of knee     Seronegative rheumatoid arthritis of multiple sites (H)     LOREN positive     High risk medication use     Current Outpatient Medications   Medication Sig Dispense Refill     acetaminophen (TYLENOL) 500 MG tablet Take 1-2 tablets (500-1,000 mg total) by mouth every 4 (four) hours as needed.  0     aspirin 81 MG EC tablet Take 81 mg by mouth daily.       blood-glucose sensor (DEXCOM G6 SENSOR) Belia Use as directed for continuous glucose monitoring. 1 Device 13     blood-glucose transmitter (DEXCOM G6 TRANSMITTER) Belia Use as directed for continuous glucose monitoring. Change every 3 months 1 Device 4     calcium, as carbonate, (TUMS) 200 mg calcium (500 mg) chewable tablet Chew 1 tablet 3 (three) times a day as needed for heartburn.       clonazePAM (KLONOPIN) 0.5 MG tablet Take 0.5 mg by mouth daily before breakfast.       clonazePAM (KLONOPIN) 0.5 MG tablet Take 1.5 mg by mouth at bedtime.       docusate sodium (COLACE) 100 MG capsule Take 100 mg by mouth 2 (two) times a day.       folic acid (FOLVITE) 1 MG tablet TAKE 1 TABLET(1 MG) BY MOUTH DAILY 90 tablet 3     gabapentin (NEURONTIN) 400 MG capsule Take 2 capsules (800 mg total) by mouth  "3 (three) times a day. 180 capsule 2     insulin glargine (LANTUS SOLOSTAR U-100 INSULIN) 100 unit/mL (3 mL) pen Inject 27 Units under the skin at bedtime. (Patient taking differently: Inject 28 Units under the skin at bedtime. ) 25 mL 3     insulin syringe-needle U-100 0.3 mL 29 gauge Syrg USE ONCE DAILY 100 each 1     losartan (COZAAR) 50 MG tablet Take 1 tablet (50 mg total) by mouth 2 (two) times a day. 180 tablet 3     metoprolol tartrate (LOPRESSOR) 100 MG tablet Take 1 tablet (100 mg total) by mouth 2 (two) times a day. 180 tablet 3     NOVOLIN R REGULAR U-100 INSULN 100 unit/mL injection Check blood sugar four (4) times daily.  10.65 Type 1 with hyperglycemia  No supplies needed  BD Ultra-fine 6 mm 31G 0.3 mL syringe - NDC [address]-24 - dispense 1 case, refill PRN for 1 year (Patient taking differently: Inject 5-10 Units under the skin 3 (three) times a day before meals. Check blood sugar four (4) times daily.  10.65 Type 1 with hyperglycemia  No supplies needed  BD Ultra-fine 6 mm 31G 0.3 mL syringe - NDC [address]-43 - dispense 1 case, refill PRN for 1 year) 10 mL PRN     ondansetron (ZOFRAN) 4 MG tablet TAKE 1 TABLET (4 MG TOTAL) BY MOUTH EVERY 8 (EIGHT) HOURS AS NEEDED FOR NAUSEA. 20 tablet 3     ondansetron (ZOFRAN-ODT) 4 MG disintegrating tablet Take 1 tablet (4 mg total) by mouth every 8 (eight) hours as needed for nausea. 20 tablet 1     pantoprazole (PROTONIX) 40 MG tablet TAKE 1 TABLET(40 MG) BY MOUTH TWICE DAILY 180 tablet 3     pen needle, diabetic (BD ULTRA-FINE VICTORIANO PEN NEEDLE) 32 gauge x 5/32\" Ndle Use daily with insulin pen 100 each 6     pramipexole (MIRAPEX) 0.25 MG tablet Take 2 tablets (0.5 mg total) by mouth at bedtime. 28 tablet 4     QUEtiapine (SEROQUEL) 300 MG tablet Take 600 mg by mouth bedtime.       QUEtiapine (SEROQUEL) 50 MG tablet Take 50 mg by mouth daily. Take with breakfast and lunch.       rivaroxaban ANTICOAGULANT (XARELTO) 20 mg tablet Take 20 mg by mouth daily.       " sildenafil (VIAGRA) 100 MG tablet Take 1 tablet (100 mg total) by mouth as needed for erectile dysfunction. 30 tablet 2     simvastatin (ZOCOR) 20 MG tablet TAKE 1 TABLET(20 MG) BY MOUTH AT BEDTIME 90 tablet 3     traZODone (DESYREL) 100 MG tablet TAKE 2 TABLETS BY MOUTH AT BEDTIME FOR INSOMNIA       No current facility-administered medications for this visit.      Facility-Administered Medications Ordered in Other Visits   Medication Dose Route Frequency Provider Last Rate Last Admin     bupivacaine-epinephrine (PF) 0.75 %-1:200,000 1.8 mL, EPINEPHrine 0.01 mL, fentaNYL pf 50 mcg/mL 0.5 mL    Continuous PRN WILLIAM Starkey MBBS   2.31 mL at 09/09/14 0728         EXAMINATION:     On the phone sounded comfortable, alert, oriented, speech was fluent,  memory recall appeared normal, did not sound like was in pain or short of breath.        LAB / IMAGING DATA:  ALT   Date Value Ref Range Status   05/27/2021 12 0 - 45 U/L Final   03/01/2021 12 0 - 45 U/L Final   01/11/2021 16 0 - 45 U/L Final     Albumin   Date Value Ref Range Status   05/27/2021 3.6 3.5 - 5.0 g/dL Final   03/01/2021 3.5 3.5 - 5.0 g/dL Final   01/11/2021 3.5 3.5 - 5.0 g/dL Final     Creatinine   Date Value Ref Range Status   05/27/2021 2.47 (H) 0.70 - 1.30 mg/dL Final   03/01/2021 2.32 (H) 0.70 - 1.30 mg/dL Final   01/11/2021 2.37 (H) 0.70 - 1.30 mg/dL Final       WBC   Date Value Ref Range Status   05/27/2021 4.3 4.0 - 11.0 thou/uL Final   03/01/2021 7.0 4.0 - 11.0 thou/uL Final   06/13/2015 3.1 (L) 4.0 - 11.0 thou/uL Final   06/12/2015 3.3 (L) 4.0 - 11.0 thou/uL Final     Hemoglobin   Date Value Ref Range Status   05/27/2021 12.8 (L) 14.0 - 18.0 g/dL Final   03/01/2021 11.7 (L) 14.0 - 18.0 g/dL Final   01/11/2021 12.5 (L) 14.0 - 18.0 g/dL Final     Platelets   Date Value Ref Range Status   05/27/2021 123 (L) 140 - 440 thou/uL Final   03/01/2021 165 140 - 440 thou/uL Final   01/11/2021 179 140 - 440 thou/uL Final       Lab Results   Component  Value Date    RF <15.0 10/06/2020    SEDRATE 8 10/06/2020

## 2021-06-26 NOTE — TELEPHONE ENCOUNTER
Lauren Team      Pt calling for refill on...  - losartan (COZAAR) 50 MG tablet  - pantoprazole (PROTONIX) 40 MG tablet    Please send to:   [id number] - [place] [address] AT [place] [id number]

## 2021-06-26 NOTE — PROGRESS NOTES
This document was created using a software with less than 100% fidelity, at times resulting in unintended, even erroneous syntax and grammar.  The reader is advised to keep this under consideration while reviewing, interpreting this note.      ASSESSMENT AND PLAN:  Abdi Joseph 56 y.o. male is seen here on 06/09/21 for evaluation of this patient has numbness and tingling of both hands worse on the left side, carpal tunnel syndrome has been diagnosed clinically and EMG confirmation, background of diabetic neuropathy, we discussed options, he is already tried braces and not interested in surgery at this point, he is aware that given the complexity carpal tunnel injection with steroids may provide only partial relief, he wants to proceed, 20 mg of Kenalog injected under ultrasound guidance into the left carpal tunnel.  This was done after pros and cons were outlined.  Should this provide him with appreciable relief he would like to have the same done on the opposite hand.  Diagnoses and all orders for this visit:    Bilateral carpal tunnel syndrome  -     triamcinolone acetonide 40 mg/mL injection 20 mg (KENALOG-40)    Diabetic polyneuropathy associated with type 1 diabetes mellitus (H)          HISTORY OF PRESENTING ILLNESS ON 06/09/21 :  Abdi Joseph 56 y.o. is here for a moderately severe flare up of pain. Here for a moderately severe flare up of pain.  Joints affected include Bilateral hand numbness worse on the left side.. This has gone on for several weeks. Pain is described as sharp. It is worse with activity at times bedtime..  Her symptoms are moderately severe. The symptoms are progressive.  Associated findings include /do not include: swelling, rash.  There is no associated recent fall or trauma.  Over-the-counter treatment to date has been without significant relief.    Further historical information, including ROS and limitation in activities as noted in the multidimensional health assessment  questionnaire scanned in the EMR and in the assessment and plan section.    ALLERGIES:Linezolid, Metoclopramide, and Prochlorperazine edisylate    PAST MEDICAL/ACTIVE PROBLEMS/MEDICATION/SOCIAL DATA  Past Medical History:   Diagnosis Date     Anxiety      Arthritis      Cellulitis      Chronic Kidney Disease, Stage 3      Depression      Diabetes mellitus (H)      DKA (diabetic ketoacidoses) (H)      Gastroparesis      Hypertension      Hyponatremia      Hypothyroidism      MRSA (methicillin resistant Staphylococcus aureus)      Neuropathy      Osteoarthritis Of The Knee      Panic disorder      PONV (postoperative nausea and vomiting)      Retinopathy due to secondary diabetes (H)      Thrombosis of superior sagittal sinus      Social History     Tobacco Use   Smoking Status Never Smoker   Smokeless Tobacco Never Used     Patient Active Problem List   Diagnosis     Acute Colitis     Dehydration (Na, H2O)     Diabetic Ulcer Of The Left Foot     Cellulitis     Cellulitis Of The Left Foot     Hypothyroidism, unspecified type     Hyponatremia     Anemia     JENI (generalized anxiety disorder)     Chronic Kidney Disease, Stage 3     Osteoarthritis Of The Knee     Type 1 diabetes mellitus with ketoacidosis (H)     Pneumonia     Abdominal Pain     Intractable vomiting     Hyperkalemia     Hyponatremia     Hypopyon ulcer     Acute-on-chronic kidney injury (H)     MRSA infection     Hypomagnesemia     Hypertension     Thrombosis, superior sagittal sinus     Cellulitis of right foot     Hyperglycemia     Acute kidney injury (H)     Diabetic ketoacidosis without coma associated with type 1 diabetes mellitus (H)     Lactic acidosis     Foot ulcer, right, with unspecified severity (H)     Gastrointestinal hemorrhage, unspecified gastrointestinal hemorrhage type     Cellulitis     Osteomyelitis of right foot (H)     ALBERTA (acute kidney injury) (H)     Hyperlipidemia, unspecified hyperlipidemia type     Essential hypertension,  benign     Type 1 diabetes mellitus with kidney complication (H)     Acute gastroenteritis     Hospital discharge follow-up     Viral gastroenteritis     Hypoglycemia     Influenza A     Panic disorder     Depression     Anxiety     CKD (chronic kidney disease) stage 3, GFR 30-59 ml/min     Pneumonia due to infectious organism     Blood clots in brain     Diarrhea     Frequent falls     Gastroparesis     Localized edema     Major depressive disorder, recurrent episode, moderate (H)     Myoclonic jerking     Tremor     Optic neuritis     Polypharmacy     Septic arthritis (H)     DJD (degenerative joint disease) of knee     Seronegative rheumatoid arthritis of multiple sites (H)     LOREN positive     High risk medication use     Current Outpatient Medications   Medication Sig Dispense Refill     acetaminophen (TYLENOL) 500 MG tablet Take 1-2 tablets (500-1,000 mg total) by mouth every 4 (four) hours as needed.  0     aspirin 81 MG EC tablet Take 81 mg by mouth daily.       blood-glucose sensor (DEXCOM G6 SENSOR) Belia Use as directed for continuous glucose monitoring. 1 Device 13     blood-glucose transmitter (DEXCOM G6 TRANSMITTER) Belia Use as directed for continuous glucose monitoring. Change every 3 months 1 Device 4     calcium, as carbonate, (TUMS) 200 mg calcium (500 mg) chewable tablet Chew 1 tablet 3 (three) times a day as needed for heartburn.       clonazePAM (KLONOPIN) 0.5 MG tablet Take 0.5 mg by mouth daily before breakfast.       clonazePAM (KLONOPIN) 0.5 MG tablet Take 1.5 mg by mouth at bedtime.       docusate sodium (COLACE) 100 MG capsule Take 100 mg by mouth 2 (two) times a day.       folic acid (FOLVITE) 1 MG tablet TAKE 1 TABLET(1 MG) BY MOUTH DAILY 90 tablet 3     gabapentin (NEURONTIN) 400 MG capsule Take 2 capsules (800 mg total) by mouth 3 (three) times a day. 180 capsule 2     insulin glargine (LANTUS SOLOSTAR U-100 INSULIN) 100 unit/mL (3 mL) pen Inject 27 Units under the skin at bedtime.  "(Patient taking differently: Inject 28 Units under the skin at bedtime. ) 25 mL 3     insulin syringe-needle U-100 0.3 mL 29 gauge Syrg USE ONCE DAILY 100 each 1     losartan (COZAAR) 50 MG tablet Take 1 tablet (50 mg total) by mouth 2 (two) times a day. 180 tablet 3     methotrexate 2.5 MG tablet Take 4 tablets (10 mg total) by mouth once a week. 48 tablet 0     metoprolol tartrate (LOPRESSOR) 100 MG tablet Take 1 tablet (100 mg total) by mouth 2 (two) times a day. 180 tablet 3     NOVOLIN R REGULAR U-100 INSULN 100 unit/mL injection Check blood sugar four (4) times daily.  10.65 Type 1 with hyperglycemia  No supplies needed  BD Ultra-fine 6 mm 31G 0.3 mL syringe - NDC [address]-65 - dispense 1 case, refill PRN for 1 year (Patient taking differently: Inject 5-10 Units under the skin 3 (three) times a day before meals. Check blood sugar four (4) times daily.  10.65 Type 1 with hyperglycemia  No supplies needed  BD Ultra-fine 6 mm 31G 0.3 mL syringe - NDC [address]-08 - dispense 1 case, refill PRN for 1 year) 10 mL PRN     ondansetron (ZOFRAN) 4 MG tablet TAKE 1 TABLET (4 MG TOTAL) BY MOUTH EVERY 8 (EIGHT) HOURS AS NEEDED FOR NAUSEA. 20 tablet 3     ondansetron (ZOFRAN-ODT) 4 MG disintegrating tablet Take 1 tablet (4 mg total) by mouth every 8 (eight) hours as needed for nausea. 20 tablet 1     pantoprazole (PROTONIX) 40 MG tablet TAKE 1 TABLET(40 MG) BY MOUTH TWICE DAILY 180 tablet 3     pen needle, diabetic (BD ULTRA-FINE VICTORIANO PEN NEEDLE) 32 gauge x 5/32\" Ndle Use daily with insulin pen 100 each 6     pramipexole (MIRAPEX) 0.25 MG tablet Take 2 tablets (0.5 mg total) by mouth at bedtime. 28 tablet 4     QUEtiapine (SEROQUEL) 300 MG tablet Take 600 mg by mouth bedtime.       QUEtiapine (SEROQUEL) 50 MG tablet Take 50 mg by mouth daily. Take with breakfast and lunch.       rivaroxaban ANTICOAGULANT (XARELTO) 20 mg tablet Take 20 mg by mouth daily.       sildenafil (VIAGRA) 100 MG tablet Take 1 tablet (100 mg total) " by mouth as needed for erectile dysfunction. 30 tablet 2     simvastatin (ZOCOR) 20 MG tablet TAKE 1 TABLET(20 MG) BY MOUTH AT BEDTIME 90 tablet 3     traZODone (DESYREL) 100 MG tablet TAKE 2 TABLETS BY MOUTH AT BEDTIME FOR INSOMNIA       No current facility-administered medications for this visit.      Facility-Administered Medications Ordered in Other Visits   Medication Dose Route Frequency Provider Last Rate Last Admin     bupivacaine-epinephrine (PF) 0.75 %-1:200,000 1.8 mL, EPINEPHrine 0.01 mL, fentaNYL pf 50 mcg/mL 0.5 mL    Continuous PRN WILLIAM Starkey MBBS   2.31 mL at 09/09/14 0728         DETAILED EXAMINATION  06/09/21  :  Vitals:    06/09/21 1633   BP: 130/80   Pulse: 78     Alert oriented. Head including the face is examined for malar rash, heliotropes, scarring, lupus pernio. Eyes examined for redness such as in episcleritis/scleritis, periorbital lesions.   Neck/ Face examined for parotid gland swelling, range of motion of neck.  Left upper and lower and right upper and lower extremities examined for tenderness, swelling, warmth of the appendicular joints, range of motion, edema, rash.  Some of the important findings included: Positive Tinel on the left, no synovitis of the wrists, left ring finger distal phalanx nail dystrophy.           LAB / IMAGING DATA:  ALT   Date Value Ref Range Status   05/27/2021 12 0 - 45 U/L Final   03/01/2021 12 0 - 45 U/L Final   01/11/2021 16 0 - 45 U/L Final     Albumin   Date Value Ref Range Status   05/27/2021 3.6 3.5 - 5.0 g/dL Final   03/01/2021 3.5 3.5 - 5.0 g/dL Final   01/11/2021 3.5 3.5 - 5.0 g/dL Final     Creatinine   Date Value Ref Range Status   05/27/2021 2.47 (H) 0.70 - 1.30 mg/dL Final   03/01/2021 2.32 (H) 0.70 - 1.30 mg/dL Final   01/11/2021 2.37 (H) 0.70 - 1.30 mg/dL Final       WBC   Date Value Ref Range Status   05/27/2021 4.3 4.0 - 11.0 thou/uL Final   03/01/2021 7.0 4.0 - 11.0 thou/uL Final   06/13/2015 3.1 (L) 4.0 - 11.0 thou/uL Final    06/12/2015 3.3 (L) 4.0 - 11.0 thou/uL Final     Hemoglobin   Date Value Ref Range Status   05/27/2021 12.8 (L) 14.0 - 18.0 g/dL Final   03/01/2021 11.7 (L) 14.0 - 18.0 g/dL Final   01/11/2021 12.5 (L) 14.0 - 18.0 g/dL Final     Platelets   Date Value Ref Range Status   05/27/2021 123 (L) 140 - 440 thou/uL Final   03/01/2021 165 140 - 440 thou/uL Final   01/11/2021 179 140 - 440 thou/uL Final       Lab Results   Component Value Date    RF <15.0 10/06/2020    SEDRATE 8 10/06/2020

## 2021-06-27 ENCOUNTER — HEALTH MAINTENANCE LETTER (OUTPATIENT)
Age: 56
End: 2021-06-27

## 2021-06-28 NOTE — PROGRESS NOTES
"Progress Notes by Angeli Gaxiola, Diabetes Ed at 3/24/2020  1:00 PM     Author: Angeli Gaxiola, Diabetes Ed Service: -- Author Type: Diabetes Ed    Filed: 3/25/2020 12:26 PM Encounter Date: 3/24/2020 Status: Attested    : Angeli Gaxiola, Diabetes Ed (Diabetes Ed) Cosigner: Fernando Hannon MD at 3/25/2020  3:19 PM    Attestation signed by Fernando Hannon MD at 3/25/2020  3:19 PM                     Convert to telephone visit-30 minutes  Spoke with Abdi.  Walked him through starting  and setting time and date.  Discussed entering code from sensor kit as this will mean he does not have to calibrate.  Next, reviewed entering the transmitter serial number.  Walked Abdi through attaching the sensor to his body, we did listen to the video posted on the [employer] website as well as talk through each step of the process.  Abdi was able to attach the transmitter to the sensor without trouble.    Discussed sensors are changed every 10 days and need to remember to \"stop\" old sensor before will be able to \"start\" new sensor.  Set high alert at 250 and low alert at 80.    All additional questions and concerns addressed today.    Will follow-up next week and help patient try to download  from home.       " Anesthesia Pre Eval Note  36 y/o  at 36+2 with cHTN and superimposed pre-eclampsia with severe features by headache.  Denies hx of asthma, thyroid, liver or kidney disease.  No coagulopathy disorder, not on anti-coagulants.  No hx of anesthesia related complications.  Mets>4    OB/Gyn Evaluation    OB/Gyn:  Patient is pregnant now.     Pregnancy Associated Diseases:           Anesthesia ROS/Med Hx          Cardiovascular Review:    Positive for hypertension    End/Other Review:  Positive for diabetes  Positive for obesity class III - 40.00 - 49.99  Positive for anemia    Overall Review of Systems Comments:  Crohn Disease  Additional Results:     ALLERGIES:  No Known Allergies       Lab Results       Component                Value               Date                       WBC                      11.1 (H)            2021                 WBC                      8.7                 10/21/2020                 RBC                      3.93 (L)            2021                 RBC                      4.82                10/21/2020                 HGB                      7.7 (L)             2021                 HGB                      9.5 (L)             10/21/2020                 HCT                      26.4 (L)            2021                 HCT                      33.3 (L)            10/21/2020                 MCHC                     29.2 (L)            2021                 MCHC                     28.5 (L)            10/21/2020                 SODIUM                   136                 2021                 SODIUM                   139                 10/21/2020                 POTASSIUM                3.8                 2021                 POTASSIUM                4.2                 10/21/2020                 CHLORIDE                 104                 2021                 CHLORIDE                 107                 10/21/2020                 CO2                       22                  2021                 CO2                      25                  10/21/2020                 GLUCOSE                  97                  2021                 GLUCOSE                  65                  10/21/2020                 BUN                      7                   2021                 BUN                      6                   10/21/2020                 CREATININE               0.62                2021                 CREATININE               0.56                10/21/2020                 GFRESTIMATE              >90                 2021                 GFRA                     >90                 10/21/2020                 GFRNA                    >90                 10/21/2020                 CALCIUM                  9.2                 2021                 CALCIUM                  9.1                 10/21/2020                 PLT                      284                 2021                 PLT                      349                 10/21/2020                 PTT                      32 (H)              01/10/2016                 INR                      0.9                 01/10/2016             Past Medical History:  No date: Anemia  No date: Crohn's disease (CMS/[facility])  No date: Essential (primary) hypertension  No date: Gestational diabetes    Past Surgical History:  No date: Cautery anal fissure, folowup  No date:  section, low transverse       Prior to Admission medications :  Medication labetalol (NORMODYNE) 200 MG tablet, Sig Take 1 tablet by mouth 2 times daily., Start Date 21, End Date , Taking? , Authorizing Provider Celine Peguero MD    Medication Prenatal Vit-Fe Fumarate-FA (Prenatal Vitamins) 28-0.8 MG Tab, Sig Take 1 tablet by mouth daily., Start Date 21, End Date , Taking? , Authorizing Provider Outside Provider    Medication ondansetron (Zofran) 4 MG tablet, Sig Take 1 tablet by mouth every 8  hours as needed for Nausea., Start Date 2/10/21, End Date , Taking? , Authorizing Provider Celine Peguero MD    Medication ondansetron (ZOFRAN) 24 MG tablet, Sig Take 4 mg by mouth 1 time., Start Date , End Date , Taking? , Authorizing Provider Outside Provider    Medication labetalol (NORMODYNE) 100 MG tablet, Sig Take 100 mg by mouth 2 times daily., Start Date , End Date , Taking? , Authorizing Provider Outside Provider    Medication clotrimazole (LOTRIMIN) 1 % cream, Sig Apply topically 2 times daily., Start Date 1/6/21, End Date , Taking? , Authorizing Provider Valerio Nolan MD    Medication Calcium Carb-Cholecalciferol (Calcium 500 + D3) 500-600 MG-UNIT Tab, Sig Take 1 tablet by mouth daily., Start Date 12/2/20, End Date , Taking? , Authorizing Provider Elvira Ramos MD    Medication  MG capsule, Sig TK ONE C PO D PRF CONSPITATION, Start Date 7/22/20, End Date , Taking? , Authorizing Provider Outside Provider    Medication Ferrous Sulfate (IRON) 325 (65 Fe) MG Tab, Sig Take 1 tablet by mouth 3 times daily. Take supplement along with orange juice or vitamin C supplement. Avoid taking iron along with prenatal vitamins., Start Date 7/22/20, End Date , Taking? , Authorizing Provider Dayna Palumbo DO    Medication Prenatal Vit-Fe Fumarate-FA (PRENATAL 19) 29-1 MG Chew Tab, Sig Chew 1 tablet by mouth daily., Start Date 7/21/20, End Date , Taking? , Authorizing Provider Bello Rinaldi MD    Medication pyridoxine (B-6) 25 MG tablet, Sig Take 1 tablet by mouth 3 times daily. For nausea in pregnancy, Start Date 7/21/20, End Date , Taking? , Authorizing Provider Bello Rinaldi MD            Relevant Problems   No relevant active problems       Physical Exam     Airway   Mallampati: II  TM Distance: >3 FB  Neck ROM: Full    Cardiovascular  Cardiovascular exam normal  Cardio Rate: Normal    Head Assessment  Head assessment: Normocephalic and Atraumatic    General Assessment  General Assessment: Alert and  oriented    Dental Exam  Dental exam normal      Anesthesia Plan:  Anesthesia Plan    ASA Status: 3    Anesthesia Type: Spinal    Induction: Intravenous  Preferred Airway Type: Nasal Cannula      Post-op Pain Management: Per Surgeon      Checklist  Reviewed: Lab Results, Consultations, DNR Status, Past Med History, NPO Status, Patient Summary, Beta Blocker Status, Allergies, Medications, Outside Records, Nursing Notes, Care Everywhere and Problem list  Monitors  Discussed risks of the following Invasive monitors with patient: Arterial Line      Informed Consent  The proposed anesthetic plan, including its risks and benefits, have been discussed with the Patient  - along with the risks and benefits of alternatives.  Questions were encouraged and answered and the patient and/or representative understands and agrees to proceed.     Informed Consent for Blood: Consented    Consent/Risks Discussed Statement:  I have discussed with the patient, and/or patient's legal representative, the nature and purpose of anesthesia for the planned procedure. I have discussed elements of the patient's history or examination, as noted above and/or as follows, that place the patient at higher risk of complications - BMI> 30 (obesity) -    I discussed with the patient (and/or patient's legal representative) the risks and benefits of the proposed anesthesia plan, Spinal, which may include services performed by other anesthesia providers.    Alternative anesthesia plans, if available, were reviewed with the patient (and/or patient's legal representative). Discussion has been held with the patient (and/or patient's legal representative) regarding risks of anesthesia, which include emergent situations that may require change in anesthesia plan, as well as the following: allergic reaction, anxiety, aspiration, headache, need for blood transfusion, hypotension, ICU admit, post-op intubation, sore throat, nausea, memory loss, dental injury,  depressed breathing and conversion to general anesthesia    The patient (and/or patient's legal representative) has indicated understanding, his/her questions have been answered, and he/she wishes to proceed with the planned anesthetic.

## 2021-07-03 NOTE — ADDENDUM NOTE
Addendum Note by Olivia Martin CMA at 7/24/2019  9:38 AM     Author: Olivia Martin CMA Service: -- Author Type: Certified Medical Assistant    Filed: 7/24/2019  9:38 AM Encounter Date: 7/24/2019 Status: Signed    : Olivia Martin CMA (Certified Medical Assistant)    Addended by: OLIVIA MARTIN on: 7/24/2019 09:38 AM        Modules accepted: Orders

## 2021-07-03 NOTE — ADDENDUM NOTE
Addendum Note by Clara Gilliam, PharmNERIS at 11/4/2019  7:59 AM     Author: Clara Gilliam PharmD Service: -- Author Type: Pharmacist    Filed: 11/4/2019  7:59 AM Encounter Date: 10/14/2019 Status: Signed    : Clara Gilliam PharmD (Pharmacist)    Addended by: CLARA GILLIAM on: 11/4/2019 07:59 AM        Modules accepted: Orders

## 2021-07-04 NOTE — ADDENDUM NOTE
Addendum Note by Toy Sibley MD at 6/17/2021  6:48 PM     Author: Toy Sibley MD Service: -- Author Type: Physician    Filed: 6/17/2021  6:48 PM Encounter Date: 6/17/2021 Status: Signed    : Toy Sibley MD (Physician)    Addended by: TOY SIBLEY on: 6/17/2021 06:48 PM        Modules accepted: Orders

## 2021-07-06 VITALS — SYSTOLIC BLOOD PRESSURE: 130 MMHG | DIASTOLIC BLOOD PRESSURE: 80 MMHG | HEART RATE: 78 BPM

## 2021-07-07 ENCOUNTER — COMMUNICATION - HEALTHEAST (OUTPATIENT)
Dept: ENDOCRINOLOGY | Facility: CLINIC | Age: 56
End: 2021-07-07

## 2021-07-07 DIAGNOSIS — E10.9 TYPE 1 DIABETES MELLITUS (H): ICD-10-CM

## 2021-07-10 ENCOUNTER — COMMUNICATION - HEALTHEAST (OUTPATIENT)
Dept: FAMILY MEDICINE | Facility: CLINIC | Age: 56
End: 2021-07-10

## 2021-07-10 DIAGNOSIS — E78.5 HYPERLIPIDEMIA, UNSPECIFIED HYPERLIPIDEMIA TYPE: ICD-10-CM

## 2021-07-10 RX ORDER — SIMVASTATIN 20 MG
TABLET ORAL
Qty: 90 TABLET | Refills: 1 | Status: SHIPPED | OUTPATIENT
Start: 2021-07-10

## 2021-07-10 NOTE — TELEPHONE ENCOUNTER
Telephone Encounter by Cynthia Yates RN at 7/10/2021  9:00 AM     Author: Cynthia Yates RN Service: -- Author Type: Registered Nurse    Filed: 7/10/2021  9:02 AM Encounter Date: 7/10/2021 Status: Signed    : Cynthia Yates RN (Registered Nurse)       Refill Approved    Rx renewed per Medication Renewal Policy. Medication was last renewed on 7/28/2020.    Cynthia Yates, [facility] Connection Triage/Med Refill 7/10/2021     Requested Prescriptions   Pending Prescriptions Disp Refills   ? simvastatin (ZOCOR) 20 MG tablet [Pharmacy Med Name: SIMVASTATIN 20MG TABLETS] 90 tablet 3     Sig: TAKE 1 TABLET(20 MG) BY MOUTH AT BEDTIME       Statins Refill Protocol (Hmg CoA Reductase Inhibitors) Passed - 7/10/2021  5:45 AM        Passed - PCP or prescribing provider visit in past 12 months      Last office visit with prescriber/PCP: 12/2/2020 Fernando Hannon MD OR same dept: 12/2/2020 Fernando Hannon MD OR same specialty: 12/2/2020 Fernando Hannon MD  Last physical: 11/19/2019 Last MTM visit: Visit date not found   Next visit within 3 mo: Visit date not found  Next physical within 3 mo: Visit date not found  Prescriber OR PCP: Fernando Hannon MD  Last diagnosis associated with med order: 1. Hyperlipidemia, unspecified hyperlipidemia type  - simvastatin (ZOCOR) 20 MG tablet [Pharmacy Med Name: SIMVASTATIN 20MG TABLETS]; TAKE 1 TABLET(20 MG) BY MOUTH AT BEDTIME  Dispense: 90 tablet; Refill: 3    If protocol passes may refill for 12 months if within 3 months of last provider visit (or a total of 15 months).

## 2021-07-20 ENCOUNTER — OFFICE VISIT (OUTPATIENT)
Dept: RHEUMATOLOGY | Facility: CLINIC | Age: 56
End: 2021-07-20
Payer: COMMERCIAL

## 2021-07-20 VITALS
DIASTOLIC BLOOD PRESSURE: 80 MMHG | BODY MASS INDEX: 33.3 KG/M2 | HEART RATE: 72 BPM | SYSTOLIC BLOOD PRESSURE: 138 MMHG | WEIGHT: 225.5 LBS

## 2021-07-20 DIAGNOSIS — G56.03 BILATERAL CARPAL TUNNEL SYNDROME: ICD-10-CM

## 2021-07-20 DIAGNOSIS — M06.09 SERONEGATIVE RHEUMATOID ARTHRITIS OF MULTIPLE SITES (H): Primary | ICD-10-CM

## 2021-07-20 DIAGNOSIS — R76.8 ANA POSITIVE: ICD-10-CM

## 2021-07-20 DIAGNOSIS — E10.42 DIABETIC POLYNEUROPATHY ASSOCIATED WITH TYPE 1 DIABETES MELLITUS (H): ICD-10-CM

## 2021-07-20 DIAGNOSIS — N18.32 STAGE 3B CHRONIC KIDNEY DISEASE (H): ICD-10-CM

## 2021-07-20 PROCEDURE — 99214 OFFICE O/P EST MOD 30 MIN: CPT | Mod: 25 | Performed by: INTERNAL MEDICINE

## 2021-07-20 PROCEDURE — 20526 THER INJECTION CARP TUNNEL: CPT | Mod: RT | Performed by: INTERNAL MEDICINE

## 2021-07-20 RX ORDER — TRIAMCINOLONE ACETONIDE 40 MG/ML
20 INJECTION, SUSPENSION INTRA-ARTICULAR; INTRAMUSCULAR ONCE
Status: COMPLETED | OUTPATIENT
Start: 2021-07-20 | End: 2021-07-20

## 2021-07-20 RX ADMIN — TRIAMCINOLONE ACETONIDE 20 MG: 40 INJECTION, SUSPENSION INTRA-ARTICULAR; INTRAMUSCULAR at 17:45

## 2021-07-20 NOTE — PROGRESS NOTES
Abdi is a 56 year old male presents today for follow-up.        Abdi was seen today for recheck.    Diagnoses and all orders for this visit:    Seronegative rheumatoid arthritis of multiple sites (H)  -     methotrexate 2.5 MG tablet; Take 4 tablets (10 mg) by mouth once a week    Bilateral carpal tunnel syndrome  -     INJECTION THERAPEUTIC, CARPAL TUNNEL  -     triamcinolone (KENALOG-40) injection 20 mg    Diabetic polyneuropathy associated with type 1 diabetes mellitus (H)    LOREN positive    Stage 3b chronic kidney disease            After pros and cons were discussed including risk of infection, bleeding, skin changes including thinning, pigmentary alteration and scarring to name a few, Carpal Tunnel on the right side was injected as noted in the orders section. This was done with nontouch technique.  This was done under ultrasound guidance.  The patient tolerated the procedure well and had a brisk Marcaine effect.  The postinjection care was discussed.      Follow up in 3 months.        HPI  Abdi Joseph is a 56 year old maleis here for follow-up.  This is for seronegative rheumatoid arthritis.  He has significant renal impairment.  He had positive LOREN.  He has made very significant improvement with a modest dose of methotrexate, 10 mg/week, in view of his renal impairment.  On his previous visit he had corticosteroid injection for carpal tunnel syndrome on his left side and found that helpful this was in the background of diabetic neuropathy as well.  He has enough improvement that he wonders if the same can be done for the right hand.   He is able to play his guitar better than before.  He has noted low back pain, this is in the lumbar region, this is worse with walking, he has to stop and sit down before the pain relates ago.  He has not been seen in spine clinic.  For now we will stay the course with current dose of methotrexate, folic acid, follow-up in 3 months, labs before.                    Patient  Active Problem List    Diagnosis Date Noted     Seronegative rheumatoid arthritis of multiple sites (H) 03/04/2021     Priority: Medium     LOREN positive 03/04/2021     Priority: Medium     High risk medication use 03/04/2021     Priority: Medium     Pneumonia due to infectious organism 02/25/2020     Priority: Medium     Type 1 diabetes mellitus with ketoacidosis (H)      Priority: Medium     Created by Conversion  Replacement Utility updated for latest IMO load         Panic disorder 02/23/2020     Priority: Medium     Depression 02/23/2020     Priority: Medium     Anxiety 02/23/2020     Priority: Medium     CKD (chronic kidney disease) stage 3, GFR 30-59 ml/min 02/23/2020     Priority: Medium     Chronic Kidney Disease, Stage 3      Priority: Medium     Created by Conversion         Influenza A 02/22/2020     Priority: Medium     Hypoglycemia 11/19/2019     Priority: Medium     Hospital discharge follow-up 03/06/2019     Priority: Medium     Viral gastroenteritis 03/06/2019     Priority: Medium     Acute gastroenteritis      Priority: Medium     Type 1 diabetes mellitus with kidney complication (H) 02/26/2019     Priority: Medium     Hyperlipidemia, unspecified hyperlipidemia type 12/12/2018     Priority: Medium     Essential hypertension, benign 12/12/2018     Priority: Medium     Hypothyroidism, unspecified type      Priority: Medium     Created by Conversion  Replacement Utility updated for latest IMO load         JENI (generalized anxiety disorder)      Priority: Medium     Created by Conversion         Localized edema 12/19/2017     Priority: Medium     ALBERTA (acute kidney injury) (H) 11/03/2017     Priority: Medium     Cellulitis 11/02/2017     Priority: Medium     Osteomyelitis of right foot (H) 11/02/2017     Priority: Medium     Added automatically from request for surgery [id number]         Foot ulcer, right, with unspecified severity (H)      Priority: Medium     Diabetic ketoacidosis without coma associated  with type 1 diabetes mellitus (H) 06/30/2017     Priority: Medium     Acute kidney injury (H)      Priority: Medium     Lactic acidosis      Priority: Medium     Gastrointestinal hemorrhage, unspecified gastrointestinal hemorrhage type      Priority: Medium     MRSA infection      Priority: Medium     Cellulitis of right foot 12/25/2016     Priority: Medium     Hyperglycemia      Priority: Medium     Osteoarthritis Of The Knee      Priority: Medium     Created by Conversion  Replacement Utility updated for latest IMO load         Abdominal Pain      Priority: Medium     Created by Conversion  Replacement Utility updated for latest IMO load         Polypharmacy 01/16/2016     Priority: Medium     Blood clots in brain 06/15/2015     Priority: Medium     Thrombosis, 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     Priority: Medium     Frequent falls 04/06/2015     Priority: Medium     Tremor 04/06/2015     Priority: Medium     Hyperkalemia 03/17/2015     Priority: Medium     Hyponatremia 03/17/2015     Priority: Medium     Acute-on-chronic kidney injury (H) 03/17/2015     Priority: Medium     Hypomagnesemia 03/17/2015     Priority: Medium     Hypertension      Priority: Medium     Hypopyon ulcer 01/29/2015     Priority: Medium     Intractable vomiting 12/18/2014     Priority: Medium     Pneumonia      Priority: Medium     Created by Conversion         DJD (degenerative joint disease) of knee 05/19/2014     Priority: Medium     Acute Colitis      Priority: Medium     Created by Conversion         Dehydration (Na, H2O)      Priority: Medium     Created by Conversion         Diabetic Ulcer Of The Left Foot      Priority: Medium     Created by Conversion         Cellulitis      Priority: Medium     Created by Conversion         Cellulitis Of The Left Foot      Priority: Medium     Created by Conversion         Hyponatremia      Priority: Medium     Created by Conversion         Anemia      Priority: Medium     Created by  Conversion         Myoclonic jerking 01/20/2014     Priority: Medium     Septic arthritis (H) 05/07/2009     Priority: Medium     Optic neuritis 03/25/2009     Priority: Medium     Diarrhea 10/27/2008     Priority: Medium     Major depressive disorder, recurrent episode, moderate (H) 08/01/2007     Priority: Medium     Gastroparesis 06/09/2007     Priority: Medium     Past Surgical History:   Procedure Laterality Date     ABDOMEN SURGERY       AMPUTATE FOOT Right 11/3/2017    Procedure:  amputation of right hallux, Sesamoid and partial 1st metatarsal;  Surgeon: Lamin Barnett DPM;  Location: [facility] [place];  Service:      ARTHROSCOPY KNEE      x3     CHOLECYSTECTOMY       IR MISCELLANEOUS PROCEDURE  2/17/2006     IR MISCELLANEOUS PROCEDURE  2/24/2006     IR MISCELLANEOUS PROCEDURE  11/17/2006     IR NG TUBE PLACEMENT REQ RAD & FLUORO  11/17/2006     OTHER SURGICAL HISTORY      refractory surgery     TYMPANOPLASTY W/ MASTOIDECTOMY  2012      Past Medical History:   Diagnosis Date     Anxiety      Arthritis      Cellulitis and abscess of unspecified site      Chronic kidney disease, stage III (moderate)      Depression      Diabetes mellitus (H)      DKA (diabetic ketoacidoses) (H)      Gastroparesis      Hypertension      Hyponatremia      MRSA (methicillin resistant Staphylococcus aureus)      Neuropathy      Osteoarthrosis, unspecified whether generalized or localized, lower leg      Panic disorder      PONV (postoperative nausea and vomiting)      Retinopathy due to secondary diabetes (H)      Thrombosis of superior sagittal sinus      Unspecified hypothyroidism      Allergies   Allergen Reactions     Linezolid Nausea and Vomiting     Per ID note from 12/28/16     Metoclopramide Other (See Comments)     nightmares, nightmares     Prochlorperazine Edisylate [Prochlorperazine] Anxiety     Tolerates Phenergan      Current Outpatient Medications   Medication Sig Dispense Refill     acetaminophen (TYLENOL) 500  MG tablet [ACETAMINOPHEN (TYLENOL) 500 MG TABLET] Take 1-2 tablets (500-1,000 mg total) by mouth every 4 (four) hours as needed.  0     blood-glucose sensor (DEXCOM G6 SENSOR) Daron [BLOOD-GLUCOSE SENSOR (DEXCOM G6 SENSOR) DARON] Use as directed for continuous glucose monitoring. 1 Device 13     blood-glucose transmitter (DEXCOM G6 TRANSMITTER) Daron [BLOOD-GLUCOSE TRANSMITTER (DEXCOM G6 TRANSMITTER) DARON] Use as directed for continuous glucose monitoring. Change every 3 months 1 Device 4     calcium, as carbonate, (TUMS) 200 mg calcium (500 mg) chewable tablet [CALCIUM, AS CARBONATE, (TUMS) 200 MG CALCIUM (500 MG) CHEWABLE TABLET] Chew 1 tablet 3 (three) times a day as needed for heartburn.       clonazePAM (KLONOPIN) 0.5 MG tablet [CLONAZEPAM (KLONOPIN) 0.5 MG TABLET] Take 0.5 mg by mouth daily before breakfast.       docusate sodium (COLACE) 100 MG capsule [DOCUSATE SODIUM (COLACE) 100 MG CAPSULE] Take 100 mg by mouth 2 (two) times a day.       folic acid (FOLVITE) 1 MG tablet [FOLIC ACID (FOLVITE) 1 MG TABLET] TAKE 1 TABLET(1 MG) BY MOUTH DAILY 90 tablet 3     gabapentin (NEURONTIN) 400 MG capsule [GABAPENTIN (NEURONTIN) 400 MG CAPSULE] Take 2 capsules (800 mg total) by mouth 3 (three) times a day. 180 capsule 2     insulin glargine (LANTUS SOLOSTAR U-100 INSULIN) 100 unit/mL (3 mL) pen [INSULIN GLARGINE (LANTUS SOLOSTAR U-100 INSULIN) 100 UNIT/ML (3 ML) PEN] Inject 27 Units under the skin at bedtime. 25 mL 3     insulin syringe-needle U-100 0.3 mL 29 gauge Syrg [INSULIN SYRINGE-NEEDLE U-100 0.3 ML 29 GAUGE SYRG] USE TO INJECT INSULIN ONCE DAILY 100 each 1     insulin syringe-needle U-100 0.3 mL 29 gauge Syrg [INSULIN SYRINGE-NEEDLE U-100 0.3 ML 29 GAUGE SYRG] USE ONCE DAILY 100 each 1     losartan (COZAAR) 50 MG tablet [LOSARTAN (COZAAR) 50 MG TABLET] Take 1 tablet (50 mg total) by mouth 2 (two) times a day. 180 tablet 3     methotrexate 2.5 MG tablet Take 4 tablets (10 mg) by mouth once a week 48 tablet 0      "metoprolol tartrate (LOPRESSOR) 100 MG tablet [METOPROLOL TARTRATE (LOPRESSOR) 100 MG TABLET] Take 1 tablet (100 mg total) by mouth 2 (two) times a day. 180 tablet 3     NOVOLIN R REGULAR U-100 INSULN 100 unit/mL injection [NOVOLIN R REGULAR U-100 INSULN 100 UNIT/ML INJECTION] Check blood sugar four (4) times daily.  10.65 Type 1 with hyperglycemia  No supplies needed  BD Ultra-fine 6 mm 31G 0.3 mL syringe - NDC [address]-48 - dispense 1 case, refill PRN for 1 year 10 mL PRN     ondansetron (ZOFRAN) 4 MG tablet [ONDANSETRON (ZOFRAN) 4 MG TABLET] TAKE 1 TABLET (4 MG TOTAL) BY MOUTH EVERY 8 (EIGHT) HOURS AS NEEDED FOR NAUSEA. 20 tablet 3     pantoprazole (PROTONIX) 40 MG tablet [PANTOPRAZOLE (PROTONIX) 40 MG TABLET] TAKE 1 TABLET(40 MG) BY MOUTH TWICE DAILY 180 tablet 3     pen needle, diabetic (BD ULTRA-FINE VICTORIANO PEN NEEDLE) 32 gauge x 5/32\" Ndle [PEN NEEDLE, DIABETIC (BD ULTRA-FINE VICTORIANO PEN NEEDLE) 32 GAUGE X 5/32\" NDLE] Use daily with insulin pen 100 each 6     pramipexole (MIRAPEX) 0.25 MG tablet [PRAMIPEXOLE (MIRAPEX) 0.25 MG TABLET] Take 2 tablets (0.5 mg total) by mouth at bedtime. 28 tablet 4     QUEtiapine (SEROQUEL) 300 MG tablet [QUETIAPINE (SEROQUEL) 300 MG TABLET] Take 600 mg by mouth bedtime.       rivaroxaban ANTICOAGULANT (XARELTO) 20 mg tablet [RIVAROXABAN ANTICOAGULANT (XARELTO) 20 MG TABLET] Take 20 mg by mouth daily.       sildenafil (VIAGRA) 100 MG tablet [SILDENAFIL (VIAGRA) 100 MG TABLET] Take 1 tablet (100 mg total) by mouth as needed for erectile dysfunction. 30 tablet 2     simvastatin (ZOCOR) 20 MG tablet [SIMVASTATIN (ZOCOR) 20 MG TABLET] TAKE 1 TABLET(20 MG) BY MOUTH AT BEDTIME 90 tablet 1     traZODone (DESYREL) 100 MG tablet [TRAZODONE (DESYREL) 100 MG TABLET] TAKE 2 TABLETS BY MOUTH AT BEDTIME FOR INSOMNIA       aspirin 81 MG EC tablet [ASPIRIN 81 MG EC TABLET] Take 81 mg by mouth daily. (Patient not taking: Reported on 7/20/2021)       family history includes Asthma in his father; " Coronary Artery Disease in his brother and father; Diabetes in his brother and sister; Heart Disease in his father and mother.     Social Connections:      Frequency of Communication with Friends and Family:      Frequency of Social Gatherings with Friends and Family:      Attends [employer] Services:      Active Member of Clubs or Organizations:      Attends Club or Organization Meetings:      Marital Status:         DETAILED EXAMINATION  07/20/21  :  Vitals:    07/20/21 1652   BP: 138/80   Pulse: 72   Weight: 102.3 kg (225 lb 8 oz)     Alert oriented. Head including the face is examined for malar rash, heliotropes, scarring, lupus pernio. Eyes examined for redness such as in episcleritis/scleritis, periorbital lesions.   Neck/ Face examined for parotid gland swelling, range of motion of neck.  Left upper and lower and right upper and lower extremities examined for tenderness, swelling, warmth of the appendicular joints, range of motion, edema, rash.  Some of the important findings included: he does not have evidence of synovitis in the palpable joints of the upper extremities.  No significant deformities of the digits.     Status post right TKA.  Tinel's positive right side wrist.          WBC   Date Value Ref Range Status   05/27/2021 4.3 4.0 - 11.0 thou/uL Final     RBC Count   Date Value Ref Range Status   05/27/2021 4.30 (L) 4.40 - 6.20 mill/uL Final     Hemoglobin   Date Value Ref Range Status   05/27/2021 12.8 (L) 14.0 - 18.0 g/dL Final     Hematocrit   Date Value Ref Range Status   05/27/2021 38.9 (L) 40.0 - 54.0 % Final     MCV   Date Value Ref Range Status   05/27/2021 91 80 - 100 fL Final     MCH   Date Value Ref Range Status   05/27/2021 29.8 27.0 - 34.0 pg Final     Platelet Count   Date Value Ref Range Status   05/27/2021 123 (L) 140 - 440 thou/uL Final     % Lymphocytes   Date Value Ref Range Status   10/06/2020 26 20 - 40 % Final     AST   Date Value Ref Range Status   12/02/2020 10 0 - 40 U/L Final      ALT   Date Value Ref Range Status   05/27/2021 12 0 - 45 U/L Final     Albumin   Date Value Ref Range Status   05/27/2021 3.6 3.5 - 5.0 g/dL Final     Alkaline Phosphatase   Date Value Ref Range Status   12/02/2020 118 45 - 120 U/L Final     Creatinine   Date Value Ref Range Status   05/27/2021 2.47 (H) 0.70 - 1.30 mg/dL Final     GFR Estimate   Date Value Ref Range Status   05/27/2021 27 (L) >60 mL/min/1.73m2 Final     GFR Estimate If Black   Date Value Ref Range Status   05/27/2021 33 (L) >60 mL/min/1.73m2 Final     Erythrocyte Sedimentation Rate   Date Value Ref Range Status   10/06/2020 8 0 - 15 mm/hr Final     CRP   Date Value Ref Range Status   10/06/2020 0.4 0.0 - 0.8 mg/dL Final

## 2021-08-06 NOTE — PATIENT INSTRUCTIONS - HE
Patient Instructions by Fernando Hannon MD at 11/19/2019  8:20 AM     Author: Fernando Hannon MD Service: -- Author Type: Physician    Filed: 11/19/2019  8:43 AM Encounter Date: 11/19/2019 Status: Signed    : Fernando Hannon MD (Physician)         Patient Education     Your Health Risk Assessment indicates you feel you are not in good physical health.    A healthy lifestyle helps keep the body fit and the mind alert. It helps protect you from disease, helps you fight disease, and helps prevent chronic disease (disease that doesn't go away) from getting worse. This is important as you get older and begin to notice twinges in muscles and joints and a decline in the strength and stamina you once took for granted. A healthy lifestyle includes good healthcare, good nutrition, weight control, recreation, and regular exercise. Avoid harmful substances and do what you can to keep safe. Another part of a healthy lifestyle is stay mentally active and socially involved.    Good healthcare     Have a wellness visit every year.     If you have new symptoms, let us know right away. Don't wait until the next checkup.     Take medicines exactly as prescribed and keep your medicines in a safe place. Tell us if your medicine causes problems.   Healthy diet and weight control     Eat 3 or 4 small, nutritious, low-fat, high-fiber meals a day. Include a variety of fruits, vegetables, and whole-grain foods.     Make sure you get enough calcium in your diet. Calcium, vitamin D, and exercise help prevent osteoporosis (bone thinning).     If you live alone, try eating with others when you can. That way you get a good meal and have company while you eat it.     Try to keep a healthy weight. If you eat more calories than your body uses for energy, it will be stored as fat and you will gain weight.     Recreation   Recreation is not limited to sports and team events. It includes any activity that provides relaxation, interest,  enjoyment, and exercise. Recreation provides an outlet for physical, mental, and social energy. It can give a sense of worth and achievement. It can help you stay healthy.       Patient Education   Understanding USDA MyPlate  The USDA (US Department of Agriculture) has guidelines to help you make healthy food choices. These are called MyPlate. MyPlate shows the food groups that make up healthy meals using the image of a place setting. Before you eat, think about the healthiest choices for what to put onto your plate or into your cup or bowl. To learn more about building a healthy plate, visit www.choosemyplate.gov.       The Food Groups    Fruits: Any fruit or 100% fruit juice counts as part of the Fruit Group. Fruits may be fresh, canned, frozen, or dried, and may be whole, cut-up, or pureed. Make half your plate fruits and vegetables.    Vegetables: Any vegetable or 100% vegetable juice counts as a member of the Vegetable Group. Vegetables may be fresh, frozen, canned, or dried. They can be served raw or cooked and may be whole, cut-up, or mashed. Make half your plate fruits and vegetables.     Grains: All foods made from grains are part of the Grains Group. These include wheat, rice, oats, cornmeal, and barley such as bread, pasta, oatmeal, cereal, tortillas, and grits. Grains should be no more than a quarter of your plate. At least half of your grains should be whole grains.    Protein: This group includes meat, poultry, seafood, beans and peas, eggs, processed soy products (like tofu), nuts (including nut butters), and seeds. Make protein choices no more than a quarter of your plate. Meat and poultry choices should be lean or low fat.    Dairy: All fluid milk products and foods made from milk that contain calcium, like yogurt and cheese are part of the Dairy Group. (Foods that have little calcium, such as cream, butter, and cream cheese, are not part of the group.) Most dairy choices should be low-fat or  fat-free.    Oils: These are fats that are liquid at room temperature. They include canola, corn, olive, soybean, and sunflower oil. Foods that are mainly oil include mayonnaise, certain salad dressings, and soft margarines. You should have only 5 to 7 teaspoons of oils a day. You probably already get this much from the food you eat.  Use [employer] to Help Build Your Meals  The SuperTracker can help you plan and track your meals and activity. You can look up individual foods to see or compare their nutritional value. You can get guidelines for what and how much you should eat. You can compare your food choices. And you can assess personal physical activities and see ways you can improve. Go to www.MyFrontSteps.gov/supertracker/.    6704-4781 [employer]. [address] [place] [address]. All rights reserved. This information is not intended as a substitute for professional medical care. Always follow your healthcare professional's instructions.           Patient Education   Instrumental Activities of Daily Living  Your Health Risk Assessment indicates you have difficulties with instrumental activities of daily living which include laundry, housekeeping, banking, shopping, using the telephone, food preparation, transportation, or taking your own medications. Please make a follow up appointment for us to address this issue in more detail.    [facility] has resources available on the following website: [email or web address]     Also, here is a local agency that provides help with meals and other assistance:   [facility]: 572.531.6816     Patient Education   Your Health Risk Assessment indicates you feel you are not in good emotional health.    Recreation   Recreation is not limited to sports and team events. It includes any activity that provides relaxation, interest, enjoyment, and exercise. Recreation provides an outlet for physical, mental, and social energy.  It can give a sense of worth and achievement. It can help you stay healthy.    Mental Exercise and Social Involvement  Mental and emotional health is as important as physical health. Keep in touch with friends and family. Stay as active as possible. Continue to learn and challenge yourself.   Things you can do to stay mentally active are:    Learn something new, like a foreign language or musical instrument.     Play SCRABBLE or do crossword puzzles. If you cannot find people to play these games with you at home, you can play them with others on your computer through the Internet.     Join a games club--anything from card games to chess or checkers or lawn bowling.     Start a new hobby.     Go back to school.     Volunteer.     Read.     Keep up with world events.       Patient Education   Depression and Suicide in Older Adults  Nearly 2 million older Americans have some type of depression. Sadly, some of them even take their own lives. Yet depression among older adults is often ignored. Learn the warning signs. You may help spare a loved one needless pain. You may also save a life.       What Is Depression?  Depression is a mood disorder that affects the way you think and feel. The most common symptom is a feeling of deep sadness. People who are depressed also may seem tired and listless. And nothing seems to give them pleasure. Its normal to grieve or be sad sometimes. But sadness lessens or passes with time. Depression rarely goes away or improves on its own. Other symptoms of depression are:    Sleeping more or less than normal    Eating more or less than normal    Having headaches, stomachaches, or other pains that dont go away    Feeling nervous, empty, or worthless    Crying a great deal    Thinking or talking about suicide or death    Feeling confused or forgetful  What Causes It?  The causes of depression arent fully known. Certain chemicals in the brain play a role. Depression does run in families. And  life stresses can also trigger depression in some people. That may be the case with older adults. They often face great burdens, such as the death of friends or a spouse. They may have failing health. And they are more likely to be alone, lonely, or poor.  How You Can Help  Often, depressed people may not want to ask for help. When they do, they may be ignored. Or, they may receive the wrong treatment. You can help by showing parents and older friends love and support. If they seem depressed, help them find the right treatment. Talk to your doctor. Or contact a local mental health center, social service agency, or hospital. With modern treatment, no one has to suffer from depression.  Resources:    National Springer of Mental Health  327.201.7431  www.nimh.nih.gov    National [place] on Mental Illness  295.715.5702  www.christie.org    M[facility] Health [facility]  429.612.5447  www.[facility].org    National Suicide Hotline  896.302.1109 (800-SUICIDE)      3876-5807 [employer]. [address] [place] [address]. All rights reserved. This information is not intended as a substitute for professional medical care. Always follow your healthcare professional's instructions.         Patient Education   Understanding Advance Care Planning  Advance care planning is the process of deciding ones own future medical care. It helps ensure that if you cant speak for yourself, your wishes can still be carried out. The plan is a series of legal documents that note a persons wishes. The documents vary by state. Advance care planning may be done when a person has a serious illness that is expected to get worse. It may be done before major surgery. And it can help you and your family be prepared in case of a major illness or injury. Advance care planning helps with making decisions at these times.       A health care proxy is a person who acts as the voice of a patient when the patient cant speak for himself or herself. The  name of this role varies by state. It may be called a Durable Medical Power of  or Durable Power of  for Healthcare. It may be called an agent, surrogate, or advocate. Or it may be called a representative or decision maker. It is an official duty that is identified by a legal document. The document also varies by state.    Why Is Advance Care Planning Important?  If a person communicates their healthcare wishes:    They will be given medical care that matches their values and goals.    Their family members will not be forced to make decisions in a crisis with no guidance.  Creating a Plan  Making an advance care plan is often done in 3 steps:    Thinking about ones wishes. To create an advance care plan, you should think about what kind of medical treatment you would want if you lose the ability to communicate. Are there any situations in which you would refuse or stop treatment? Are there therapies you would want or not want? And whom do you want to make decisions for you? There are many places to learn more about how to plan for your care. Ask your doctor or  for resources.    Picking a health care proxy. This means choosing a trusted person to speak for you only when you cant speak for yourself. When you cannot make medical decisions, your proxy makes sure the instructions in your advance care plan are followed. A proxy does not make decisions based on his or her own opinions. They must put aside those opinions and values if needed, and carry out your wishes.    Filling out the legal documents. There are several kinds of legal documents for advance care planning. Each one tells health care providers your wishes. The documents may vary by state. They must be signed and may need to be witnessed or notarized. You can cancel or change them whenever you wish. Depending on your state, the documents may include a Healthcare Proxy form, Living Will, Durable Medical Power of , Advance  Directive, or others.  The Familys Role  The best help a family can give is to support their loved ones wishes. Open and honest communication is vital. Family should express any concerns they have about the patients choices while the patient can still make decisions.    7470-2622 [employer]. [address] [place] [address]. All rights reserved. This information is not intended as a substitute for professional medical care. Always follow your healthcare professional's instructions.         Also, [facility] offers a free, downloadable health care directive that allows you to share your treatment choices and personal preferences if you cannot communicate your wishes. It also allows you to appoint another person (called a health care agent) to make health care decisions if you are unable to do so. You can download an advance directive by going here: [email or web address]     Patient Education   Personalized Prevention Plan  You are due for the preventive services outlined below.  Your care team is available to assist you in scheduling these services.  If you have already completed any of these items, please share that information with your care team to update in your medical record.  Health Maintenance   Topic Date Due   ? HEPATITIS C SCREENING  1965   ? LIPID  1965   ? DIABETIC EYE EXAM  1965   ? HIV SCREENING  03/27/1979   ? MEDICARE ANNUAL WELLNESS VISIT  03/27/1982   ? TD 18+ HE  03/27/1982   ? TDAP ADULT ONE TIME DOSE  03/27/1982   ? ADVANCE CARE PLANNING  03/27/1982   ? COLONOSCOPY  03/27/2014   ? ZOSTER VACCINES (1 of 2) 03/27/2014   ? PNEUMOCOCCAL IMMUNIZATION 19-64 HIGHEST RISK (2 of 3 - PCV13) 04/24/2014   ? DIABETIC FOOT EXAM  11/02/2018   ? INFLUENZA VACCINE RULE BASED (1) 08/01/2019   ? DEPRESSION FOLLOW UP  01/17/2020   ? A1C  01/17/2020   ? MICROALBUMIN  02/21/2020   ? BMP  07/17/2020

## 2021-08-20 ENCOUNTER — TRANSFERRED RECORDS (OUTPATIENT)
Dept: HEALTH INFORMATION MANAGEMENT | Facility: CLINIC | Age: 56
End: 2021-08-20

## 2021-08-20 LAB — RETINOPATHY: NORMAL

## 2021-08-31 ENCOUNTER — TELEPHONE (OUTPATIENT)
Dept: LAB | Facility: CLINIC | Age: 56
End: 2021-08-31

## 2021-08-31 DIAGNOSIS — E10.29: Primary | ICD-10-CM

## 2021-08-31 NOTE — PROGRESS NOTES
Abdi has a lab appointment on 9/7 for lab for Lauren.  Please place appropriate orders.  If no orders are needed, please advise pt.    Thanks

## 2021-09-09 ENCOUNTER — LAB (OUTPATIENT)
Dept: LAB | Facility: CLINIC | Age: 56
End: 2021-09-09
Payer: COMMERCIAL

## 2021-09-09 DIAGNOSIS — E10.29: ICD-10-CM

## 2021-09-09 LAB — HBA1C MFR BLD: 6.6 % (ref 0–5.6)

## 2021-09-09 PROCEDURE — 83036 HEMOGLOBIN GLYCOSYLATED A1C: CPT

## 2021-09-09 PROCEDURE — 36415 COLL VENOUS BLD VENIPUNCTURE: CPT

## 2021-10-16 ENCOUNTER — HEALTH MAINTENANCE LETTER (OUTPATIENT)
Age: 56
End: 2021-10-16

## 2021-11-07 DIAGNOSIS — E10.319 TYPE 1 DIABETES MELLITUS WITH RETINOPATHY OF BOTH EYES, MACULAR EDEMA PRESENCE UNSPECIFIED, UNSPECIFIED RETINOPATHY SEVERITY (H): ICD-10-CM

## 2021-11-08 DIAGNOSIS — E10.319 TYPE 1 DIABETES MELLITUS WITH RETINOPATHY OF BOTH EYES, MACULAR EDEMA PRESENCE UNSPECIFIED, UNSPECIFIED RETINOPATHY SEVERITY (H): ICD-10-CM

## 2021-11-08 RX ORDER — INSULIN GLARGINE 100 [IU]/ML
INJECTION, SOLUTION SUBCUTANEOUS
Qty: 27 ML | OUTPATIENT
Start: 2021-11-08

## 2021-11-10 ENCOUNTER — TELEPHONE (OUTPATIENT)
Dept: RHEUMATOLOGY | Facility: CLINIC | Age: 56
End: 2021-11-10
Payer: COMMERCIAL

## 2021-11-10 DIAGNOSIS — M06.4 INFLAMMATORY POLYARTHRITIS (H): ICD-10-CM

## 2021-11-10 RX ORDER — INSULIN GLARGINE 100 [IU]/ML
INJECTION, SOLUTION SUBCUTANEOUS
Qty: 15 ML | Refills: 0 | Status: SHIPPED | OUTPATIENT
Start: 2021-11-10 | End: 2021-12-29

## 2021-11-10 RX ORDER — FOLIC ACID 1 MG/1
TABLET ORAL
Qty: 90 TABLET | Refills: 1 | Status: SHIPPED | OUTPATIENT
Start: 2021-11-10

## 2021-11-10 NOTE — TELEPHONE ENCOUNTER
[facility] Call Center    Phone Message    May a detailed message be left on voicemail: yes     Reason for Call: Medication Refill Request    Has the patient contacted the pharmacy for the refill? Yes     Name of medication being requested:   * LANTUS SOLOSTAR 100 UNIT/ML soln     Provider who prescribed the medication: Faiza    Pharmacy: [id number] [address] AT [facility] [address]    Date medication is needed: 11/10/2021     Patient is Out of medication and has an appt with Dr. Kendall on 11/16/2021    Action Taken: Message routed to:  [facility] ([facility] Endo    Travel Screening: Not Applicable

## 2021-11-10 NOTE — TELEPHONE ENCOUNTER
Please call patient and offer set up follow up appointment patient will be due December to February. As 's schedule is filling up.

## 2021-11-16 ENCOUNTER — VIRTUAL VISIT (OUTPATIENT)
Dept: ENDOCRINOLOGY | Facility: CLINIC | Age: 56
End: 2021-11-16
Payer: COMMERCIAL

## 2021-11-16 DIAGNOSIS — E10.29 TYPE 1 DIABETES MELLITUS WITH OTHER KIDNEY COMPLICATION (H): Primary | ICD-10-CM

## 2021-11-16 PROBLEM — E11.42 DIABETIC PERIPHERAL NEUROPATHY (H): Status: ACTIVE | Noted: 2021-04-06

## 2021-11-16 PROBLEM — R60.0 BILATERAL LEG EDEMA: Status: ACTIVE | Noted: 2020-11-18

## 2021-11-16 PROBLEM — K59.09 CHRONIC CONSTIPATION: Status: ACTIVE | Noted: 2021-09-24

## 2021-11-16 PROBLEM — E66.01 MORBID EXOGENOUS OBESITY (H): Status: ACTIVE | Noted: 2020-11-18

## 2021-11-16 PROBLEM — E10.10 DIABETIC KETOACIDOSIS WITHOUT COMA ASSOCIATED WITH TYPE 1 DIABETES MELLITUS (H): Status: RESOLVED | Noted: 2017-06-30 | Resolved: 2021-11-16

## 2021-11-16 PROCEDURE — 99214 OFFICE O/P EST MOD 30 MIN: CPT | Mod: 95 | Performed by: NURSE PRACTITIONER

## 2021-11-16 RX ORDER — ONDANSETRON 4 MG/1
4 TABLET, ORALLY DISINTEGRATING ORAL
COMMUNITY
Start: 2020-12-02

## 2021-11-16 RX ORDER — POLYETHYLENE GLYCOL 3350 17 G/17G
17 POWDER, FOR SOLUTION ORAL
COMMUNITY

## 2021-11-16 RX ORDER — IBUPROFEN 200 MG
TABLET ORAL
COMMUNITY
Start: 2021-08-05

## 2021-11-16 RX ORDER — FAMOTIDINE 20 MG/1
TABLET, FILM COATED ORAL
COMMUNITY
Start: 2021-08-11

## 2021-11-16 RX ORDER — MOXIFLOXACIN 5 MG/ML
1 SOLUTION/ DROPS OPHTHALMIC
COMMUNITY

## 2021-11-16 RX ORDER — CALCIUM CARBONATE 500(1250)
1 TABLET,CHEWABLE ORAL
COMMUNITY

## 2021-11-16 NOTE — Clinical Note
11/16/2021         RE: Abdi Joseph  [address]  [facility] [address]        Dear Colleague,    Thank you for referring your patient, Abdi Joseph, to the [facility]. Please see a copy of my visit note below.    Abdi is a 56 year old who is being evaluated via a billable telephone visit.      What phone number would you like to be contacted at? [phone]  How would you like to obtain your AVS? Mail a copy  Phone call duration: *** minutes    Date of last OV: 3/23/21  Reason for Visit: DM      Blood Glucose Log: Has a Dexcom - not connected and not able to connect remotely.  Needs in person visits in the future.    11/16  93      Again, thank you for allowing me to participate in the care of your patient.        Sincerely,        Beatris Kendall NP

## 2021-11-16 NOTE — PROGRESS NOTES
Abdi is a 56 year old who is being evaluated via a billable telephone visit.      What phone number would you like to be contacted at? [phone]  How would you like to obtain your AVS? Mail a copy     [facility] ENDOCRINOLOGY    Diabetes Note 11/17/2021    Abdi Joseph, 1965, [id number]          Reason for visit      1. Type 1 diabetes mellitus with other kidney complication (H)        HPI     Abdi Joseph is a very pleasant 56 year old old male who presents for follow up.  SUMMARY:      Abdi is contacted today in f/u for DM 1. His current A1c is 6.6 and about what it was the last time at 6.5. He reports that he had a GI bug two weeks ago, but has recovered from it. He is taking Lantus, 28 units daily. He is also using Novolin R pens and will take 15 units with meals as needed. He reports that he takes it about 30 min before he eats and this seems to work very well for him.  He is using the Dexcom CGM to monitor his BG and really likes it. We do not have a download today, unfortunately.         Blood Glucose Log: Has a Dexcom - not connected and not able to connect remotely.  Needs in person visits in the future.    11/16  93      Past Medical History     Patient Active Problem List   Diagnosis     Acute Colitis     Dehydration (Na, H2O)     Diabetic Ulcer Of The Left Foot     Cellulitis     Cellulitis Of The Left Foot     Hypothyroidism, unspecified type     Hyponatremia     Anemia     JENI (generalized anxiety disorder)     Chronic Kidney Disease, Stage 3     Osteoarthritis Of The Knee     Type 1 diabetes mellitus with ketoacidosis (H)     Pneumonia     Abdominal Pain     Intractable vomiting     Hyperkalemia     Hyponatremia     Hypopyon ulcer     Acute-on-chronic kidney injury (H)     MRSA infection     Hypomagnesemia     Hypertension     Thrombosis, superior sagittal sinus     Cellulitis of right foot     Hyperglycemia     Acute kidney injury (H)     Lactic acidosis     Foot ulcer, right,  with unspecified severity (H)     Gastrointestinal hemorrhage, unspecified gastrointestinal hemorrhage type     Cellulitis     Osteomyelitis of right foot (H)     ALBERTA (acute kidney injury) (H)     Hyperlipidemia, unspecified hyperlipidemia type     Essential hypertension, benign     Type 1 diabetes mellitus with kidney complication (H)     Acute gastroenteritis     Hospital discharge follow-up     Viral gastroenteritis     Hypoglycemia     Influenza A     Panic disorder     Depression     Anxiety     CKD (chronic kidney disease) stage 3, GFR 30-59 ml/min (H)     Pneumonia due to infectious organism     Blood clots in brain     Diarrhea     Frequent falls     Gastroparesis     Localized edema     Major depressive disorder, recurrent episode, moderate (H)     Myoclonic jerking     Tremor     Optic neuritis     Polypharmacy     Septic arthritis (H)     DJD (degenerative joint disease) of knee     Seronegative rheumatoid arthritis of multiple sites (H)     LOREN positive     High risk medication use     Bilateral leg edema     Chronic constipation     Diabetic peripheral neuropathy (H)     Encounter for long-term (current) use of high-risk medication     Frozen shoulder     Morbid exogenous obesity (H)        Family History       family history includes Asthma in his father; Coronary Artery Disease in his brother and father; Diabetes in his brother and sister; Heart Disease in his father and mother.    Social History            Review of Systems     Patient has no polyuria or polydipsia, no chest pain, dyspnea or TIA's, no numbness, tingling or pain in extremities  Remainder negative except as noted in HPI.      Vital Signs     There were no vitals taken for this visit.  Wt Readings from Last 3 Encounters:   07/20/21 102.3 kg (225 lb 8 oz)   12/02/20 103 kg (227 lb)   11/11/20 98 kg (216 lb)       Physical Exam           Assessment     1. Type 1 diabetes mellitus with other kidney complication (H)        Plan     Abdi's  DM control is quite stable. He will continue with his current regimen and f/u with me in 6 months, sooner with problems or concerns.         Beatris Kendall NP  [facility]  11/17/2021  6:24 AM          Lab Results     Microalbumin Urine mg/dL   Date Value Ref Range Status   03/04/2020 44.43 (H) 0.00 - 1.99 mg/dL Final       Cholesterol   Date Value Ref Range Status   11/19/2019 124 <=199 mg/dL Final     Direct Measure HDL   Date Value Ref Range Status   11/19/2019 40 >=40 mg/dL Final     Triglycerides   Date Value Ref Range Status   11/19/2019 148 <=149 mg/dL Final             Current Medications     Outpatient Medications Prior to Visit   Medication Sig Dispense Refill     ondansetron (ZOFRAN-ODT) 4 MG ODT tab Take 4 mg by mouth       acetaminophen (TYLENOL) 500 MG tablet [ACETAMINOPHEN (TYLENOL) 500 MG TABLET] Take 1-2 tablets (500-1,000 mg total) by mouth every 4 (four) hours as needed.  0     aspirin 81 MG EC tablet [ASPIRIN 81 MG EC TABLET] Take 81 mg by mouth daily. (Patient not taking: Reported on 7/20/2021)       blood-glucose sensor (DEXCOM G6 SENSOR) Daron [BLOOD-GLUCOSE SENSOR (DEXCOM G6 SENSOR) DARON] Use as directed for continuous glucose monitoring. 1 Device 13     blood-glucose transmitter (DEXCOM G6 TRANSMITTER) Daron [BLOOD-GLUCOSE TRANSMITTER (DEXCOM G6 TRANSMITTER) DARON] Use as directed for continuous glucose monitoring. Change every 3 months 1 Device 4     calcium carbonate 500 mg, elemental, 1250 (500 Ca) MG tablet chewable Take 1 tablet by mouth       calcium, as carbonate, (TUMS) 200 mg calcium (500 mg) chewable tablet [CALCIUM, AS CARBONATE, (TUMS) 200 MG CALCIUM (500 MG) CHEWABLE TABLET] Chew 1 tablet 3 (three) times a day as needed for heartburn.       clonazePAM (KLONOPIN) 0.5 MG tablet [CLONAZEPAM (KLONOPIN) 0.5 MG TABLET] Take 0.5 mg by mouth daily before breakfast.       docusate sodium (COLACE) 100 MG capsule [DOCUSATE SODIUM (COLACE) 100 MG CAPSULE] Take 100 mg by mouth 2 (two)  "times a day.       famotidine (PEPCID) 20 MG tablet        folic acid (FOLVITE) 1 MG tablet TAKE 1 TABLET(1 MG) BY MOUTH DAILY 90 tablet 1     gabapentin (NEURONTIN) 400 MG capsule [GABAPENTIN (NEURONTIN) 400 MG CAPSULE] Take 2 capsules (800 mg total) by mouth 3 (three) times a day. 180 capsule 2     Insulin Syringe 29G X 1/2\" 0.3 ML MISC USE TO INJECT INSULIN ONCE DAILY       insulin syringe-needle U-100 0.3 mL 29 gauge Syrg [INSULIN SYRINGE-NEEDLE U-100 0.3 ML 29 GAUGE SYRG] USE TO INJECT INSULIN ONCE DAILY 100 each 1     insulin syringe-needle U-100 0.3 mL 29 gauge Syrg [INSULIN SYRINGE-NEEDLE U-100 0.3 ML 29 GAUGE SYRG] USE ONCE DAILY 100 each 1     LANTUS SOLOSTAR 100 UNIT/ML soln ADMINISTER 27 UNITS UNDER THE SKIN AT BEDTIME 15 mL 0     losartan (COZAAR) 50 MG tablet [LOSARTAN (COZAAR) 50 MG TABLET] Take 1 tablet (50 mg total) by mouth 2 (two) times a day. 180 tablet 3     methotrexate 2.5 MG tablet Take 4 tablets (10 mg) by mouth once a week 48 tablet 0     metoprolol tartrate (LOPRESSOR) 100 MG tablet [METOPROLOL TARTRATE (LOPRESSOR) 100 MG TABLET] Take 1 tablet (100 mg total) by mouth 2 (two) times a day. 180 tablet 3     moxifloxacin (VIGAMOX) 0.5 % ophthalmic solution Apply 1 drop to eye       NOVOLIN R REGULAR U-100 INSULN 100 unit/mL injection [NOVOLIN R REGULAR U-100 INSULN 100 UNIT/ML INJECTION] Check blood sugar four (4) times daily.  10.65 Type 1 with hyperglycemia  No supplies needed  BD Ultra-fine 6 mm 31G 0.3 mL syringe - NDC [address]-73 - dispense 1 case, refill PRN for 1 year 10 mL PRN     ondansetron (ZOFRAN) 4 MG tablet [ONDANSETRON (ZOFRAN) 4 MG TABLET] TAKE 1 TABLET (4 MG TOTAL) BY MOUTH EVERY 8 (EIGHT) HOURS AS NEEDED FOR NAUSEA. 20 tablet 3     pantoprazole (PROTONIX) 40 MG tablet [PANTOPRAZOLE (PROTONIX) 40 MG TABLET] TAKE 1 TABLET(40 MG) BY MOUTH TWICE DAILY 180 tablet 3     pen needle, diabetic (BD ULTRA-FINE VICTORIANO PEN NEEDLE) 32 gauge x 5/32\" Ndle [PEN NEEDLE, DIABETIC (BD ULTRA-FINE " "VICTORIANO PEN NEEDLE) 32 GAUGE X 5/32\" NDLE] Use daily with insulin pen 100 each 6     polyethylene glycol (MIRALAX) 17 GM/Dose powder 17 g       pramipexole (MIRAPEX) 0.25 MG tablet [PRAMIPEXOLE (MIRAPEX) 0.25 MG TABLET] Take 2 tablets (0.5 mg total) by mouth at bedtime. 28 tablet 4     QUEtiapine (SEROQUEL) 300 MG tablet [QUETIAPINE (SEROQUEL) 300 MG TABLET] Take 600 mg by mouth bedtime.       rivaroxaban ANTICOAGULANT (XARELTO) 20 mg tablet [RIVAROXABAN ANTICOAGULANT (XARELTO) 20 MG TABLET] Take 20 mg by mouth daily.       sildenafil (VIAGRA) 100 MG tablet [SILDENAFIL (VIAGRA) 100 MG TABLET] Take 1 tablet (100 mg total) by mouth as needed for erectile dysfunction. 30 tablet 2     simvastatin (ZOCOR) 20 MG tablet [SIMVASTATIN (ZOCOR) 20 MG TABLET] TAKE 1 TABLET(20 MG) BY MOUTH AT BEDTIME 90 tablet 1     traZODone (DESYREL) 100 MG tablet [TRAZODONE (DESYREL) 100 MG TABLET] TAKE 2 TABLETS BY MOUTH AT BEDTIME FOR INSOMNIA       No facility-administered medications prior to visit.           Phone call duration: 20 minutes    Date of last OV: 3/23/21  Reason for Visit: DM        "

## 2021-12-16 DIAGNOSIS — I10 ESSENTIAL HYPERTENSION, BENIGN: ICD-10-CM

## 2021-12-19 NOTE — TELEPHONE ENCOUNTER
"Routing refill request to provider for review/approval because:  Patient needs to be seen because it has been more than 1 year since last office visit.  PCP information not populated in chart.    Last Written Prescription Date:  12/22/20  Last Fill Quantity: 180,  # refills: 3   Last office visit provider:  12/2/20     Requested Prescriptions   Pending Prescriptions Disp Refills     metoprolol tartrate (LOPRESSOR) 100 MG tablet [Pharmacy Med Name: METOPROLOL TARTRATE 100MG TABLETS] 180 tablet 3     Sig: TAKE 1 TABLET(100 MG) BY MOUTH TWICE DAILY       Beta-Blockers Protocol Failed - 12/16/2021 11:18 AM        Failed - Recent (12 mo) or future (30 days) visit within the authorizing provider's specialty     Patient has had an office visit with the authorizing provider or a provider within the authorizing providers department within the previous 12 mos or has a future within next 30 days. See \"Patient Info\" tab in inbasket, or \"Choose Columns\" in Meds & Orders section of the refill encounter.              Passed - Blood pressure under 140/90 in past 12 months     BP Readings from Last 3 Encounters:   07/20/21 138/80   06/09/21 130/80   12/02/20 136/68                 Passed - Patient is age 6 or older        Passed - Medication is active on med list             Torres Arriola RN 12/19/21 10:55 AM  "

## 2021-12-20 ENCOUNTER — TELEPHONE (OUTPATIENT)
Dept: ENDOCRINOLOGY | Facility: CLINIC | Age: 56
End: 2021-12-20
Payer: COMMERCIAL

## 2021-12-20 RX ORDER — METOPROLOL TARTRATE 100 MG
TABLET ORAL
Qty: 180 TABLET | Refills: 3 | Status: SHIPPED | OUTPATIENT
Start: 2021-12-20

## 2021-12-20 NOTE — TELEPHONE ENCOUNTER
[facility] Call Center    Phone Message    May a detailed message be left on voicemail: yes     Reason for Call: Medication Refill Request    Has the patient contacted the pharmacy for the refill? Yes   Name of medication being requested: metoprolol tartrate (LOPRESSOR) 100 MG tablet  Provider who prescribed the medication: Dr. Fernando Hannon  Pharmacy: [id number] [address] AT [place] [address]  Date medication is needed: ASAP, per pt he is wanting to know if provider would be willing/able to call in a refill for this Rx. Per chart this isn't normally filled by provider, and per pt they have been having issues getting it refilled and they are currently using their emergency supplies. Please call pt back to confirm if this request can be accommodated.          Action Taken: Message routed to:  Other: endocrine    Travel Screening: Not Applicable

## 2021-12-20 NOTE — TELEPHONE ENCOUNTER
Left message for pt to c/b to inform him, needs to contact primary's office. Pt needs to schedule a follow up with primary as he has not been seen in a year. Per there protocol.

## 2021-12-22 NOTE — TELEPHONE ENCOUNTER
Primary MD did refill. Tried to reach patient to inform him, he needs to make appointment with primary.

## 2021-12-29 DIAGNOSIS — E10.319 TYPE 1 DIABETES MELLITUS WITH RETINOPATHY OF BOTH EYES, MACULAR EDEMA PRESENCE UNSPECIFIED, UNSPECIFIED RETINOPATHY SEVERITY (H): ICD-10-CM

## 2021-12-29 RX ORDER — INSULIN GLARGINE 100 [IU]/ML
28 INJECTION, SOLUTION SUBCUTANEOUS AT BEDTIME
Qty: 30 ML | Refills: 1 | Status: SHIPPED | OUTPATIENT
Start: 2021-12-29

## 2022-01-13 ENCOUNTER — TELEPHONE (OUTPATIENT)
Dept: ENDOCRINOLOGY | Facility: CLINIC | Age: 57
End: 2022-01-13
Payer: COMMERCIAL

## 2022-01-13 NOTE — TELEPHONE ENCOUNTER
[facility] Call Center    Phone Message    May a detailed message be left on voicemail: yes     Reason for Call: Symptoms or Concerns     If patient has red-flag symptoms, warm transfer to triage line    Current symptom or concern: Per patient has had  diarrhea for last 3 days  Wakes them out of sleep been taking over the counter meds but  not helping    Symptoms have been present for:  3 day(s)    Has patient previously been seen for this? No    Are there any new or worsening symptoms? Yes: uncontrolled during sleep      Action Taken: Other: ENDO    Travel Screening: Not Applicable

## 2022-03-30 ENCOUNTER — MEDICAL CORRESPONDENCE (OUTPATIENT)
Dept: HEALTH INFORMATION MANAGEMENT | Facility: CLINIC | Age: 57
End: 2022-03-30
Payer: COMMERCIAL

## 2022-04-02 ENCOUNTER — HEALTH MAINTENANCE LETTER (OUTPATIENT)
Age: 57
End: 2022-04-02

## 2022-05-03 DIAGNOSIS — K92.2 GASTROINTESTINAL HEMORRHAGE, UNSPECIFIED GASTROINTESTINAL HEMORRHAGE TYPE: ICD-10-CM

## 2022-05-03 NOTE — TELEPHONE ENCOUNTER
Chief Complaint   Patient presents with     Refill Request     Pantoprazole 40 mg     Incoming fax from [employer].  Send to rx pool  Tania Dang RN on 5/3/2022 at 10:38 AM

## 2022-05-06 RX ORDER — PANTOPRAZOLE SODIUM 40 MG/1
TABLET, DELAYED RELEASE ORAL
Qty: 180 TABLET | Refills: 3 | Status: SHIPPED | OUTPATIENT
Start: 2022-05-06

## 2022-05-06 NOTE — TELEPHONE ENCOUNTER
"Routing refill request to provider for review/approval because:  Patient needs to be seen because it has been more than 1 year since last office visit.    Last Written Prescription Date:  6/17/2021  Last Fill Quantity: 180,  # refills: 3   Last office visit provider:  12/2/2020     Requested Prescriptions   Pending Prescriptions Disp Refills     pantoprazole (PROTONIX) 40 MG EC tablet 180 tablet 3       PPI Protocol Failed - 5/6/2022  1:57 PM        Failed - Recent (12 mo) or future (30 days) visit within the authorizing provider's specialty     Patient has had an office visit with the authorizing provider or a provider within the authorizing providers department within the previous 12 mos or has a future within next 30 days. See \"Patient Info\" tab in inbasket, or \"Choose Columns\" in Meds & Orders section of the refill encounter.              Passed - Not on Clopidogrel (unless Pantoprazole ordered)        Passed - No diagnosis of osteoporosis on record        Passed - Medication is active on med list        Passed - Patient is age 18 or older             Rhea Nicholas RN 05/06/22 2:00 PM  "

## 2022-05-31 ENCOUNTER — TELEPHONE (OUTPATIENT)
Dept: ENDOCRINOLOGY | Facility: CLINIC | Age: 57
End: 2022-05-31
Payer: COMMERCIAL

## 2022-05-31 NOTE — TELEPHONE ENCOUNTER
Patient missed recent lab appointment scheduled on  5/24/22.    Courtesy call made to schedule lab appointment at least 24 hours in advance of upcoming video visit scheduled in Endocrinology on 6/01/22.  Left message for patient to call our scheduling phone number at [phone] to set this up.  Future lab orders are in chart.    Advised if unable to reschedule lab appointment, will need to reschedule virtual visit to another time when labs can be completed prior to the visit.      The following MyChart sent to the patient:    Kwame Joseph,    You have an upcoming appointment scheduled with Lauren eKndall CNP on 6/01/22.   On review of your labs, it is noted that your labs are not current.  It appears you missed your recent scheduled lab appointment.  It is important for your provider to have current labs in order to provide the best care and recommendations for your treatment plan.  There are lab orders in your chart.  Please call and reschedule your lab appointment at least 24 hours in advance of your upcoming appointment in order to ensure the results are available for review and discussion during your appointment.      NOTE: If you are not able to complete the lab work prior to your appointment, your provider asks that you reschedule your appointment as well as complete your lab work preferably 1 week prior to your rescheduled appointment.  We appreciate your understanding.  Please call with any questions.    Sincerely,  Kat CUMMINGS, Medical Assistant for   Lauren Kendall NP Endocrinology  [phone]

## 2022-06-19 DIAGNOSIS — E10.9 TYPE 1 DIABETES MELLITUS (H): ICD-10-CM

## 2022-06-20 RX ORDER — PEN NEEDLE, DIABETIC 32GX 5/32"
NEEDLE, DISPOSABLE MISCELLANEOUS
OUTPATIENT
Start: 2022-06-20

## 2022-07-23 ENCOUNTER — HEALTH MAINTENANCE LETTER (OUTPATIENT)
Age: 57
End: 2022-07-23

## 2022-10-01 ENCOUNTER — HEALTH MAINTENANCE LETTER (OUTPATIENT)
Age: 57
End: 2022-10-01

## 2023-02-05 ENCOUNTER — HEALTH MAINTENANCE LETTER (OUTPATIENT)
Age: 58
End: 2023-02-05

## 2023-03-15 DIAGNOSIS — K92.2 GASTROINTESTINAL HEMORRHAGE, UNSPECIFIED GASTROINTESTINAL HEMORRHAGE TYPE: ICD-10-CM

## 2023-03-15 RX ORDER — PANTOPRAZOLE SODIUM 40 MG/1
TABLET, DELAYED RELEASE ORAL
Qty: 180 TABLET | Refills: 3 | OUTPATIENT
Start: 2023-03-15

## 2023-03-29 ENCOUNTER — TELEPHONE (OUTPATIENT)
Dept: ENDOCRINOLOGY | Facility: CLINIC | Age: 58
End: 2023-03-29
Payer: COMMERCIAL

## 2023-03-29 NOTE — TELEPHONE ENCOUNTER
Faxed the following form to [facility].  Patient is no longer following with our provider in endocrinology.

## 2023-08-06 ENCOUNTER — HEALTH MAINTENANCE LETTER (OUTPATIENT)
Age: 58
End: 2023-08-06

## 2024-03-03 ENCOUNTER — HEALTH MAINTENANCE LETTER (OUTPATIENT)
Age: 59
End: 2024-03-03

## 2024-06-11 NOTE — TELEPHONE ENCOUNTER
Spoke with patient.  Discussed we are not doing any in person visits at this time.  I can walk him through things over the phone and using videos.  He will try videos and see if this helps.   69

## 2024-09-29 ENCOUNTER — HEALTH MAINTENANCE LETTER (OUTPATIENT)
Age: 59
End: 2024-09-29